# Patient Record
Sex: FEMALE | Race: WHITE | Employment: OTHER | URBAN - METROPOLITAN AREA
[De-identification: names, ages, dates, MRNs, and addresses within clinical notes are randomized per-mention and may not be internally consistent; named-entity substitution may affect disease eponyms.]

---

## 2018-02-13 ENCOUNTER — OFFICE VISIT (OUTPATIENT)
Dept: DERMATOLOGY | Age: 63
End: 2018-02-13

## 2018-02-13 DIAGNOSIS — L30.8 OTHER ECZEMA: ICD-10-CM

## 2018-02-13 DIAGNOSIS — L57.8 DIFFUSE PHOTODAMAGE OF SKIN: ICD-10-CM

## 2018-02-13 DIAGNOSIS — B07.8 OTHER VIRAL WARTS: ICD-10-CM

## 2018-02-13 DIAGNOSIS — L57.0 KERATOSIS, ACTINIC: Primary | ICD-10-CM

## 2018-02-13 DIAGNOSIS — D22.9 BENIGN NEVUS: ICD-10-CM

## 2018-02-13 DIAGNOSIS — B00.9 HSV INFECTION: ICD-10-CM

## 2018-02-13 PROCEDURE — 99213 OFFICE O/P EST LOW 20 MIN: CPT | Performed by: DERMATOLOGY

## 2018-02-13 RX ORDER — DICLOFENAC SODIUM 75 MG/1
75 TABLET, DELAYED RELEASE ORAL
Status: ON HOLD | COMMUNITY
Start: 2017-12-20 | End: 2019-10-11 | Stop reason: HOSPADM

## 2018-02-13 RX ORDER — METOPROLOL TARTRATE 50 MG/1
TABLET, FILM COATED ORAL
COMMUNITY
Start: 2017-08-18

## 2018-02-13 RX ORDER — CYCLOBENZAPRINE HCL 10 MG
TABLET ORAL
COMMUNITY
Start: 2018-01-24

## 2018-04-07 ENCOUNTER — APPOINTMENT (OUTPATIENT)
Dept: GENERAL RADIOLOGY | Facility: HOSPITAL | Age: 63
End: 2018-04-07

## 2018-04-07 ENCOUNTER — APPOINTMENT (OUTPATIENT)
Dept: CT IMAGING | Facility: HOSPITAL | Age: 63
End: 2018-04-07

## 2018-04-07 ENCOUNTER — HOSPITAL ENCOUNTER (INPATIENT)
Facility: HOSPITAL | Age: 63
LOS: 10 days | Discharge: HOME | End: 2018-04-17
Attending: EMERGENCY MEDICINE | Admitting: INTERNAL MEDICINE

## 2018-04-07 DIAGNOSIS — E86.0 DEHYDRATION: ICD-10-CM

## 2018-04-07 DIAGNOSIS — R41.82 ALTERED MENTAL STATUS, UNSPECIFIED ALTERED MENTAL STATUS TYPE: ICD-10-CM

## 2018-04-07 DIAGNOSIS — E11.65 POORLY CONTROLLED TYPE 2 DIABETES MELLITUS (HCC): ICD-10-CM

## 2018-04-07 DIAGNOSIS — A41.9 SEPSIS, DUE TO UNSPECIFIED ORGANISM: ICD-10-CM

## 2018-04-07 DIAGNOSIS — R53.1 GENERALIZED WEAKNESS: Primary | ICD-10-CM

## 2018-04-07 DIAGNOSIS — R73.9 HYPERGLYCEMIA: ICD-10-CM

## 2018-04-07 LAB
ALBUMIN SERPL-MCNC: 4.3 G/DL (ref 3.5–5.2)
ALBUMIN/GLOB SERPL: 1.2 G/DL
ALP SERPL-CCNC: 127 U/L (ref 39–117)
ALT SERPL W P-5'-P-CCNC: 63 U/L (ref 1–33)
AMPHET+METHAMPHET UR QL: NEGATIVE
ANION GAP SERPL CALCULATED.3IONS-SCNC: 14.8 MMOL/L
AST SERPL-CCNC: 48 U/L (ref 1–32)
BARBITURATES UR QL SCN: NEGATIVE
BASOPHILS # BLD AUTO: 0.01 10*3/MM3 (ref 0–0.2)
BASOPHILS NFR BLD AUTO: 0.1 % (ref 0–1.5)
BENZODIAZ UR QL SCN: NEGATIVE
BILIRUB SERPL-MCNC: 0.8 MG/DL (ref 0.1–1.2)
BILIRUB UR QL STRIP: NEGATIVE
BUN BLD-MCNC: 30 MG/DL (ref 8–23)
BUN/CREAT SERPL: 23.1 (ref 7–25)
CALCIUM SPEC-SCNC: 9.7 MG/DL (ref 8.6–10.5)
CANNABINOIDS SERPL QL: NEGATIVE
CHLORIDE SERPL-SCNC: 91 MMOL/L (ref 98–107)
CK SERPL-CCNC: 62 U/L (ref 20–180)
CLARITY UR: CLEAR
CO2 SERPL-SCNC: 24.2 MMOL/L (ref 22–29)
COCAINE UR QL: NEGATIVE
COLOR UR: YELLOW
CREAT BLD-MCNC: 1.3 MG/DL (ref 0.57–1)
CRP SERPL-MCNC: 5.43 MG/DL (ref 0–0.5)
D-LACTATE SERPL-SCNC: 3.6 MMOL/L (ref 0.5–2)
DEPRECATED RDW RBC AUTO: 46.6 FL (ref 37–54)
EOSINOPHIL # BLD AUTO: 0.01 10*3/MM3 (ref 0–0.7)
EOSINOPHIL NFR BLD AUTO: 0.1 % (ref 0.3–6.2)
ERYTHROCYTE [DISTWIDTH] IN BLOOD BY AUTOMATED COUNT: 13.4 % (ref 11.7–13)
ETHANOL BLD-MCNC: <10 MG/DL (ref 0–10)
ETHANOL UR QL: <0.01 %
GFR SERPL CREATININE-BSD FRML MDRD: 41 ML/MIN/1.73
GFR SERPL CREATININE-BSD FRML MDRD: 50 ML/MIN/1.73
GLOBULIN UR ELPH-MCNC: 3.7 GM/DL
GLUCOSE BLD-MCNC: 437 MG/DL (ref 65–99)
GLUCOSE UR STRIP-MCNC: ABNORMAL MG/DL
HCT VFR BLD AUTO: 42.6 % (ref 35.6–45.5)
HGB BLD-MCNC: 14.5 G/DL (ref 11.9–15.5)
HGB UR QL STRIP.AUTO: NEGATIVE
HOLD SPECIMEN: NORMAL
HOLD SPECIMEN: NORMAL
IMM GRANULOCYTES # BLD: 0.02 10*3/MM3 (ref 0–0.03)
IMM GRANULOCYTES NFR BLD: 0.2 % (ref 0–0.5)
KETONES UR QL STRIP: ABNORMAL
LEUKOCYTE ESTERASE UR QL STRIP.AUTO: NEGATIVE
LYMPHOCYTES # BLD AUTO: 0.58 10*3/MM3 (ref 0.9–4.8)
LYMPHOCYTES NFR BLD AUTO: 6.9 % (ref 19.6–45.3)
MAGNESIUM SERPL-MCNC: 1.8 MG/DL (ref 1.6–2.4)
MCH RBC QN AUTO: 32.7 PG (ref 26.9–32)
MCHC RBC AUTO-ENTMCNC: 34 G/DL (ref 32.4–36.3)
MCV RBC AUTO: 96.2 FL (ref 80.5–98.2)
METHADONE UR QL SCN: NEGATIVE
MONOCYTES # BLD AUTO: 0.29 10*3/MM3 (ref 0.2–1.2)
MONOCYTES NFR BLD AUTO: 3.5 % (ref 5–12)
NEUTROPHILS # BLD AUTO: 7.47 10*3/MM3 (ref 1.9–8.1)
NEUTROPHILS NFR BLD AUTO: 89.2 % (ref 42.7–76)
NITRITE UR QL STRIP: NEGATIVE
OPIATES UR QL: NEGATIVE
OXYCODONE UR QL SCN: NEGATIVE
PH UR STRIP.AUTO: 6 [PH] (ref 5–8)
PLATELET # BLD AUTO: 104 10*3/MM3 (ref 140–500)
PMV BLD AUTO: 11.6 FL (ref 6–12)
POTASSIUM BLD-SCNC: 5.8 MMOL/L (ref 3.5–5.2)
PROCALCITONIN SERPL-MCNC: 1.08 NG/ML (ref 0.1–0.25)
PROT SERPL-MCNC: 8 G/DL (ref 6–8.5)
PROT UR QL STRIP: NEGATIVE
RBC # BLD AUTO: 4.43 10*6/MM3 (ref 3.9–5.2)
SODIUM BLD-SCNC: 130 MMOL/L (ref 136–145)
SP GR UR STRIP: 1.03 (ref 1–1.03)
TROPONIN T SERPL-MCNC: <0.01 NG/ML (ref 0–0.03)
UROBILINOGEN UR QL STRIP: ABNORMAL
WBC NRBC COR # BLD: 8.38 10*3/MM3 (ref 4.5–10.7)
WHOLE BLOOD HOLD SPECIMEN: NORMAL
WHOLE BLOOD HOLD SPECIMEN: NORMAL

## 2018-04-07 PROCEDURE — 25010000002 PIPERACILLIN SOD-TAZOBACTAM PER 1 G: Performed by: INTERNAL MEDICINE

## 2018-04-07 PROCEDURE — 71045 X-RAY EXAM CHEST 1 VIEW: CPT

## 2018-04-07 PROCEDURE — 80307 DRUG TEST PRSMV CHEM ANLYZR: CPT | Performed by: EMERGENCY MEDICINE

## 2018-04-07 PROCEDURE — 93010 ELECTROCARDIOGRAM REPORT: CPT | Performed by: INTERNAL MEDICINE

## 2018-04-07 PROCEDURE — 83605 ASSAY OF LACTIC ACID: CPT | Performed by: EMERGENCY MEDICINE

## 2018-04-07 PROCEDURE — 25010000002 VANCOMYCIN 10 G RECONSTITUTED SOLUTION: Performed by: EMERGENCY MEDICINE

## 2018-04-07 PROCEDURE — 87150 DNA/RNA AMPLIFIED PROBE: CPT | Performed by: EMERGENCY MEDICINE

## 2018-04-07 PROCEDURE — 87186 SC STD MICRODIL/AGAR DIL: CPT | Performed by: EMERGENCY MEDICINE

## 2018-04-07 PROCEDURE — 84145 PROCALCITONIN (PCT): CPT | Performed by: EMERGENCY MEDICINE

## 2018-04-07 PROCEDURE — 99285 EMERGENCY DEPT VISIT HI MDM: CPT

## 2018-04-07 PROCEDURE — 87040 BLOOD CULTURE FOR BACTERIA: CPT | Performed by: EMERGENCY MEDICINE

## 2018-04-07 PROCEDURE — 87486 CHLMYD PNEUM DNA AMP PROBE: CPT | Performed by: INTERNAL MEDICINE

## 2018-04-07 PROCEDURE — 93005 ELECTROCARDIOGRAM TRACING: CPT | Performed by: EMERGENCY MEDICINE

## 2018-04-07 PROCEDURE — 86140 C-REACTIVE PROTEIN: CPT | Performed by: INTERNAL MEDICINE

## 2018-04-07 PROCEDURE — 74176 CT ABD & PELVIS W/O CONTRAST: CPT

## 2018-04-07 PROCEDURE — 87633 RESP VIRUS 12-25 TARGETS: CPT | Performed by: INTERNAL MEDICINE

## 2018-04-07 PROCEDURE — 93005 ELECTROCARDIOGRAM TRACING: CPT

## 2018-04-07 PROCEDURE — 85025 COMPLETE CBC W/AUTO DIFF WBC: CPT | Performed by: EMERGENCY MEDICINE

## 2018-04-07 PROCEDURE — 82550 ASSAY OF CK (CPK): CPT | Performed by: EMERGENCY MEDICINE

## 2018-04-07 PROCEDURE — 0HQ0XZZ REPAIR SCALP SKIN, EXTERNAL APPROACH: ICD-10-PCS | Performed by: EMERGENCY MEDICINE

## 2018-04-07 PROCEDURE — 70450 CT HEAD/BRAIN W/O DYE: CPT

## 2018-04-07 PROCEDURE — 83735 ASSAY OF MAGNESIUM: CPT | Performed by: EMERGENCY MEDICINE

## 2018-04-07 PROCEDURE — 87798 DETECT AGENT NOS DNA AMP: CPT | Performed by: INTERNAL MEDICINE

## 2018-04-07 PROCEDURE — 81003 URINALYSIS AUTO W/O SCOPE: CPT | Performed by: EMERGENCY MEDICINE

## 2018-04-07 PROCEDURE — 84484 ASSAY OF TROPONIN QUANT: CPT | Performed by: EMERGENCY MEDICINE

## 2018-04-07 PROCEDURE — 80053 COMPREHEN METABOLIC PANEL: CPT | Performed by: EMERGENCY MEDICINE

## 2018-04-07 PROCEDURE — 87147 CULTURE TYPE IMMUNOLOGIC: CPT | Performed by: EMERGENCY MEDICINE

## 2018-04-07 PROCEDURE — 36415 COLL VENOUS BLD VENIPUNCTURE: CPT

## 2018-04-07 PROCEDURE — 72125 CT NECK SPINE W/O DYE: CPT

## 2018-04-07 PROCEDURE — 87581 M.PNEUMON DNA AMP PROBE: CPT | Performed by: INTERNAL MEDICINE

## 2018-04-07 RX ORDER — GABAPENTIN 600 MG/1
600 TABLET ORAL 3 TIMES DAILY
COMMUNITY

## 2018-04-07 RX ORDER — TRAZODONE HYDROCHLORIDE 50 MG/1
50 TABLET ORAL NIGHTLY
Status: ON HOLD | COMMUNITY
End: 2018-04-17

## 2018-04-07 RX ORDER — NICOTINE POLACRILEX 4 MG
15 LOZENGE BUCCAL
Status: DISCONTINUED | OUTPATIENT
Start: 2018-04-07 | End: 2018-04-17 | Stop reason: HOSPADM

## 2018-04-07 RX ORDER — DEXTROSE MONOHYDRATE 25 G/50ML
25 INJECTION, SOLUTION INTRAVENOUS
Status: DISCONTINUED | OUTPATIENT
Start: 2018-04-07 | End: 2018-04-17 | Stop reason: HOSPADM

## 2018-04-07 RX ORDER — SODIUM CHLORIDE 0.9 % (FLUSH) 0.9 %
10 SYRINGE (ML) INJECTION AS NEEDED
Status: DISCONTINUED | OUTPATIENT
Start: 2018-04-07 | End: 2018-04-17 | Stop reason: HOSPADM

## 2018-04-07 RX ORDER — CYCLOBENZAPRINE HCL 10 MG
10 TABLET ORAL EVERY 8 HOURS PRN
COMMUNITY

## 2018-04-07 RX ADMIN — VANCOMYCIN HYDROCHLORIDE 2000 MG: 10 INJECTION, POWDER, LYOPHILIZED, FOR SOLUTION INTRAVENOUS at 23:01

## 2018-04-07 RX ADMIN — TAZOBACTAM SODIUM AND PIPERACILLIN SODIUM 4.5 G: 500; 4 INJECTION, SOLUTION INTRAVENOUS at 23:31

## 2018-04-07 RX ADMIN — SODIUM CHLORIDE 1000 ML: 9 INJECTION, SOLUTION INTRAVENOUS at 21:58

## 2018-04-07 RX ADMIN — SODIUM CHLORIDE 1000 ML: 9 INJECTION, SOLUTION INTRAVENOUS at 19:44

## 2018-04-07 NOTE — ED TRIAGE NOTES
"Patient presents to ER via EMS.  Family reports that patient had intermittent left arm pain after lunch.  Patient became pale and \"felt bad\".  Patient attempted to get out of car and fell forward but broke fall with hands.  While daughter was assisting patient into house patient had syncope and struck head.  Patient tried to get up and was unable and then daughter called ems.  "

## 2018-04-07 NOTE — ED PROVIDER NOTES
" EMERGENCY DEPARTMENT ENCOUNTER    Room Number:  12/12  Date seen:  4/7/2018  Time seen: 7:10 PM  PCP: No Known Provider  Historian: Patient, Family  History Limited By: Patient is a vague historian          HPI:  Chief Complaint: Multiple Falls   Context: Elisabet Berry is a 62 y.o. female with h/o diabetes. Pt was last known well at her baseline neurological and mental status at about 12:00 PM today. Pt reports that at about 14:30 today, while pt was walking around looking at apartments for her daughter, pt developed \"shooting\" left arm pain. Pt was also concurrently pale due to which pt's daughter drove pt back home. While pt was walking on the driveway en route to her home, pt fell about 3 times, but did not sustain any significant injuries during each of these falls. While pt was inside her home, pt had a brief syncopal episode and sustained blow to the head against the coffee table (pt is a vague historian and does not clarify if pt had any prodrome before the syncopal episode). When pt attempted to get up off of the floor s/p the syncopal episode, pt fell again. Consequently, pt's daughter called the paramedics. Daughter reports that pt has also had generalized weakness and has been answering questions more slowly than pt's baseline (both of these symptoms began before pt sustained head injury). There are no other complaints at this time.        Location: Generalized body  Radiation: N/A  Quality: N/A  Intensity/Severity: Moderate  Duration: Onset at about 14:30 today  Onset quality: Abrupt  Timing: Intermittent   Progression: Unknown  Aggravating Factors: Ambulation  Alleviating Factors: Nothing  Previous Episodes: None  Treatment before arrival: None  Associated Symptoms: Left arm pain, syncope, head injury, generalized weakness, slow speech        MEDICAL RECORD REVIEW    Pt has had no recent visits to [facility] ED.             PAST MEDICAL HISTORY  Active Ambulatory Problems     Diagnosis Date Noted   • No " Active Ambulatory Problems     Resolved Ambulatory Problems     Diagnosis Date Noted   • No Resolved Ambulatory Problems     No Additional Past Medical History         PAST SURGICAL HISTORY  No past surgical history on file.      FAMILY HISTORY  No family history on file.      SOCIAL HISTORY  Social History     Social History   • Marital status:      Spouse name: N/A   • Number of children: N/A   • Years of education: N/A     Occupational History   • Not on file.     Social History Main Topics   • Smoking status: Not on file   • Smokeless tobacco: Not on file   • Alcohol use Not on file   • Drug use: Unknown   • Sexual activity: Not on file     Other Topics Concern   • Not on file     Social History Narrative   • No narrative on file         ALLERGIES  Review of patient's allergies indicates no known allergies.        REVIEW OF SYSTEMS  Review of Systems   Unable to perform ROS: Other (pt is a vague historian)   Musculoskeletal:        Left arm pain, head injury   Skin: Positive for pallor.   Neurological: Positive for syncope, speech difficulty (slow speech) and weakness (generalized; not focal).            PHYSICAL EXAM  ED Triage Vitals   Temp Heart Rate Resp BP SpO2   04/07/18 1853 04/07/18 1845 04/07/18 1845 04/07/18 1845 04/07/18 1845   97 °F (36.1 °C) (!) 123 22 142/64 96 %      Temp src Heart Rate Source Patient Position BP Location FiO2 (%)   -- 04/07/18 1845 04/07/18 1845 -- --    Monitor Sitting         Physical Exam   Constitutional: She is oriented to person, place, and time. She appears distressed (mild).   HENT:   Mouth/Throat: Mucous membranes are normal.   Eyes: EOM are normal. Pupils are equal, round, and reactive to light.   Neck: Neck supple.   Cardiovascular: Regular rhythm and normal heart sounds.  Tachycardia present.    Pulmonary/Chest: Effort normal and breath sounds normal. No respiratory distress. She has no decreased breath sounds. She has no wheezes. She has no rhonchi. She has no  rales.   Abdominal: Soft. There is no tenderness. There is no rebound and no guarding.   Musculoskeletal:   No C-Spine, T-Spine, or L-Spine tenderness.   Neurological: She is alert and oriented to person, place, and time. She has normal sensation. She displays weakness (pt exhibits generalized weakness).   Pt answers questions slowly, but appropriately. Pt is able to lift her BLEs off of the bed, but will drop them immediately afterwards.    Skin: Skin is warm. Laceration (<1 cm laceration to the occiput) noted.   Psychiatric: Mood and affect normal.   Nursing note and vitals reviewed.          LAB RESULTS  Recent Results (from the past 24 hour(s))   Comprehensive Metabolic Panel    Collection Time: 04/07/18  6:58 PM   Result Value Ref Range    Glucose 437 (C) 65 - 99 mg/dL    BUN 30 (H) 8 - 23 mg/dL    Creatinine 1.30 (H) 0.57 - 1.00 mg/dL    Sodium 130 (L) 136 - 145 mmol/L    Potassium 5.8 (H) 3.5 - 5.2 mmol/L    Chloride 91 (L) 98 - 107 mmol/L    CO2 24.2 22.0 - 29.0 mmol/L    Calcium 9.7 8.6 - 10.5 mg/dL    Total Protein 8.0 6.0 - 8.5 g/dL    Albumin 4.30 3.50 - 5.20 g/dL    ALT (SGPT) 63 (H) 1 - 33 U/L    AST (SGOT) 48 (H) 1 - 32 U/L    Alkaline Phosphatase 127 (H) 39 - 117 U/L    Total Bilirubin 0.8 0.1 - 1.2 mg/dL    eGFR Non African Amer 41 (L) >60 mL/min/1.73    eGFR  African Amer 50 (L) >60 mL/min/1.73    Globulin 3.7 gm/dL    A/G Ratio 1.2 g/dL    BUN/Creatinine Ratio 23.1 7.0 - 25.0    Anion Gap 14.8 mmol/L   Magnesium    Collection Time: 04/07/18  6:58 PM   Result Value Ref Range    Magnesium 1.8 1.6 - 2.4 mg/dL   Troponin    Collection Time: 04/07/18  6:58 PM   Result Value Ref Range    Troponin T <0.010 0.000 - 0.030 ng/mL   Light Blue Top    Collection Time: 04/07/18  6:58 PM   Result Value Ref Range    Extra Tube hold for add-on    Green Top (Gel)    Collection Time: 04/07/18  6:58 PM   Result Value Ref Range    Extra Tube Hold for add-ons.    Lavender Top    Collection Time: 04/07/18  6:58 PM    Result Value Ref Range    Extra Tube hold for add-on    Gold Top - SST    Collection Time: 04/07/18  6:58 PM   Result Value Ref Range    Extra Tube Hold for add-ons.    CBC Auto Differential    Collection Time: 04/07/18  6:58 PM   Result Value Ref Range    WBC 8.38 4.50 - 10.70 10*3/mm3    RBC 4.43 3.90 - 5.20 10*6/mm3    Hemoglobin 14.5 11.9 - 15.5 g/dL    Hematocrit 42.6 35.6 - 45.5 %    MCV 96.2 80.5 - 98.2 fL    MCH 32.7 (H) 26.9 - 32.0 pg    MCHC 34.0 32.4 - 36.3 g/dL    RDW 13.4 (H) 11.7 - 13.0 %    RDW-SD 46.6 37.0 - 54.0 fl    MPV 11.6 6.0 - 12.0 fL    Platelets 104 (L) 140 - 500 10*3/mm3    Neutrophil % 89.2 (H) 42.7 - 76.0 %    Lymphocyte % 6.9 (L) 19.6 - 45.3 %    Monocyte % 3.5 (L) 5.0 - 12.0 %    Eosinophil % 0.1 (L) 0.3 - 6.2 %    Basophil % 0.1 0.0 - 1.5 %    Immature Grans % 0.2 0.0 - 0.5 %    Neutrophils, Absolute 7.47 1.90 - 8.10 10*3/mm3    Lymphocytes, Absolute 0.58 (L) 0.90 - 4.80 10*3/mm3    Monocytes, Absolute 0.29 0.20 - 1.20 10*3/mm3    Eosinophils, Absolute 0.01 0.00 - 0.70 10*3/mm3    Basophils, Absolute 0.01 0.00 - 0.20 10*3/mm3    Immature Grans, Absolute 0.02 0.00 - 0.03 10*3/mm3   CK    Collection Time: 04/07/18  6:58 PM   Result Value Ref Range    Creatine Kinase 62 20 - 180 U/L   Ethanol    Collection Time: 04/07/18  6:58 PM   Result Value Ref Range    Ethanol <10 0 - 10 mg/dL    Ethanol % <0.010 %   Procalcitonin    Collection Time: 04/07/18  6:58 PM   Result Value Ref Range    Procalcitonin 1.08 (C) 0.10 - 0.25 ng/mL   C-reactive Protein    Collection Time: 04/07/18  6:58 PM   Result Value Ref Range    C-Reactive Protein 5.43 (H) 0.00 - 0.50 mg/dL   Urinalysis With / Microscopic If Indicated - Urine, Clean Catch    Collection Time: 04/07/18  7:42 PM   Result Value Ref Range    Color, UA Yellow Yellow, Straw    Appearance, UA Clear Clear    pH, UA 6.0 5.0 - 8.0    Specific Gravity, UA 1.028 1.005 - 1.030    Glucose, UA >=1000 mg/dL (3+) (A) Negative    Ketones, UA 15 mg/dL (1+)  (A) Negative    Bilirubin, UA Negative Negative    Blood, UA Negative Negative    Protein, UA Negative Negative    Leuk Esterase, UA Negative Negative    Nitrite, UA Negative Negative    Urobilinogen, UA 0.2 E.U./dL 0.2 - 1.0 E.U./dL   Urine Drug Screen - Urine, Clean Catch    Collection Time: 04/07/18  7:42 PM   Result Value Ref Range    Amphet/Methamphet, Screen Negative Negative    Barbiturates Screen, Urine Negative Negative    Benzodiazepine Screen, Urine Negative Negative    Cocaine Screen, Urine Negative Negative    Opiate Screen Negative Negative    THC, Screen, Urine Negative Negative    Methadone Screen, Urine Negative Negative    Oxycodone Screen, Urine Negative Negative       Ordered the above labs and reviewed the results.        RADIOLOGY  CT Abdomen Pelvis Without Contrast   Final Result   1.  Colonic diverticulosis, constipation.   2. Nondisplaced right transverse process fractures of L1 and L2.   3. Grossly unremarkable noncontrast exam otherwise considering artifact.       This report was finalized on 4/7/2018 8:25 PM by Gian Vance MD.          CT Cervical Spine Without Contrast   Final Result   1. Postsurgical and degenerative changes as discussed, no acute osseous   finding       This report was finalized on 4/7/2018 8:20 PM by Gian Vance MD.          CT Head Without Contrast   Final Result   1. No acute intracranial abnormality.                           This study was performed with techniques to keep radiation doses as low   as reasonably achievable (ALARA). Individualized dose reduction   techniques using automated exposure control or adjustment of mA and/or   kV according to the patient size were employed.        This report was finalized on 4/7/2018 8:17 PM by Gian Vance MD.          XR Chest 1 View   Final Result       Expiratory radiograph without gross active air space disease process       This report was finalized on 4/7/2018 8:04 PM by Gian Vance MD.                  Ordered the above noted radiological studies. Reviewed by me in PACS.             PROCEDURES  Critical Care  Performed by: ANIKA DAVILA  Authorized by: ANIKA DAVILA     Critical care provider statement:     Critical care time (minutes):  40    Critical care time was exclusive of:  Separately billable procedures and treating other patients    Critical care was necessary to treat or prevent imminent or life-threatening deterioration of the following conditions:  Sepsis    Critical care was time spent personally by me on the following activities:  Development of treatment plan with patient or surrogate, discussions with consultants, evaluation of patient's response to treatment, examination of patient, obtaining history from patient or surrogate, ordering and review of laboratory studies, ordering and review of radiographic studies, pulse oximetry and re-evaluation of patient's condition              EKG:           EKG time: 19:02  Rhythm/Rate: Sinus tachycardia rate 125  P waves and [place]: Normal P waves  QRS, axis: Normal QRS   ST and T waves: Diffuse T wave flattening     Interpreted Contemporaneously by me, independently viewed  No old EKG is available for comparison           PROGRESS AND CONSULTS  ED Course   Comment By Time   9:19 PM  Patient here for generalized weakness.  This is a very bizarre case.  She has generalized weakness.  She has the strength to lift all extremities and then she drops them to the bed.  She answers questions slowly but appropriately.  Doubt stroke as it is bilateral.  Outside of tpa window and Ct negative.  Will need MRI.  Is dehydrated with elevated BUN and heart rate.  Has elevated sugars.  As well.  Has negative Ct of c spine and CT of abdomen.  Not short of breath.  Discussed with Dr. Tyson and Dr. FRANKLIN Mcknight and they are comfortable with floor admission.  Can transfer if deteriorates. Anika Davila MD 04/07 2119   10:04 PM  Patients heart rate still up.  Blood  pressure dropping.  PCT elevated.  Cultures and antibiotics.  Will send to unit for mental status. Antolin Montero MD 04/07 2204     7:33 PM:  Pt is not a candidate for tPA as pt's symptoms do not appear c/w stroke as pt's BUEs and BLEs are weak. UA, CK, UDS, BAL, blood work, CT Head, CT C-Spine, and CXR ordered for further evaluation. IV fluids ordered to hydrate pt.     7:41 PM:  CT Abd ordered for further evaluation.     8:31 PM:  Rechecked pt. Daughter states that pt currently appears slightly more confused. Informed pt and family that pt's UA is negative for acute UTI. Pt's CT Head is negative acute. CT C-Spine shows degenerative and postsurgical changes, but nothing acute otherwise. CT Abd shows that pt is constipated, but there is no acute intraabdominal abnormality. However, pt's platelet count is 104. K+ is 5.8. Creatinine is 1.30. Blood glucose is 437. Pt's CMP suggests that pt is dehydrated. Need for pt's admission to the hospital for further evaluation and treatment. Pt and family agree with plan. Decision time to admit: Now.     8:37 PM:  Placed call to the intensivist to discuss further course of care.     8:56 PM:  Discussed the case with Dr. Christian Mcknight, intensivist. He would like me to consult the internist; if the internist is uncomfortable with admitting pt on the floor, Dr. Mcknight will admit pt to an ICU bed.    9:01 PM:  Blood cultures ordered for further evaluation.     9:16 PM:  Discussed the case with Dr. Tyson, hospitalist. He will admit pt to a telemetry bed.     9:51 PM:  Rechecked pt. Pt's BP is 106/80. HR is 131. Pt's procalcitonin is 1.08. Will discuss this with the intensivist.    Pt's occipital laceration will be repaired by Ms. Erin Chairez PA-C.     9:55 PM:  Discussed case with Dr. Christian Mcknight, intensivist. He will admit pt to an ICU bed.     9:57 PM:  Lactic acid ordered for further evaluation. Zosyn and Vancomycin ordered to cover for infection.     10:22 PM:  Dr. Mcknight is  at pt's bedside evaluating pt. Dr. Tyson has been informed that pt has been admitted to the ICU.             MEDICAL DECISION MAKING      MDM  Number of Diagnoses or Management Options  Altered mental status, unspecified altered mental status type:   Dehydration:   Generalized weakness:   Hyperglycemia:      Amount and/or Complexity of Data Reviewed  Clinical lab tests: ordered and reviewed (Creatinine is 1.30. K+ is 5.8. BG is 437. Troponin is negative. Platelet count is 104. Procalcitonin is 1.08.)  Tests in the radiology section of CPT®: ordered and reviewed (CT Head:  No acute intracranial abnormality.    )  Tests in the medicine section of CPT®: reviewed and ordered (EKG interpreted.   )  Obtain history from someone other than the patient: yes (Daughter provides significant history. )  Discuss the patient with other providers: yes (Discussed the case with Dr. Christian Mcknight, intensivist. Case d/w Dr. Tyson, hospitalist.)    Patient Progress  Patient progress: other (comment) (Pt is guarded.)             DIAGNOSIS  Final diagnoses:   Generalized weakness   Hyperglycemia   Dehydration   Altered mental status, unspecified altered mental status type         DISPOSITION  Pt admitted to the ICU.      ADMISSION    Discussed treatment plan and reason for admission with pt/family and admitting physician.  Pt/family voiced understanding of the plan for admission for further testing/treatment as needed.                 Latest Documented Vital Signs:  As of 10:52 PM  BP- 108/64 HR- (!) 129 Temp- (!) 102.1 °F (38.9 °C) (Oral) O2 sat- 97%      --  Documentation assistance provided by kimberly Lang for Dr. Winston MD.  Information recorded by the kimberly was done at my direction and has been verified and validated by me.                   Rahul Lang  04/07/18 1371       Antolin Montero MD  04/07/18 [id number]

## 2018-04-08 ENCOUNTER — APPOINTMENT (OUTPATIENT)
Dept: GENERAL RADIOLOGY | Facility: HOSPITAL | Age: 63
End: 2018-04-08

## 2018-04-08 ENCOUNTER — APPOINTMENT (OUTPATIENT)
Dept: MRI IMAGING | Facility: HOSPITAL | Age: 63
End: 2018-04-08

## 2018-04-08 ENCOUNTER — APPOINTMENT (OUTPATIENT)
Dept: CARDIOLOGY | Facility: HOSPITAL | Age: 63
End: 2018-04-08
Attending: PSYCHIATRY & NEUROLOGY

## 2018-04-08 LAB
ALBUMIN SERPL-MCNC: 2.8 G/DL (ref 3.5–5.2)
ALBUMIN/GLOB SERPL: 1 G/DL
ALP SERPL-CCNC: 74 U/L (ref 39–117)
ALT SERPL W P-5'-P-CCNC: 39 U/L (ref 1–33)
ANION GAP SERPL CALCULATED.3IONS-SCNC: 16.9 MMOL/L
ARTERIAL PATENCY WRIST A: POSITIVE
ARTERIAL PATENCY WRIST A: POSITIVE
AST SERPL-CCNC: 30 U/L (ref 1–32)
ATMOSPHERIC PRESS: 749.7 MMHG
ATMOSPHERIC PRESS: 751.9 MMHG
B PERT DNA SPEC QL NAA+PROBE: NOT DETECTED
BACTERIA BLD CULT: ABNORMAL
BASE EXCESS BLDA CALC-SCNC: -3.8 MMOL/L (ref 0–2)
BASE EXCESS BLDA CALC-SCNC: -5.2 MMOL/L (ref 0–2)
BASOPHILS # BLD AUTO: 0.01 10*3/MM3 (ref 0–0.2)
BASOPHILS NFR BLD AUTO: 0.1 % (ref 0–1.5)
BDY SITE: ABNORMAL
BDY SITE: ABNORMAL
BILIRUB SERPL-MCNC: 0.8 MG/DL (ref 0.1–1.2)
BUN BLD-MCNC: 22 MG/DL (ref 8–23)
BUN/CREAT SERPL: 23.9 (ref 7–25)
C PNEUM DNA NPH QL NAA+NON-PROBE: NOT DETECTED
CALCIUM SPEC-SCNC: 6.9 MG/DL (ref 8.6–10.5)
CHLORIDE SERPL-SCNC: 105 MMOL/L (ref 98–107)
CO2 SERPL-SCNC: 17.1 MMOL/L (ref 22–29)
CREAT BLD-MCNC: 0.92 MG/DL (ref 0.57–1)
D-LACTATE SERPL-SCNC: 1.5 MMOL/L (ref 0.5–2)
D-LACTATE SERPL-SCNC: 2.4 MMOL/L (ref 0.5–2)
DEPRECATED RDW RBC AUTO: 48.7 FL (ref 37–54)
EOSINOPHIL # BLD AUTO: 0 10*3/MM3 (ref 0–0.7)
EOSINOPHIL NFR BLD AUTO: 0 % (ref 0.3–6.2)
ERYTHROCYTE [DISTWIDTH] IN BLOOD BY AUTOMATED COUNT: 13.4 % (ref 11.7–13)
FLUAV H1 2009 PAND RNA NPH QL NAA+PROBE: NOT DETECTED
FLUAV H1 HA GENE NPH QL NAA+PROBE: NOT DETECTED
FLUAV H3 RNA NPH QL NAA+PROBE: NOT DETECTED
FLUAV SUBTYP SPEC NAA+PROBE: NOT DETECTED
FLUBV RNA ISLT QL NAA+PROBE: NOT DETECTED
GFR SERPL CREATININE-BSD FRML MDRD: 62 ML/MIN/1.73
GLOBULIN UR ELPH-MCNC: 2.7 GM/DL
GLUCOSE BLD-MCNC: 316 MG/DL (ref 65–99)
GLUCOSE BLDC GLUCOMTR-MCNC: 209 MG/DL (ref 70–130)
GLUCOSE BLDC GLUCOMTR-MCNC: 235 MG/DL (ref 70–130)
GLUCOSE BLDC GLUCOMTR-MCNC: 255 MG/DL (ref 70–130)
GLUCOSE BLDC GLUCOMTR-MCNC: 256 MG/DL (ref 70–130)
GLUCOSE BLDC GLUCOMTR-MCNC: 298 MG/DL (ref 70–130)
GLUCOSE BLDC GLUCOMTR-MCNC: 394 MG/DL (ref 70–130)
HADV DNA SPEC NAA+PROBE: NOT DETECTED
HBA1C MFR BLD: 10.8 % (ref 4.8–5.6)
HCO3 BLDA-SCNC: 16.6 MMOL/L (ref 22–28)
HCO3 BLDA-SCNC: 18.6 MMOL/L (ref 22–28)
HCOV 229E RNA SPEC QL NAA+PROBE: NOT DETECTED
HCOV HKU1 RNA SPEC QL NAA+PROBE: NOT DETECTED
HCOV NL63 RNA SPEC QL NAA+PROBE: NOT DETECTED
HCOV OC43 RNA SPEC QL NAA+PROBE: NOT DETECTED
HCT VFR BLD AUTO: 34.9 % (ref 35.6–45.5)
HGB BLD-MCNC: 11.2 G/DL (ref 11.9–15.5)
HMPV RNA NPH QL NAA+NON-PROBE: NOT DETECTED
HOLD SPECIMEN: NORMAL
HOROWITZ INDEX BLD+IHG-RTO: 21 %
HOROWITZ INDEX BLD+IHG-RTO: 21 %
HPIV1 RNA SPEC QL NAA+PROBE: NOT DETECTED
HPIV2 RNA SPEC QL NAA+PROBE: NOT DETECTED
HPIV3 RNA NPH QL NAA+PROBE: NOT DETECTED
HPIV4 P GENE NPH QL NAA+PROBE: NOT DETECTED
IMM GRANULOCYTES # BLD: 0.03 10*3/MM3 (ref 0–0.03)
IMM GRANULOCYTES NFR BLD: 0.3 % (ref 0–0.5)
LYMPHOCYTES # BLD AUTO: 0.67 10*3/MM3 (ref 0.9–4.8)
LYMPHOCYTES NFR BLD AUTO: 7.1 % (ref 19.6–45.3)
M PNEUMO IGG SER IA-ACNC: NOT DETECTED
MCH RBC QN AUTO: 31.9 PG (ref 26.9–32)
MCHC RBC AUTO-ENTMCNC: 32.1 G/DL (ref 32.4–36.3)
MCV RBC AUTO: 99.4 FL (ref 80.5–98.2)
MODALITY: ABNORMAL
MODALITY: ABNORMAL
MONOCYTES # BLD AUTO: 0.38 10*3/MM3 (ref 0.2–1.2)
MONOCYTES NFR BLD AUTO: 4 % (ref 5–12)
NEUTROPHILS # BLD AUTO: 8.37 10*3/MM3 (ref 1.9–8.1)
NEUTROPHILS NFR BLD AUTO: 88.5 % (ref 42.7–76)
O2 A-A PPRESDIFF RESPIRATORY: 0.6 MMHG
O2 A-A PPRESDIFF RESPIRATORY: 0.6 MMHG
PCO2 BLDA: 22.9 MM HG (ref 35–45)
PCO2 BLDA: 26.6 MM HG (ref 35–45)
PH BLDA: 7.45 PH UNITS (ref 7.35–7.45)
PH BLDA: 7.47 PH UNITS (ref 7.35–7.45)
PLATELET # BLD AUTO: 86 10*3/MM3 (ref 140–500)
PMV BLD AUTO: 11 FL (ref 6–12)
PO2 BLDA: 71.2 MM HG (ref 80–100)
PO2 BLDA: 75.1 MM HG (ref 80–100)
POTASSIUM BLD-SCNC: 3.6 MMOL/L (ref 3.5–5.2)
PROT SERPL-MCNC: 5.5 G/DL (ref 6–8.5)
RBC # BLD AUTO: 3.51 10*6/MM3 (ref 3.9–5.2)
RHINOVIRUS RNA SPEC NAA+PROBE: NOT DETECTED
RSV RNA NPH QL NAA+NON-PROBE: NOT DETECTED
SAO2 % BLDCOA: 95.5 % (ref 92–99)
SAO2 % BLDCOA: 95.9 % (ref 92–99)
SET MECH RESP RATE: 25
SODIUM BLD-SCNC: 139 MMOL/L (ref 136–145)
TOTAL RATE: 24 BREATHS/MINUTE
TROPONIN T SERPL-MCNC: <0.01 NG/ML (ref 0–0.03)
URATE SERPL-MCNC: 3.1 MG/DL (ref 2.4–5.7)
WBC NRBC COR # BLD: 9.46 10*3/MM3 (ref 4.5–10.7)

## 2018-04-08 PROCEDURE — 82803 BLOOD GASES ANY COMBINATION: CPT

## 2018-04-08 PROCEDURE — 93306 TTE W/DOPPLER COMPLETE: CPT | Performed by: INTERNAL MEDICINE

## 2018-04-08 PROCEDURE — 99254 IP/OBS CNSLTJ NEW/EST MOD 60: CPT | Performed by: INTERNAL MEDICINE

## 2018-04-08 PROCEDURE — 0 GADOBENATE DIMEGLUMINE 529 MG/ML SOLUTION: Performed by: INTERNAL MEDICINE

## 2018-04-08 PROCEDURE — 86618 LYME DISEASE ANTIBODY: CPT | Performed by: INTERNAL MEDICINE

## 2018-04-08 PROCEDURE — 86666 EHRLICHIA ANTIBODY: CPT | Performed by: INTERNAL MEDICINE

## 2018-04-08 PROCEDURE — 73720 MRI LWR EXTREMITY W/O&W/DYE: CPT

## 2018-04-08 PROCEDURE — A9577 INJ MULTIHANCE: HCPCS | Performed by: INTERNAL MEDICINE

## 2018-04-08 PROCEDURE — 63710000001 INSULIN REGULAR HUMAN PER 5 UNITS: Performed by: INTERNAL MEDICINE

## 2018-04-08 PROCEDURE — 36600 WITHDRAWAL OF ARTERIAL BLOOD: CPT

## 2018-04-08 PROCEDURE — 70551 MRI BRAIN STEM W/O DYE: CPT

## 2018-04-08 PROCEDURE — 25010000002 NALOXONE PER 1 MG

## 2018-04-08 PROCEDURE — 80053 COMPREHEN METABOLIC PANEL: CPT | Performed by: INTERNAL MEDICINE

## 2018-04-08 PROCEDURE — 25010000002 VANCOMYCIN: Performed by: INTERNAL MEDICINE

## 2018-04-08 PROCEDURE — 36415 COLL VENOUS BLD VENIPUNCTURE: CPT | Performed by: INTERNAL MEDICINE

## 2018-04-08 PROCEDURE — 25010000002 PIPERACILLIN SOD-TAZOBACTAM PER 1 G: Performed by: INTERNAL MEDICINE

## 2018-04-08 PROCEDURE — 73030 X-RAY EXAM OF SHOULDER: CPT

## 2018-04-08 PROCEDURE — 99254 IP/OBS CNSLTJ NEW/EST MOD 60: CPT | Performed by: PSYCHIATRY & NEUROLOGY

## 2018-04-08 PROCEDURE — 25010000002 VANCOMYCIN 10 G RECONSTITUTED SOLUTION: Performed by: INTERNAL MEDICINE

## 2018-04-08 PROCEDURE — 83605 ASSAY OF LACTIC ACID: CPT | Performed by: INTERNAL MEDICINE

## 2018-04-08 PROCEDURE — 84550 ASSAY OF BLOOD/URIC ACID: CPT | Performed by: INTERNAL MEDICINE

## 2018-04-08 PROCEDURE — 82962 GLUCOSE BLOOD TEST: CPT

## 2018-04-08 PROCEDURE — 86757 RICKETTSIA ANTIBODY: CPT | Performed by: INTERNAL MEDICINE

## 2018-04-08 PROCEDURE — 93306 TTE W/DOPPLER COMPLETE: CPT

## 2018-04-08 PROCEDURE — 84484 ASSAY OF TROPONIN QUANT: CPT | Performed by: INTERNAL MEDICINE

## 2018-04-08 PROCEDURE — 86753 PROTOZOA ANTIBODY NOS: CPT | Performed by: INTERNAL MEDICINE

## 2018-04-08 PROCEDURE — 83036 HEMOGLOBIN GLYCOSYLATED A1C: CPT | Performed by: INTERNAL MEDICINE

## 2018-04-08 PROCEDURE — 85025 COMPLETE CBC W/AUTO DIFF WBC: CPT | Performed by: INTERNAL MEDICINE

## 2018-04-08 PROCEDURE — 25010000002 MORPHINE PER 10 MG: Performed by: INTERNAL MEDICINE

## 2018-04-08 RX ORDER — SODIUM CHLORIDE, SODIUM LACTATE, POTASSIUM CHLORIDE, CALCIUM CHLORIDE 600; 310; 30; 20 MG/100ML; MG/100ML; MG/100ML; MG/100ML
100 INJECTION, SOLUTION INTRAVENOUS CONTINUOUS
Status: ACTIVE | OUTPATIENT
Start: 2018-04-08 | End: 2018-04-09

## 2018-04-08 RX ORDER — SODIUM CHLORIDE 0.9 % (FLUSH) 0.9 %
1-10 SYRINGE (ML) INJECTION AS NEEDED
Status: DISCONTINUED | OUTPATIENT
Start: 2018-04-08 | End: 2018-04-17 | Stop reason: HOSPADM

## 2018-04-08 RX ORDER — NALOXONE HCL 0.4 MG/ML
VIAL (ML) INJECTION
Status: COMPLETED
Start: 2018-04-08 | End: 2018-04-08

## 2018-04-08 RX ORDER — MORPHINE SULFATE 2 MG/ML
2 INJECTION, SOLUTION INTRAMUSCULAR; INTRAVENOUS
Status: DISCONTINUED | OUTPATIENT
Start: 2018-04-08 | End: 2018-04-17 | Stop reason: HOSPADM

## 2018-04-08 RX ORDER — ACETAMINOPHEN 650 MG/1
650 SUPPOSITORY RECTAL EVERY 4 HOURS PRN
Status: DISCONTINUED | OUTPATIENT
Start: 2018-04-08 | End: 2018-04-17 | Stop reason: HOSPADM

## 2018-04-08 RX ORDER — SODIUM CHLORIDE 9 MG/ML
200 INJECTION, SOLUTION INTRAVENOUS CONTINUOUS
Status: ACTIVE | OUTPATIENT
Start: 2018-04-08 | End: 2018-04-08

## 2018-04-08 RX ADMIN — HUMAN INSULIN 8 UNITS: 100 INJECTION, SOLUTION SUBCUTANEOUS at 06:31

## 2018-04-08 RX ADMIN — TAZOBACTAM SODIUM AND PIPERACILLIN SODIUM 3.38 G: 375; 3 INJECTION, SOLUTION INTRAVENOUS at 21:24

## 2018-04-08 RX ADMIN — MORPHINE SULFATE 2 MG: 2 INJECTION, SOLUTION INTRAMUSCULAR; INTRAVENOUS at 10:04

## 2018-04-08 RX ADMIN — ACETAMINOPHEN 650 MG: 650 SUPPOSITORY RECTAL at 06:26

## 2018-04-08 RX ADMIN — SODIUM CHLORIDE, POTASSIUM CHLORIDE, SODIUM LACTATE AND CALCIUM CHLORIDE 100 ML/HR: 600; 310; 30; 20 INJECTION, SOLUTION INTRAVENOUS at 09:35

## 2018-04-08 RX ADMIN — SODIUM CHLORIDE 1000 ML: 9 INJECTION, SOLUTION INTRAVENOUS at 01:38

## 2018-04-08 RX ADMIN — NALOXONE HYDROCHLORIDE 0.8 MG: 0.4 INJECTION, SOLUTION INTRAMUSCULAR; INTRAVENOUS; SUBCUTANEOUS at 16:04

## 2018-04-08 RX ADMIN — NALOXONE HYDROCHLORIDE 0.8 MG: 0.4 INJECTION, SOLUTION INTRAMUSCULAR; INTRAVENOUS; SUBCUTANEOUS at 16:22

## 2018-04-08 RX ADMIN — HUMAN INSULIN 12 UNITS: 100 INJECTION, SOLUTION SUBCUTANEOUS at 00:43

## 2018-04-08 RX ADMIN — VANCOMYCIN HYDROCHLORIDE 1250 MG: 1 INJECTION, POWDER, LYOPHILIZED, FOR SOLUTION INTRAVENOUS at 22:54

## 2018-04-08 RX ADMIN — SODIUM CHLORIDE 1000 ML: 9 INJECTION, SOLUTION INTRAVENOUS at 17:01

## 2018-04-08 RX ADMIN — HUMAN INSULIN 8 UNITS: 100 INJECTION, SOLUTION SUBCUTANEOUS at 17:07

## 2018-04-08 RX ADMIN — VANCOMYCIN HYDROCHLORIDE 1250 MG: 1 INJECTION, POWDER, LYOPHILIZED, FOR SOLUTION INTRAVENOUS at 12:11

## 2018-04-08 RX ADMIN — HUMAN INSULIN 5 UNITS: 100 INJECTION, SOLUTION SUBCUTANEOUS at 21:00

## 2018-04-08 RX ADMIN — TAZOBACTAM SODIUM AND PIPERACILLIN SODIUM 3.38 G: 375; 3 INJECTION, SOLUTION INTRAVENOUS at 14:42

## 2018-04-08 RX ADMIN — TAZOBACTAM SODIUM AND PIPERACILLIN SODIUM 3.38 G: 375; 3 INJECTION, SOLUTION INTRAVENOUS at 04:41

## 2018-04-08 RX ADMIN — GADOBENATE DIMEGLUMINE 20 ML: 529 INJECTION, SOLUTION INTRAVENOUS at 19:07

## 2018-04-08 RX ADMIN — SODIUM CHLORIDE 200 ML/HR: 9 INJECTION, SOLUTION INTRAVENOUS at 08:58

## 2018-04-08 RX ADMIN — SODIUM CHLORIDE 200 ML/HR: 9 INJECTION, SOLUTION INTRAVENOUS at 00:43

## 2018-04-08 RX ADMIN — ACETAMINOPHEN 650 MG: 650 SUPPOSITORY RECTAL at 20:19

## 2018-04-08 RX ADMIN — HUMAN INSULIN 8 UNITS: 100 INJECTION, SOLUTION SUBCUTANEOUS at 12:09

## 2018-04-08 NOTE — ED PROVIDER NOTES
Laceration Repair  Date/Time: 4/7/2018 11:05 PM  Performed by: MARILUZ NGUYEN  Authorized by: ANIKA DAVILA     Consent:     Consent obtained:  Verbal    Consent given by:  Patient  Anesthesia (see MAR for exact dosages):     Anesthesia method:  None  Laceration details:     Location:  Scalp    Scalp location: Posterior.    Length (cm):  1  Repair type:     Repair type:  Simple  Pre-procedure details:     Preparation:  Patient was prepped and draped in usual sterile fashion  Treatment:     Area cleansed with:  Nunu-Romeo    Amount of cleaning:  Standard    Irrigation solution:  Sterile saline    Irrigation method:  Syringe  Skin repair:     Repair method:  Staples    Number of staples:  1  Approximation:     Approximation:  Close  Post-procedure details:     Dressing:  Open (no dressing)    Patient tolerance of procedure:  Tolerated well, no immediate complications              Documentation assistance provided by scrchadwick Ross for rEin Nguyen PA-C.  Information recorded by the scribe was done at my direction and has been verified and validated by me.     Bridgette Ross  04/07/18 2233       WOJCIECH Carmona  04/08/18 0050

## 2018-04-08 NOTE — PLAN OF CARE
Problem: Patient Care Overview  Goal: Plan of Care Review  Outcome: Ongoing (interventions implemented as appropriate)   04/08/18 0744   Coping/Psychosocial   Plan of Care Reviewed With patient   Plan of Care Review   Progress no change   OTHER   Outcome Summary Pt admitted to CCU after falling x3 and generalized weakness. CT of head and CXR all clear. Blood cultures drawn, 3L bolus, and vanc and zosyn started. Pt remains lethargic and slow to respond to questions but answers are appropriate. Rectal temp was 103.6 and a tylenol suppository was given per MD order. Troponin was negative. Will CTM.        Problem: Skin Injury Risk (Adult)  Goal: Identify Related Risk Factors and Signs and Symptoms  Outcome: Ongoing (interventions implemented as appropriate)    Goal: Skin Health and Integrity  Outcome: Ongoing (interventions implemented as appropriate)      Problem: Fall Risk (Adult)  Goal: Identify Related Risk Factors and Signs and Symptoms  Outcome: Ongoing (interventions implemented as appropriate)    Goal: Absence of Fall  Outcome: Ongoing (interventions implemented as appropriate)

## 2018-04-08 NOTE — H&P
"  .     Admission Note    Patient Identification:  Elisabet JEFFERSON Bicer  62 y.o.  female  1955  [id number]            CC: \"Tripped and fell and passed out multiple times.\"    History of Present Illness:  Patient is a 62-year-old female.  Smokes a history of diabetes as well as hypertension who lives in [facility].  Patient was visiting her daughter in [facility] to look for apartments.  Subsequently stumbled out of her daughter's car and then apparently tripped over no particular object going down the driveway.  She subsequently then stood up and her daughter describes it as wiping out.  She did not lose consciousness.  She became very weak.  She was brought to the emergency room.  She denied any chest pain did have pain in her arm.  Upon interview she also did have a syncopal episode or stumbling event about 2 weeks ago.  She actually fell downstairs at this time.  Also of note she has been being treated for 2 infections in her toes.  She is even had the left fourth digit operated on recently.    Upon interview the patient is able to speak in full sentences.  She will answer questions with shaking her head.  She is not very interactive.  She will follow commands.  She moves all extremities upon command.  She is able to state her name as well as identify family members.  She opens her eyes to request.  However she does appear very fatigued.    In the emergency room investigations revealed tachycardia however stable blood pressure and patient's oxygenation is 97% room air.  She did however spike a fever to 38.9.  Lab investigations showed a negative tox screen urinalysis without signs of infection however did show significant glucose or urea and ketonuria.  Her condition panel showed hyperkalemia hyponatremia acute kidney injury with creatinine 1.3 hyperglycemia with a glucose of 437 transaminitis as well as elevation of alkaline phosphatase.  Anion gap was 14.8.  Bicarbonate was 24.  Troponins is " negative.  A CT scan was performed in the emergency room which showed no stroke or no acute intracranial pathology.  The CT scan was performed over 6 hours past her initial symptoms.  Pro-calcitonin was elevated.    Radiology imaging of the head was significant for no acute intracranial abnormality.  C-spine CT scan showed some lucency around the C3 fixation screw suggesting some lucency.  Showed postsurgical and degenerative changes but no acute osseous findings.  CT scan of abdomen showed no source of infection did show some constipation.  Chest x-ray was clear.    No past medical history on file.    No past surgical history on file.     Social History     Social History   • Marital status:      Spouse name: N/A   • Number of children: N/A   • Years of education: N/A     Occupational History   • Not on file.     Social History Main Topics   • Smoking status: Not on file   • Smokeless tobacco: Not on file   • Alcohol use Not on file   • Drug use: Unknown   • Sexual activity: Not on file     Other Topics Concern   • Not on file     Social History Narrative   • No narrative on file       No family history on file.      (Not in a hospital admission)    No Known Allergies    Review of Systems:  Unable to obtain accurate ROS as patient is unable to answer questions due to patient lethargy and altered mental status.    Physical Exam:  Vitals:  Vitals:    04/07/18 2132 04/07/18 2200 04/07/18 2211 04/07/18 2212   BP: 106/80  108/64 108/64   BP Location:   Left arm    Patient Position:   Lying    Pulse: (!) 131 (!) 128 (!) 129 (!) 129   Resp:  18 18 16   Temp:  (!) 102.1 °F (38.9 °C)     TempSrc:  Oral     SpO2: 95% 97% 96% 97%   Weight:       Height:               Body mass index is 36.32 kg/m².    Intake/Output Summary (Last 24 hours) at 04/07/18 2238  Last data filed at 04/07/18 2158   Gross per 24 hour   Intake             1000 ml   Output                0 ml   Net             1000 ml       Exam:  GENERAL:  Ill  appearing, lethargic  HEENT:  EOMI, nonicteric sclera, no JVD Pupils equal reactive responsive to light  MM dry  PULMONARY:    CTAB, no wheeze, no crackles,no rhonchi, symmetric air entry  CARDIAC: tachy reg, no murmur  ABD: obese SNTND BS+  EXT: no c/c/e, pulses symmetric 2+ bilaterally  Left lower 3 digit with significant puss and erythema, right lower ext digit as well with some erythema  NEURO:  CNs II-XII intact, strength 4/5 symmetric in BUE and BLE, will follow commands, speech intact, memory intact,   SKIN: as above, lac on head as well, anterior neck scar  LYMPH: no cervical LAD    Scheduled meds:    [START ON 4/8/2018] insulin regular 0-14 Units Subcutaneous Q6H   vancomycin 20 mg/kg Intravenous Once       Data Review:   I reviewed the patient's medications and new clinical results.  Lab Results   Component Value Date    CALCIUM 9.7 04/07/2018     Results from last 7 days  Lab Units 04/07/18  1858   AST (SGOT) U/L 48*   MAGNESIUM mg/dL 1.8   SODIUM mmol/L 130*   POTASSIUM mmol/L 5.8*   CHLORIDE mmol/L 91*   CO2 mmol/L 24.2   BUN mg/dL 30*   CREATININE mg/dL 1.30*   GLUCOSE mg/dL 437*   CALCIUM mg/dL 9.7   WBC 10*3/mm3 8.38   HEMOGLOBIN g/dL 14.5   PLATELETS 10*3/mm3 104*   ALT (SGPT) U/L 63*       Results from last 7 days  Lab Units 04/07/18  1858   CK TOTAL U/L 62   TROPONIN T ng/mL <0.010         Estimated Creatinine Clearance: 53.4 mL/min (by C-G formula based on SCr of 1.3 mg/dL (H)).    IMAGING:  I have reviewed all imaging studies since my last documentation.  Imaging Results (most recent)     Procedure Component Value Units Date/Time    CT Abdomen Pelvis Without Contrast [[id number]] Collected:  04/07/18 2021     Updated:  04/07/18 2028    Narrative:       NONCONTRAST CT SCANS ABDOMEN AND PELVIS     HISTORY: ab pain     COMPARISON: None.     TECHNIQUE:Radiation dose reduction techniques were utilized, including  automated exposure control and exposure modulation based on body size.   Axial images  were obtained from the lung bases to the symphysis pubis  without oral or IV contrast per request.      FINDINGS ABDOMEN CT:  There is motion and body habitus related artifact.  Within these limitations, the solid organs appear grossly unremarkable.  Aorta nonaneurysmal. No ascites. Numerous colonic diverticuli, normal  appendix.     FINDINGS PELVIS CT:  The GI tract not opacified for assessment but non  obstructive in appearance. Abundant fecal material suggests  constipation. Bone windows demonstrate nondisplaced fractures of the  right L1 and L2 transverse processes.                Impression:       1.  Colonic diverticulosis, constipation.  2. Nondisplaced right transverse process fractures of L1 and L2.  3. Grossly unremarkable noncontrast exam otherwise considering artifact.     This report was finalized on 4/7/2018 8:25 PM by Gian Vance MD.       CT Cervical Spine Without Contrast [[id number]] Collected:  04/07/18 2017     Updated:  04/07/18 2024    Narrative:       NONCONTRAST CT SCAN CERVICAL SPINE     CLINICAL HISTORY: head injury     COMPARISON: None.     TECHNIQUE: Radiation dose reduction techniques were utilized, including  automated exposure control and exposure modulation based on body size.  Axial noncontrast images of the cervical spine were obtained without  contrast. Sagittal reformatted images were supplemented.     FINDINGS:  No acute vertebral fracture identified on the axial series.  There are extensive postsurgical changes. Partial corpectomy defect of  the C4 vertebra is noted along the more central and right lateral  aspect. A metallic plate traverses the C3-C6 vertebrae anteriorly with  bilateral fixation screws at C3, C5, and C6. There is some lucency about  both of the C3 fixation screw suggesting loosening. The C5 and C6  fixation screws appear to be well secured.     The vertebrae are well aligned and well maintained in height and stature  on the sagittal reformatted images.  No  significant compression  deformity or retropulsion.     Degenerative changes are present. These are most pronounced in the mid  and lower cervical spine, particularly at C6-7 followed by C5-6.  Marginal osteophytes contribute to multilevel canal and foraminal  narrowing, again mostly in the mid and lower cervical levels. The facets  are well aligned. Mild multilevel facet arthropathy is present.                      Impression:       1. Postsurgical and degenerative changes as discussed, no acute osseous  finding     This report was finalized on 4/7/2018 8:20 PM by Gian Vance MD.       CT Head Without Contrast [[id number]] Collected:  04/07/18 2016     Updated:  04/07/18 2020    Narrative:       CRANIAL CT SCAN WITHOUT CONTRAST     CLINICAL HISTORY: head injury     COMPARISON: None.     TECHNIQUE: Radiation dose reduction techniques were utilized, including  automated exposure control and exposure modulation based on body size.  Multiple axial images of the head were obtained without contrast.      FINDINGS:  There are no abnormal areas of increased density or mass  effect.      Ventricles, sulci, and cisterns appear normal. Bone window  images are unremarkable.                Impression:       1. No acute intracranial abnormality.                     This study was performed with techniques to keep radiation doses as low  as reasonably achievable (ALARA). Individualized dose reduction  techniques using automated exposure control or adjustment of mA and/or  kV according to the patient size were employed.      This report was finalized on 4/7/2018 8:17 PM by Gian Vance MD.       XR Chest 1 View [[id number]] Collected:  04/07/18 2003     Updated:  04/07/18 2007    Narrative:       PORTABLE CHEST X-RAY     HISTORY: short of breath     COMPARISON: None.     FINDINGS: Portable AP view of the chest was obtained. Lungs are under  aerated but grossly  clear where visualized. Cardiac margins largely  obscured.  Vascularity felt to be normal considering poor inspiration.    No obvious significant pleural fluid collections.              Impression:          Expiratory radiograph without gross active air space disease process     This report was finalized on 4/7/2018 8:04 PM by Gian Vance MD.             ASSESSMENT /   PLAN:  Sepsis  TME  Hyperglycemia  Hyponatremia  Hyperkalemia  Transaminitis  Diabetes type 2  TME  Concern for osteomyelitis  Cellulitis    Will admit the patient to the ICU.  Very prudent at this point giving fevers generalized weakness elevated pro-calcitonin and very concerning left lower sternal digit to treat for sepsis.  We will obtain lactate and trend this if needed.  We'll obtain blood cultures ×2.  Will give broad-spectrum antibiotics including vancomycin.  We'll have vascular surgery consulted to evaluate this lower shin B wound.  Concerned that this may need to be amputated.    We'll give her aggressive IV fluids.  Will repeat renal function panel to manage her hyperkalemia.  Give insulin as well for hyperglycemia and hyperkalemia.  She has no anion gap at this point.  Bicarbonate is 24 as well.    We will also continue to trend out cardiac enzymes.  We will also consider further head imaging if above investigations did not reveal cause.  May also need to consider further imaging of the C3 abnormal screw lucency with MRI if patient not responsive to therapy for sepsis.    Very sick lady will need to be closely monitored.  I did discuss this with the patient's  and daughter.  They would want her to be a full code at the present time.          Total critical care time was 65 minutes, excluding any separately billable procedure time.    Christian Mcknight MD  [facility]  04/07/18  10:38 PM

## 2018-04-08 NOTE — CONSULTS
Encounter Date: 4/8/2018    CHIEF COMPLAINT: Altered Mental status    Patient Active Problem List   Diagnosis   • Generalized weakness       PRESENT ILLNESS:    Portions of this notes is copied from previous physician encounters, reviewed and edited appropriately.    Background:     Elisabet Berry is a 62 y.o. female with history of severe sepsis in the third toe cellulitis/osteomyelitis, acidosis, gram-positive cocci bacteremia, diabetes type II, now has altered mental status.    Patient was admitted yesterday.  As per the daughter was present by the bedside patient has this right-sided weakness since yesterday where she is holding up her right shoulder and protecting it and not moving much.  Same thing in bilateral lower extremities patient is not moving much except on occasion she tries to adjust herself.  On arrival by the bedside patient is extremely tired sleepy arousable with pain and opens eyes on occasion follows commands and occasion tried to communicate but on many occasions patient does not participate in the history or exam.    Blood gases done stat showed severe hypoxia and acidosis with elevated bicarbonate.  Narcan did not help.  We have started her on oxygen and arranged brain MRI stat to rule out infective endocarditis.    Review of systems   Cannot review systems because of altered mental status.    Past Medical History:   Diagnosis Date   • Arthritis    • Diabetes mellitus    • Gout    • Peripheral neuropathy        No past surgical history on file.    Current Facility-Administered Medications   Medication Dose Route Frequency Provider Last Rate Last Dose   • acetaminophen (TYLENOL) suppository 650 mg  650 mg Rectal Q4H PRN Christian Mcknight MD   650 mg at 04/08/18 0626   • dextrose (D50W) solution 25 g  25 g Intravenous Q15 Min PRN Christian Mcknight MD       • dextrose (GLUTOSE) oral gel 15 g  15 g Oral Q15 Min PRN Christian Mcknight MD       • glucagon (human recombinant) (GLUCAGEN  DIAGNOSTIC) injection 1 mg  1 mg Subcutaneous PRN Christian Mcknight MD       • insulin regular (humuLIN R,novoLIN R) injection 0-14 Units  0-14 Units Subcutaneous Q4H Paul Key MD   8 Units at 04/08/18 1707   • lactated ringers infusion  100 mL/hr Intravenous Continuous Paul Key  mL/hr at 04/08/18 0935 100 mL/hr at 04/08/18 0935   • morphine injection 2 mg  2 mg Intravenous Q3H PRN Paul Key MD   2 mg at 04/08/18 1004   • morphine injection 4 mg  4 mg Intravenous Q4H PRN Paul Key MD       • Pharmacy to dose vancomycin   Does not apply Continuous PRN Christian Mcknight MD       • piperacillin-tazobactam (ZOSYN) 3.375 g in iso-osmotic dextrose 50 ml (premix)  3.375 g Intravenous Q8H Christian Mcknight MD   3.375 g at 04/08/18 1442   • sodium chloride 0.9 % flush 1-10 mL  1-10 mL Intravenous PRN Christian Mcknight MD       • sodium chloride 0.9 % flush 10 mL  10 mL Intravenous PRN Antolin Montero MD       • vancomycin 1250 mg/250 mL 0.9% NS IVPB ([facility])  12 mg/kg Intravenous Q12H Christian Mcknight MD   1,250 mg at 04/08/18 1211       No Known Allergies    Social History     Social History   • Marital status:      Spouse name: N/A   • Number of children: N/A   • Years of education: N/A     Occupational History   • Not on file.     Social History Main Topics   • Smoking status: Never Smoker   • Smokeless tobacco: Not on file   • Alcohol use Not on file   • Drug use: Unknown   • Sexual activity: Not on file     Other Topics Concern   • Not on file     Social History Narrative   • No narrative on file       No family history on file.    No family status information on file.       No current facility-administered medications on file prior to encounter.      No current outpatient prescriptions on file prior to encounter.           PHYSICAL EXAMINATION:    Vitals: No data found.    Temp:  [97 °F (36.1 °C)-103.6 °F (39.8 °C)] 99.9 °F (37.7 °C)  Heart Rate:  [115-134]  115  Resp:  [14-22] 18  BP: (106-158)/(50-90) 123/56    General: Sleepy arousable with pain.  HEENT: No pallor or icterus. Neck supple. No Carotid bruits.  Heart: S1 and S2 normal with regular rhythm.  No murmur.       GENERAL NEUROLOGIC EXAM     Mental Status:  sleepy arousable with pain on occasion follow simple commands , not oriented and does not talk much.  And can track on occasion and sometimes looks around .   fund of knowledge attention span and concentration cannot be assessed as patient does not precipitate      Cranial nerves:  pupils bilaterally equal reacting no obvious facial asymmetry rest of the cranial nerves cannot be tested as patient does not part assessment     Motor:  right arm in flexed position since yesterday as per daughter , patient fight exam on the right side (?  Pain in the right shoulder which is chronic as per family ).  Patient does not lift bilateral upper extremities for commands but can withdraw to pain bilaterally equally      Sensation: Withdrew to pain bilaterally.     Reflexes: 2+ bilaterally symmetrical with down going toes.    Coordination:  gait cannot be tested    Labs:     Lab Results   Component Value Date    HGB 11.2 (L) 04/08/2018    HCT 34.9 (L) 04/08/2018    WBC 9.46 04/08/2018    PLT 86 (L) 04/08/2018     Lab Results   Component Value Date    BUN 22 04/08/2018    CALCIUM 6.9 (L) 04/08/2018     04/08/2018    K 3.6 04/08/2018     04/08/2018    CO2 17.1 (L) 04/08/2018     Lab Results   Component Value Date    ALT 39 (H) 04/08/2018    AST 30 04/08/2018    BILITOT 0.8 04/08/2018     Lab Results   Component Value Date    MG 1.8 04/07/2018     No components found for: POCGLUC  No components found for: A1C  No results found for: HDL, LDL  No components found for: B12  No results found for: TSH    Lab Results (last 24 hours)     Procedure Component Value Units Date/Time    Hemoglobin A1c [[id number]]  (Abnormal) Collected:  04/08/18 0059    Specimen:  Blood  Updated:  04/08/18 1705     Hemoglobin A1C 10.80 (H) %     Narrative:       Hemoglobin A1C Ranges:    Increased Risk for Diabetes  5.7% to 6.4%  Diabetes                     >= 6.5%  Diabetic Goal                < 7.0%    Uric Acid [[id number]]  (Normal) Collected:  04/08/18 1604    Specimen:  Blood from Arm, Right Updated:  04/08/18 1637     Uric Acid 3.1 mg/dL     Troponin [[id number]]  (Normal) Collected:  04/08/18 1604    Specimen:  Blood from Arm, Right Updated:  04/08/18 1636     Troponin T <0.010 ng/mL     Narrative:       Troponin T Reference Ranges:  Less than 0.03 ng/mL:    Negative for AMI  0.03 to 0.09 ng/mL:      Indeterminant for AMI  Greater than 0.09 ng/mL: Positive for AMI    Lactic Acid, Plasma [[id number]]  (Normal) Collected:  04/08/18 1604    Specimen:  Blood from Arm, Right Updated:  04/08/18 1636     Lactate 1.5 mmol/L     Blood Culture - Blood, [[id number]]  (Abnormal) Collected:  04/07/18 2250    Specimen:  Blood from Arm, Left Updated:  04/08/18 1621     Blood Culture Abnormal Stain (A)     Gram Stain Result Anaerobic Bottle Gram positive cocci in chains      Aerobic Bottle Gram positive cocci in chains    Blood Gas, Arterial [[id number]]  (Abnormal) Collected:  04/08/18 1606    Specimen:  Arterial Blood Updated:  04/08/18 1609     Site Arterial: right radial     Kieran's Test Positive     pH, Arterial 7.469 (H) pH units      pCO2, Arterial 22.9 (L) mm Hg      pO2, Arterial 71.2 (L) mm Hg      HCO3, Arterial 16.6 (L) mmol/L      Base Excess, Arterial -5.2 (L) mmol/L      O2 Saturation Calculated 95.5 %      A-a Gradiant 0.6 mmHg      Barometric Pressure for Blood Gas 749.7 mmHg      Modality Room Air     FIO2 21 %      Set [facility] Resp Rate 25    Narrative:       o2 sat 94 Meter: [id number] : [id number] Lanceyifan Tesfaye    POC Glucose Once [[id number]]  (Abnormal) Collected:  04/08/18 1515    Specimen:  Blood Updated:  04/08/18 1527     Glucose 298 (H) mg/dL     Narrative:       Meter: [id number]  : [id number] Carlitos MILLER RN    Troponin [[id number]]  (Normal) Collected:  04/08/18 1051    Specimen:  Blood Updated:  04/08/18 1124     Troponin T <0.010 ng/mL     Narrative:       Troponin T Reference Ranges:  Less than 0.03 ng/mL:    Negative for AMI  0.03 to 0.09 ng/mL:      Indeterminant for AMI  Greater than 0.09 ng/mL: Positive for AMI    POC Glucose Once [[id number]]  (Abnormal) Collected:  04/08/18 1120    Specimen:  Blood Updated:  04/08/18 1123     Glucose 256 (H) mg/dL     Narrative:       Meter: [id number] : [id number] Deepak Emily NOAH    [facility] Spotted Fever, IgG [[id number]] Collected:  04/08/18 1051    Specimen:  Blood Updated:  04/08/18 1055    [place] Spotted Fever, IgM [[id number]] Collected:  04/08/18 1051    Specimen:  Blood Updated:  04/08/18 1055    Ehrlichia Antibody Panel [[id number]] Collected:  04/08/18 1051    Specimen:  Blood Updated:  04/08/18 1055    B. Burgdorferi Antibodies, WB Reflex [[id number]] Collected:  04/08/18 1051    Specimen:  Blood Updated:  04/08/18 1055    Babesia Microti Antibody Panel [[id number]] Collected:  04/08/18 1051    Specimen:  Blood Updated:  04/08/18 1055    Blood Culture - Blood, [[id number]]  (Normal) Collected:  04/07/18 2213    Specimen:  Blood from Hand, Left Updated:  04/08/18 1031     Blood Culture No growth at less than 24 hours    POC Glucose Once [[id number]]  (Abnormal) Collected:  04/08/18 0629    Specimen:  Blood Updated:  04/08/18 0640     Glucose 255 (H) mg/dL     Narrative:       Meter: [id number] : [id number] Kadeem Hayes RN    Troponin [[id number]]  (Normal) Collected:  04/08/18 0446    Specimen:  Blood Updated:  04/08/18 0532     Troponin T <0.010 ng/mL     Narrative:       Troponin T Reference Ranges:  Less than 0.03 ng/mL:    Negative for AMI  0.03 to 0.09 ng/mL:      Indeterminant for AMI  Greater than 0.09 ng/mL: Positive for AMI    Lactic Acid, Reflex Timer (This will reflex a repeat order 3-3:15 hours after ordered.)  [[id number]] Collected:  04/07/18 2214    Specimen:  Blood Updated:  04/08/18 0303     Extra Tube Hold for add-ons.     Comment: Auto resulted.       Lactic Acid, Plasma [[id number]]  (Abnormal) Collected:  04/08/18 0216    Specimen:  Blood Updated:  04/08/18 0246     Lactate 2.4 (C) mmol/L     Comprehensive Metabolic Panel [[id number]]  (Abnormal) Collected:  04/08/18 0059    Specimen:  Blood Updated:  04/08/18 0142     Glucose 316 (H) mg/dL      BUN 22 mg/dL      Creatinine 0.92 mg/dL      Sodium 139 mmol/L      Potassium 3.6 mmol/L      Chloride 105 mmol/L      CO2 17.1 (L) mmol/L      Calcium 6.9 (L) mg/dL      Total Protein 5.5 (L) g/dL      Albumin 2.80 (L) g/dL      ALT (SGPT) 39 (H) U/L      AST (SGOT) 30 U/L      Alkaline Phosphatase 74 U/L      Total Bilirubin 0.8 mg/dL      eGFR Non African Amer 62 mL/min/1.73      Globulin 2.7 gm/dL      A/G Ratio 1.0 g/dL      BUN/Creatinine Ratio 23.9     Anion Gap 16.9 mmol/L     Troponin [[id number]]  (Normal) Collected:  04/08/18 0059    Specimen:  Blood Updated:  04/08/18 0139     Troponin T <0.010 ng/mL     Narrative:       Troponin T Reference Ranges:  Less than 0.03 ng/mL:    Negative for AMI  0.03 to 0.09 ng/mL:      Indeterminant for AMI  Greater than 0.09 ng/mL: Positive for AMI    CBC & Differential [[id number]] Collected:  04/08/18 0059    Specimen:  Blood Updated:  04/08/18 0138    Narrative:       The following orders were created for panel order CBC & Differential.  Procedure                               Abnormality         Status                     ---------                               -----------         ------                     CBC Auto Differential[[id number]]        Abnormal            Final result                 Please view results for these tests on the individual orders.    CBC Auto Differential [[id number]]  (Abnormal) Collected:  04/08/18 0059    Specimen:  Blood Updated:  04/08/18 0138     WBC 9.46 10*3/mm3      RBC 3.51 (L) 10*6/mm3       Hemoglobin 11.2 (L) g/dL      Hematocrit 34.9 (L) %      MCV 99.4 (H) fL      MCH 31.9 pg      MCHC 32.1 (L) g/dL      RDW 13.4 (H) %      RDW-SD 48.7 fl      MPV 11.0 fL      Platelets 86 (L) 10*3/mm3      Neutrophil % 88.5 (H) %      Lymphocyte % 7.1 (L) %      Monocyte % 4.0 (L) %      Eosinophil % 0.0 (L) %      Basophil % 0.1 %      Immature Grans % 0.3 %      Neutrophils, Absolute 8.37 (H) 10*3/mm3      Lymphocytes, Absolute 0.67 (L) 10*3/mm3      Monocytes, Absolute 0.38 10*3/mm3      Eosinophils, Absolute 0.00 10*3/mm3      Basophils, Absolute 0.01 10*3/mm3      Immature Grans, Absolute 0.03 10*3/mm3     Blood Gas, Arterial [[id number]]  (Abnormal) Collected:  04/08/18 0015    Specimen:  Arterial Blood Updated:  04/08/18 0017     Site Arterial: left radial     Kieran's Test Positive     pH, Arterial 7.453 (H) pH units      pCO2, Arterial 26.6 (L) mm Hg      pO2, Arterial 75.1 (L) mm Hg      HCO3, Arterial 18.6 (L) mmol/L      Base Excess, Arterial -3.8 (L) mmol/L      O2 Saturation Calculated 95.9 %      A-a Gradiant 0.6 mmHg      Barometric Pressure for Blood Gas 751.9 mmHg      Modality Room Air     FIO2 21 %      Rate 24 Breaths/minute     Narrative:       sats=94% Meter: [id number] : [id number] [facility]    POC Glucose Once [[id number]]  (Abnormal) Collected:  04/08/18 0006    Specimen:  Blood Updated:  04/08/18 0015     Glucose 394 (H) mg/dL     Narrative:       Meter: [id number] : [id number] Nicki Harden RN    Respiratory Panel, PCR - Swab, Nasopharynx [[id number]]  (Normal) Collected:  04/07/18 2217    Specimen:  Swab from Nasopharynx Updated:  04/08/18 0004     ADENOVIRUS, PCR Not Detected     Coronavirus 229E Not Detected     Coronavirus HKU1 Not Detected     Coronavirus NL63 Not Detected     Coronavirus OC43 Not Detected     Human Metapneumovirus Not Detected     Human Rhinovirus/Enterovirus Not Detected     Influenza B PCR Not Detected     Parainfluenza Virus 1 Not Detected      "Parainfluenza Virus 2 Not Detected     Parainfluenza Virus 3 Not Detected     Parainfluenza Virus 4 Not Detected     Bordetella pertussis pcr Not Detected     Influenza A H1 2009 PCR Not Detected     Chlamydophila pneumoniae PCR Not Detected     Mycoplasma pneumo by PCR Not Detected     Influenza A PCR Not Detected     Influenza A H3 Not Detected     Influenza A H1 Not Detected     RSV, PCR Not Detected    Lactic Acid, Plasma [[id number]]  (Abnormal) Collected:  04/07/18 2214    Specimen:  Blood Updated:  04/07/18 2359     Lactate 3.6 (C) mmol/L     C-reactive Protein [[id number]]  (Abnormal) Collected:  04/07/18 1858    Specimen:  Blood Updated:  04/07/18 2316     C-Reactive Protein 5.43 (H) mg/dL     Procalcitonin [[id number]]  (Abnormal) Collected:  04/07/18 1858    Specimen:  Blood Updated:  04/07/18 2135     Procalcitonin 1.08 (C) ng/mL     Narrative:       As a Marker for Sepsis (Non-Neonates):   1. <0.5 ng/mL represents a low risk of severe sepsis and/or septic shock.  1. >2 ng/mL represents a high risk of severe sepsis and/or septic shock.    As a Marker for Lower Respiratory Tract Infections that require antibiotic therapy:  PCT on Admission     Antibiotic Therapy             6-12 Hrs later  > 0.5                Strongly Recommended            >0.25 - <0.5         Recommended  0.1 - 0.25           Discouraged                   Remeasure/reassess PCT  <0.1                 Strongly Discouraged          Remeasure/reassess PCT      As 28 day mortality risk marker: \"Change in Procalcitonin Result\" (> 80 % or <=80 %) if Day 0 (or Day 1) and Day 4 values are available. Refer to http://www.[employer]-pct-calculator.com/   Change in PCT <=80 %   A decrease of PCT levels below or equal to 80 % defines a positive change in PCT test result representing a higher risk for 28-day all-cause mortality of patients diagnosed with severe sepsis or septic shock.  Change in PCT > 80 %   A decrease of PCT levels of more than 80 % " defines a negative change in PCT result representing a lower risk for 28-day all-cause mortality of patients diagnosed with severe sepsis or septic shock.                Urine Drug Screen - Urine, Clean Catch [[id number]]  (Normal) Collected:  04/07/18 1942    Specimen:  Urine from Urine, Clean Catch Updated:  04/07/18 2020     Amphet/Methamphet, Screen Negative     Barbiturates Screen, Urine Negative     Benzodiazepine Screen, Urine Negative     Cocaine Screen, Urine Negative     Opiate Screen Negative     THC, Screen, Urine Negative     Methadone Screen, Urine Negative     Oxycodone Screen, Urine Negative    Narrative:       Negative Thresholds For Drugs Screened:     Amphetamines               500 ng/ml   Barbiturates               200 ng/ml   Benzodiazepines            100 ng/ml   Cocaine                    300 ng/ml   Methadone                  300 ng/ml   Opiates                    300 ng/ml   Oxycodone                  100 ng/ml   THC                        50 ng/ml    The Normal Value for all drugs tested is negative. This report includes final unconfirmed screening results to be used for medical treatment purposes only. Unconfirmed results must not be used for non-medical purposes such as employment or legal testing. Clinical consideration should be applied to any drug of abuse test, particulary when unconfirmed results are used.    Urinalysis With / Microscopic If Indicated - Urine, Clean Catch [[id number]]  (Abnormal) Collected:  04/07/18 1942    Specimen:  Urine from Urine, Clean Catch Updated:  04/07/18 2004     Color, UA Yellow     Appearance, UA Clear     pH, UA 6.0     Specific Gravity, UA 1.028     Glucose, UA >=1000 mg/dL (3+) (A)     Ketones, UA 15 mg/dL (1+) (A)     Bilirubin, UA Negative     Blood, UA Negative     Protein, UA Negative     Leuk Esterase, UA Negative     Nitrite, UA Negative     Urobilinogen, UA 0.2 E.U./dL    Narrative:       Urine microscopic not indicated.    [place] Draw  [[id number]] Collected:  04/07/18 1858    Specimen:  Blood Updated:  04/07/18 2001    Narrative:       The following orders were created for panel order [place] Draw.  Procedure                               Abnormality         Status                     ---------                               -----------         ------                     Light Blue Top[[id number]]                                   Final result               Green Top (Gel)[[id number]]                                  Final result               Lavender Top[[id number]]                                     Final result               Gold Top - SST[[id number]]                                   Final result                 Please view results for these tests on the individual orders.    Light Blue Top [[id number]] Collected:  04/07/18 1858    Specimen:  Blood Updated:  04/07/18 2001     Extra Tube hold for add-on     Comment: Auto resulted       Green Top (Gel) [[id number]] Collected:  04/07/18 1858    Specimen:  Blood Updated:  04/07/18 2001     Extra Tube Hold for add-ons.     Comment: Auto resulted.       Lavender Top [[id number]] Collected:  04/07/18 1858    Specimen:  Blood Updated:  04/07/18 2001     Extra Tube hold for add-on     Comment: Auto resulted       Gold Top - SST [[id number]] Collected:  04/07/18 1858    Specimen:  Blood Updated:  04/07/18 2001     Extra Tube Hold for add-ons.     Comment: Auto resulted.       CK [[id number]]  (Normal) Collected:  04/07/18 1858    Specimen:  Blood Updated:  04/07/18 1951     Creatine Kinase 62 U/L     Ethanol [[id number]] Collected:  04/07/18 1858    Specimen:  Blood Updated:  04/07/18 1951     Ethanol <10 mg/dL      Ethanol % <0.010 %     Comprehensive Metabolic Panel [[id number]]  (Abnormal) Collected:  04/07/18 1858    Specimen:  Blood Updated:  04/07/18 1934     Glucose 437 (C) mg/dL      BUN 30 (H) mg/dL      Creatinine 1.30 (H) mg/dL      Sodium 130 (L) mmol/L      Potassium 5.8 (H) mmol/L      Chloride 91  (L) mmol/L      CO2 24.2 mmol/L      Calcium 9.7 mg/dL      Total Protein 8.0 g/dL      Albumin 4.30 g/dL      ALT (SGPT) 63 (H) U/L      AST (SGOT) 48 (H) U/L      Alkaline Phosphatase 127 (H) U/L      Total Bilirubin 0.8 mg/dL      eGFR Non African Amer 41 (L) mL/min/1.73      eGFR  African Amer 50 (L) mL/min/1.73      Globulin 3.7 gm/dL      A/G Ratio 1.2 g/dL      BUN/Creatinine Ratio 23.1     Anion Gap 14.8 mmol/L     Troponin [[id number]]  (Normal) Collected:  04/07/18 1858    Specimen:  Blood Updated:  04/07/18 1930     Troponin T <0.010 ng/mL     Narrative:       Troponin T Reference Ranges:  Less than 0.03 ng/mL:    Negative for AMI  0.03 to 0.09 ng/mL:      Indeterminant for AMI  Greater than 0.09 ng/mL: Positive for AMI    Magnesium [[id number]]  (Normal) Collected:  04/07/18 1858    Specimen:  Blood Updated:  04/07/18 1930     Magnesium 1.8 mg/dL     CBC & Differential [[id number]] Collected:  04/07/18 1858    Specimen:  Blood Updated:  04/07/18 1911    Narrative:       The following orders were created for panel order CBC & Differential.  Procedure                               Abnormality         Status                     ---------                               -----------         ------                     CBC Auto Differential[[id number]]        Abnormal            Final result                 Please view results for these tests on the individual orders.    CBC Auto Differential [[id number]]  (Abnormal) Collected:  04/07/18 1858    Specimen:  Blood Updated:  04/07/18 1911     WBC 8.38 10*3/mm3      RBC 4.43 10*6/mm3      Hemoglobin 14.5 g/dL      Hematocrit 42.6 %      MCV 96.2 fL      MCH 32.7 (H) pg      MCHC 34.0 g/dL      RDW 13.4 (H) %      RDW-SD 46.6 fl      MPV 11.6 fL      Platelets 104 (L) 10*3/mm3      Neutrophil % 89.2 (H) %      Lymphocyte % 6.9 (L) %      Monocyte % 3.5 (L) %      Eosinophil % 0.1 (L) %      Basophil % 0.1 %      Immature Grans % 0.2 %      Neutrophils, Absolute 7.47  10*3/mm3      Lymphocytes, Absolute 0.58 (L) 10*3/mm3      Monocytes, Absolute 0.29 10*3/mm3      Eosinophils, Absolute 0.01 10*3/mm3      Basophils, Absolute 0.01 10*3/mm3      Immature Grans, Absolute 0.02 10*3/mm3           .  Imaging Results (last 24 hours)     Procedure Component Value Units Date/Time    XR Shoulder 2+ View Right [[id number]] Collected:  04/08/18 1634     Updated:  04/08/18 1634    Narrative:       RIGHT SHOULDER 2 VIEWS 04/08/2018      HISTORY: Right shoulder pain.     FINDINGS: There is some minimal hypertrophic change of the inferior  glenoid. No fractures or dislocations are seen. AC joint appears intact.       Impression:       1. No acute process.  2. Minimal hypertrophic change in the inferior glenoid.       CT Abdomen Pelvis Without Contrast [[id number]] Collected:  04/07/18 2021     Updated:  04/07/18 2028    Narrative:       NONCONTRAST CT SCANS ABDOMEN AND PELVIS     HISTORY: ab pain     COMPARISON: None.     TECHNIQUE:Radiation dose reduction techniques were utilized, including  automated exposure control and exposure modulation based on body size.   Axial images were obtained from the lung bases to the symphysis pubis  without oral or IV contrast per request.      FINDINGS ABDOMEN CT:  There is motion and body habitus related artifact.  Within these limitations, the solid organs appear grossly unremarkable.  Aorta nonaneurysmal. No ascites. Numerous colonic diverticuli, normal  appendix.     FINDINGS PELVIS CT:  The GI tract not opacified for assessment but non  obstructive in appearance. Abundant fecal material suggests  constipation. Bone windows demonstrate nondisplaced fractures of the  right L1 and L2 transverse processes.                Impression:       1.  Colonic diverticulosis, constipation.  2. Nondisplaced right transverse process fractures of L1 and L2.  3. Grossly unremarkable noncontrast exam otherwise considering artifact.     This report was finalized on 4/7/2018  8:25 PM by Gian Vance MD.       CT Cervical Spine Without Contrast [[id number]] Collected:  04/07/18 2017     Updated:  04/07/18 2024    Narrative:       NONCONTRAST CT SCAN CERVICAL SPINE     CLINICAL HISTORY: head injury     COMPARISON: None.     TECHNIQUE: Radiation dose reduction techniques were utilized, including  automated exposure control and exposure modulation based on body size.  Axial noncontrast images of the cervical spine were obtained without  contrast. Sagittal reformatted images were supplemented.     FINDINGS:  No acute vertebral fracture identified on the axial series.  There are extensive postsurgical changes. Partial corpectomy defect of  the C4 vertebra is noted along the more central and right lateral  aspect. A metallic plate traverses the C3-C6 vertebrae anteriorly with  bilateral fixation screws at C3, C5, and C6. There is some lucency about  both of the C3 fixation screw suggesting loosening. The C5 and C6  fixation screws appear to be well secured.     The vertebrae are well aligned and well maintained in height and stature  on the sagittal reformatted images.  No significant compression  deformity or retropulsion.     Degenerative changes are present. These are most pronounced in the mid  and lower cervical spine, particularly at C6-7 followed by C5-6.  Marginal osteophytes contribute to multilevel canal and foraminal  narrowing, again mostly in the mid and lower cervical levels. The facets  are well aligned. Mild multilevel facet arthropathy is present.                      Impression:       1. Postsurgical and degenerative changes as discussed, no acute osseous  finding     This report was finalized on 4/7/2018 8:20 PM by Gian Vance MD.       CT Head Without Contrast [[id number]] Collected:  04/07/18 2016     Updated:  04/07/18 2020    Narrative:       CRANIAL CT SCAN WITHOUT CONTRAST     CLINICAL HISTORY: head injury     COMPARISON: None.     TECHNIQUE: Radiation dose reduction  techniques were utilized, including  automated exposure control and exposure modulation based on body size.  Multiple axial images of the head were obtained without contrast.      FINDINGS:  There are no abnormal areas of increased density or mass  effect.      Ventricles, sulci, and cisterns appear normal. Bone window  images are unremarkable.                Impression:       1. No acute intracranial abnormality.                     This study was performed with techniques to keep radiation doses as low  as reasonably achievable (ALARA). Individualized dose reduction  techniques using automated exposure control or adjustment of mA and/or  kV according to the patient size were employed.      This report was finalized on 4/7/2018 8:17 PM by Gian Vance MD.       XR Chest 1 View [[id number]] Collected:  04/07/18 2003     Updated:  04/07/18 2007    Narrative:       PORTABLE CHEST X-RAY     HISTORY: short of breath     COMPARISON: None.     FINDINGS: Portable AP view of the chest was obtained. Lungs are under  aerated but grossly  clear where visualized. Cardiac margins largely  obscured. Vascularity felt to be normal considering poor inspiration.    No obvious significant pleural fluid collections.              Impression:          Expiratory radiograph without gross active air space disease process     This report was finalized on 4/7/2018 8:04 PM by Gian Vance MD.             For this problem, the following was ordered:  Orders Placed This Encounter   Procedures   • Laceration Repair   • Critical Care   • Blood Culture - Blood,   • Blood Culture - Blood,   • Respiratory Panel, PCR - Swab, Nasopharynx   • CT Head Without Contrast   • CT Cervical Spine Without Contrast   • XR Chest 1 View   • CT Abdomen Pelvis Without Contrast   • MRI Foot Left With & Without Contrast   • MRI Foot Right With & Without Contrast   • XR Shoulder 2+ View Right   • MRI Brain Without Contrast   • [place] Draw   • Comprehensive  Metabolic Panel   • Magnesium   • Troponin   • CBC Auto Differential   • CK   • Urinalysis With / Microscopic If Indicated - Urine, Clean Catch   • Ethanol   • Urine Drug Screen - Urine, Clean Catch   • Procalcitonin   • Lactic Acid, Plasma   • CK   • Blood Gas, Arterial   • Basic Metabolic Panel   • Comprehensive Metabolic Panel   • Troponin   • C-reactive Protein   • Vancomycin, Trough Vancomycin dose due at 1100. Please make sure Vancomycin IV is not infusing before drawing the vancomycin level.   • Lactic Acid, Plasma   • Lactic Acid, Reflex Timer (This will reflex a repeat order 3-3:15 hours after ordered.)   • Blood Gas, Arterial   • CBC Auto Differential   • Lactic Acid, Plasma   • [place] Spotted Fever, IgG   • [place] Spotted Fever, IgM   • Ehrlichia Antibody Panel   • B. Burgdorferi Antibodies, WB Reflex   • Babesia Microti Antibody Panel   • Uric Acid   • Lactic Acid, Plasma   • Blood Gas, Arterial   • Blood Gas, Arterial   • Hemoglobin A1c   • Diet Full Liquid   • Undress and Gown   • Vital Signs   • Orthostatic blood pressure   • Do NOT Hold Basal Insulin When Patient is NPO, Hold Bolus Dose if NPO   • Follow [facility] Hypoglycemia Protocol For Blood Glucose Less Than 70 mg/dL   • Hypoglycemia Treatment - Alert Patient That is Not NPO and Can Safely Swallow   • Hypoglycemia Treatment - Patient Has IV Access - Unresponsive, NPO or Unable To Safely Swallow   • Hypoglycemia Treatment - Patient Without IV Access - Unresponsive, NPO or Unable To Safely Swallow   • Notify Provider of event. Communicate needs and document in EMR.   • Document event and patients response to treatment in EMR, any medications given to be documented on MAR.   • Vital Signs Every Hour and Per Hospital Policy Based on Patient Condition   • Cardiac Monitoring   • Continuous Pulse Oximetry   • Strict Bed Rest   • Use Mobility Guidelines for Advancement of Activity   • Height & Weight   • Daily Weights   • Intake and Output   •  Saline Lock & Maintain IV Access   • Place Sequential Compression Device   • Maintain Sequential Compression Device   • If Patient has BG Less Than 80 & is Symptomatic But Not on IV Insulin Protocol - Use Adult Hypoglycemia Treatment Orders   • Please change the patient's Accu-Cheks every 4 hours and use ICU insulin sliding scale  Nursing Communication   • Full Code   • Pulmonology (on-call MD unless specified)   • LHA (on-call MD unless specified)   • Pulmonology (on-call MD unless specified)   • L[facility] (on-call MD unless specified)   • Inpatient Vascular Surgery Consult   • Inpatient Neurology Consult   • Inpatient Infectious Diseases Consult   • Inpatient Cardiology Consult   • Oxygen Therapy- Nasal Cannula; 2 LPM; Titrate for SPO2: equal to or greater than, 92%   • SCANNED - TELEMETRY     • SCANNED - TELEMETRY     • POC Glucose Q6H   • POC Glucose Once   • POC Glucose Once   • POC Glucose Once   • POC Glucose Once   • ECG 12 Lead   • Adult Transthoracic Echo Complete W/ Cont if Necessary Per Protocol   • Insert peripheral IV   • Insert Peripheral IV   • Inpatient Admission   • Inpatient Admission   • CBC & Differential   • Light Blue Top   • Green Top (Gel)   • Lavender Top   • Gold Top - SST   • CBC & Differential       Problem List Items Addressed This Visit     Generalized weakness - Primary      Other Visit Diagnoses     Hyperglycemia        Dehydration        Altered mental status, unspecified altered mental status type        Sepsis, due to unspecified organism              ASSESSMENT/PLAN: This is a 62 y.o. female who has   Past Medical History:   Diagnosis Date   • Arthritis    • Diabetes mellitus    • Gout    • Peripheral neuropathy     presents with Altered mental status.  As per the nurses patient was interacting at 12 PM today.  However patient's daughter says that her right upper extremity is folded and flexed since yesterday.    1.  Acute encephalopathy most likely combination of hypoxia,  gram-positive sepsis, acidosis with or without infective endocarditis/CVA:  - Stat MRI brain to rule out infective endocarditis related embolism or left hemispheric CVA  - If MRI brain negative please start her on CPAP which might help  - Start oxygen  - Urgent echo to rule out infective endocarditis  - IV fluids  - Labs    Patient is not a mechanical thrombectomy or TPA as patient is more than 24 hours with her right-sided weakness as per daughter was present by the bedside.    Extremely guarded prognosis.    Thank you for allowing us to participate in the care of this patient.    Sierra Marquez MD  04/08/18  5:33 PM

## 2018-04-08 NOTE — CODE DOCUMENTATION
Blood cultures since called by lab as growing MRSA. Dr Marquez would like an ID MD consult. He also decided to order an MRI Brain.  We also gave another 0.8 mg narcan.

## 2018-04-08 NOTE — PROGRESS NOTES
Dr. REBECCA Key    [facility]    4/8/2018    Patient ID:  Name:  Elisabet Berry  MRN:  [id number]  1955  62 y.o.  female            CC/Reason for visit: Follow-up for sepsis    HPI: The patient remains somewhat confused.  Does not have recollection of her events.  She complains of thirst and hunger.  Otherwise she denies pain.  She says she's been seeing a podiatrist for a red, swollen third digit of her left foot.    Vitals:  Vitals:    04/08/18 0602 04/08/18 0620 04/08/18 0703 04/08/18 0800   BP: 126/62  126/60    BP Location:       Patient Position:       Pulse: (!) 123  (!) 123    Resp:       Temp:  (!) 103.6 °F (39.8 °C)  (!) 102.3 °F (39.1 °C)   TempSrc:  Rectal  Rectal   SpO2: 95%  94%    Weight:       Height:               Body mass index is 37.08 kg/m².    Intake/Output Summary (Last 24 hours) at 04/08/18 0920  Last data filed at 04/08/18 0100   Gross per 24 hour   Intake             3000 ml   Output              250 ml   Net             2750 ml       Exam:  GEN:  No distress, Oriented to person and date but not situation or location  EYES:   EOM-i, anicteric sclera bilat  ENT:    External ears/nose normal, OP clear  NECK:  Supple, midline trachea  LUNGS: Clear breath sounds bilat, nonlabored breathing  CV:  Normal S1S2, without murmur,Trace edema lower extremities  ABD:  Non tender, no enlarged liver or masses  EXT:  There is some erythema and swelling and increased warmth of the third digit of the left foot.    Scheduled meds:    insulin regular 0-14 Units Subcutaneous Q6H   piperacillin-tazobactam 3.375 g Intravenous Q8H   vancomycin 12 mg/kg Intravenous Q12H     IV meds:                        lactated ringers 100 mL/hr    Pharmacy to dose vancomycin     sodium chloride 200 mL/hr Last Rate: 200 mL/hr (04/08/18 0858)       Data Review:   I reviewed the patient's medications and new clinical results.  Lab Results   Component Value Date    CALCIUM 6.9 (L) 04/08/2018    MG  1.8 04/07/2018       Results from last 7 days  Lab Units 04/08/18  0059 04/07/18  1858   SODIUM mmol/L 139 130*   POTASSIUM mmol/L 3.6 5.8*   CHLORIDE mmol/L 105 91*   CO2 mmol/L 17.1* 24.2   BUN mg/dL 22 30*   CREATININE mg/dL 0.92 1.30*   CALCIUM mg/dL 6.9* 9.7   BILIRUBIN mg/dL 0.8 0.8   ALK PHOS U/L 74 127*   ALT (SGPT) U/L 39* 63*   AST (SGOT) U/L 30 48*   GLUCOSE mg/dL 316* 437*   WBC 10*3/mm3 9.46 8.38   HEMOGLOBIN g/dL 11.2* 14.5   PLATELETS 10*3/mm3 86* 104*   PROCALCITONIN ng/mL  --  1.08*           Results from last 7 days  Lab Units 04/07/18  2217   ADENOVIRUS DETECTION BY PCR  Not Detected   CORONAVIRUS 229E  Not Detected   CORONAVIRUS HKU1  Not Detected   CORONAVIRUS NL63  Not Detected   CORONAVIRUS OC43  Not Detected   HUMAN METAPNEUMOVIRUS  Not Detected   HUMAN RHINOVIRUS/ENTEROVIRUS  Not Detected   INFLUENZA B PCR  Not Detected   PARAINFLUENZA 1  Not Detected   PARAINFLUENZA VIRUS 2  Not Detected   PARAINFLUENZA VIRUS 3  Not Detected   PARAINFLUENZA VIRUS 4  Not Detected   BORDETELLA PERTUSSIS PCR  Not Detected   CHLAMYDOPHILA PNEUMONIAE PCR  Not Detected   MYCOPLAMA PNEUMO PCR  Not Detected   INFLUENZA A PCR  Not Detected   INFLUENZA A H3  Not Detected   INFLUENZA A H1  Not Detected   RSV, PCR  Not Detected         Results from last 7 days  Lab Units 04/08/18  0446 04/08/18  0059 04/07/18  1858   CK TOTAL U/L  --   --  62   TROPONIN T ng/mL <0.010 <0.010 <0.010       Results from last 7 days  Lab Units 04/08/18  0015   PH, ARTERIAL pH units 7.453*   PCO2, ARTERIAL mm Hg 26.6*   PO2 ART mm Hg 75.1*   MODALITY  Room Air   O2 SATURATION CALC % 95.9         ASSESSMENT:   Active Problems:    Generalized weakness  Sepsis  Probable infected toe  Acute kidney injury  Elevated liver enzymes  Uncontrolled diabetes type 2  Thrombocytopenia  Hyponatremia  Acute metabolic encephalopathy      PLAN:  Etiology of her sepsis is unclear.  It could be bacteremia from left foot toe infection.  On exam toe does not  appear to have any collection of pus.  We will follow blood culture results closely.  Continue broad-spectrum vancomycin and Zosyn antibiotics for now.  Lab testing shows some improvement in her liver enzymes and kidney function.  She remains hyperglycemic.  We will increase her insulin dosages.  She remains encephalopathic.  Hold all of her home medications which can cause confusion, weakness and memory lapses.  She takes Flexeril, Neurontin and Desyrel.          Paul Key MD  4/8/2018

## 2018-04-08 NOTE — CODE DOCUMENTATION
RR called due to progressive decreased responsiveness after 12 noon today. Neuro symptoms inconsistent. Dr Marquez called and came to bedside. Bolus in progress. ABG drawn showed hypoxia. 02 applied. He wants CPAP applied upon return from MRI (which was previously ordered for evaluation of injured toe.) At this time he does not believe CT imaging is required. He believes pt's deccreased LOC/aphaia is metabolic.

## 2018-04-08 NOTE — CONSULTS
Mavis Juan MD       LOS: 1 day   Patient Care Team:  No Known Provider as PCP - General    Subjective     62 y.o. female woman diabetic with sepsis syndrome of unknown etiology.  Has left 3rd toe wound of 5-6 months duration and had some surgery in [facility] for same. Da states previous eval was negative for osteomyelitis. The left 3rd toe is slightly swollen and cellulitic. No odor/discharge  Had previous right 3rd toe ulcer which has small eschar but no cellulitis    Review of Systems  Review of Systems - unable to participate      Objective     Vital Signs  Temp:  [97 °F (36.1 °C)-103.6 °F (39.8 °C)] 102.3 °F (39.1 °C)  Heart Rate:  [123-134] 123  Resp:  [14-22] 18  BP: (106-158)/(50-90) 126/60    Physical Exam  General:opens eyes.    CV: RRR  Abd: Abdomen is obese  Extremities: bounding distal pulses, left 3rd toe cellilitis    Results Review:       Recent Results (from the past 12 hour(s))   POC Glucose Once    Collection Time: 04/08/18 12:06 AM   Result Value Ref Range    Glucose 394 (H) 70 - 130 mg/dL   Blood Gas, Arterial    Collection Time: 04/08/18 12:15 AM   Result Value Ref Range    Site Arterial: left radial     Kieran's Test Positive     pH, Arterial 7.453 (H) 7.350 - 7.450 pH units    pCO2, Arterial 26.6 (L) 35.0 - 45.0 mm Hg    pO2, Arterial 75.1 (L) 80.0 - 100.0 mm Hg    HCO3, Arterial 18.6 (L) 22.0 - 28.0 mmol/L    Base Excess, Arterial -3.8 (L) 0.0 - 2.0 mmol/L    O2 Saturation Calculated 95.9 92.0 - 99.0 %    A-a Gradiant 0.6 mmHg    Barometric Pressure for Blood Gas 751.9 mmHg    Modality Room Air     FIO2 21 %    Rate 24 Breaths/minute   Troponin    Collection Time: 04/08/18 12:59 AM   Result Value Ref Range    Troponin T <0.010 0.000 - 0.030 ng/mL   Comprehensive Metabolic Panel    Collection Time: 04/08/18 12:59 AM   Result Value Ref Range    Glucose 316 (H) 65 - 99 mg/dL    BUN 22 8 - 23 mg/dL    Creatinine 0.92 0.57 - 1.00 mg/dL    Sodium 139 136 - 145 mmol/L    Potassium 3.6 3.5  - 5.2 mmol/L    Chloride 105 98 - 107 mmol/L    CO2 17.1 (L) 22.0 - 29.0 mmol/L    Calcium 6.9 (L) 8.6 - 10.5 mg/dL    Total Protein 5.5 (L) 6.0 - 8.5 g/dL    Albumin 2.80 (L) 3.50 - 5.20 g/dL    ALT (SGPT) 39 (H) 1 - 33 U/L    AST (SGOT) 30 1 - 32 U/L    Alkaline Phosphatase 74 39 - 117 U/L    Total Bilirubin 0.8 0.1 - 1.2 mg/dL    eGFR Non African Amer 62 >60 mL/min/1.73    Globulin 2.7 gm/dL    A/G Ratio 1.0 g/dL    BUN/Creatinine Ratio 23.9 7.0 - 25.0    Anion Gap 16.9 mmol/L   CBC Auto Differential    Collection Time: 04/08/18 12:59 AM   Result Value Ref Range    WBC 9.46 4.50 - 10.70 10*3/mm3    RBC 3.51 (L) 3.90 - 5.20 10*6/mm3    Hemoglobin 11.2 (L) 11.9 - 15.5 g/dL    Hematocrit 34.9 (L) 35.6 - 45.5 %    MCV 99.4 (H) 80.5 - 98.2 fL    MCH 31.9 26.9 - 32.0 pg    MCHC 32.1 (L) 32.4 - 36.3 g/dL    RDW 13.4 (H) 11.7 - 13.0 %    RDW-SD 48.7 37.0 - 54.0 fl    MPV 11.0 6.0 - 12.0 fL    Platelets 86 (L) 140 - 500 10*3/mm3    Neutrophil % 88.5 (H) 42.7 - 76.0 %    Lymphocyte % 7.1 (L) 19.6 - 45.3 %    Monocyte % 4.0 (L) 5.0 - 12.0 %    Eosinophil % 0.0 (L) 0.3 - 6.2 %    Basophil % 0.1 0.0 - 1.5 %    Immature Grans % 0.3 0.0 - 0.5 %    Neutrophils, Absolute 8.37 (H) 1.90 - 8.10 10*3/mm3    Lymphocytes, Absolute 0.67 (L) 0.90 - 4.80 10*3/mm3    Monocytes, Absolute 0.38 0.20 - 1.20 10*3/mm3    Eosinophils, Absolute 0.00 0.00 - 0.70 10*3/mm3    Basophils, Absolute 0.01 0.00 - 0.20 10*3/mm3    Immature Grans, Absolute 0.03 0.00 - 0.03 10*3/mm3   Lactic Acid, Plasma    Collection Time: 04/08/18  2:16 AM   Result Value Ref Range    Lactate 2.4 (C) 0.5 - 2.0 mmol/L   Troponin    Collection Time: 04/08/18  4:46 AM   Result Value Ref Range    Troponin T <0.010 0.000 - 0.030 ng/mL   POC Glucose Once    Collection Time: 04/08/18  6:29 AM   Result Value Ref Range    Glucose 255 (H) 70 - 130 mg/dL   POC Glucose Once    Collection Time: 04/08/18 11:20 AM   Result Value Ref Range    Glucose 256 (H) 70 - 130 mg/dL    ]      Assessment/Plan           Active Problems:    Generalized weakness      Assessment & Plan  62 y.o. female diabetic with left 3rd toe cellulitis  MRI both feet      Mavis Juan MD  04/08/18  11:23 AM

## 2018-04-08 NOTE — PROGRESS NOTES
Called by CCU team to evaluate the patient for sudden onset of aphasia.  I reviewed the prior records including the admission note done on 4/7/18 by Dr. Christian Mcknight and a follow-up note done today by Dr. Key.  I discussed the case with Dr. Key as well.    In a nutshell, patient is a 62-year-old female patient with history of diabetes who is supposed to be on Toujeo but does not take it.  She fell at home and landed on her right side.  She came into the emergency department yesterday.  CT of the head was normal.  She also had a CT of the spine which revealed surgical changes but no acute issues.  She does have a prior history of cervical disc herniation for which she had instrumentation.  She does suffer from a sore on her left third toe which has been going on for a while.  Pain is gradually increasing.  She also has significant pain in her right shoulder.  She does have chronic pain on that side but it appears to be much worse now.  She did have a rotator cuff surgery before.    Upon my evaluation, patient appears to be intermittently cooperative.  She does answer questions most of the time but able to tell her name, the year and the name of the hospital intermittently.  She follows commands intermittently as well.  On the exam, but pressure is stable.  Heart rate up to 123.  Temperature is 102.3 Fahrenheit. her breath sounds are clear.  She has dry mucous membrane.  There is warmth and redness over the left foot with the black discoloration of the skin at the tip of the third toe.  She could not move her right shoulder due to extreme pain but was a verbal to move all her extremities.  Neurological exam was hindered by lack of cooperation.    I reviewed the labs: Pro-calcitonin is elevated.  She did not have significant leukocytosis.  Blood sugar has been always elevated.  She does have metabolic acidosis, predominantly non-anion gap.      Assessment:    1. Severe sepsis, source is left third toe cellulitis  and possible osteomyelitis  2. Metabolic Encephalopathy, secondary to #1- WORSENING  3. Gram-positive cocci bacteremia  4. Diabetes mellitus type 2, insulin-dependent, uncontrolled  5. Right shoulder pain with history of rotator cuff surgery  6. Fall/syncope  7. Non-anion gap and anion gap metabolic acidosis and Respiratory alkalosis  8. Lactic acidosis, secondary to #1  9. History of gout    · Doubt primary central nervous issue such as a stroke.  She has encephalopathy secondary to her sepsis.  But would involve team D and neurology.  · Follow-up neurology recommendation   · Give LR 1 L bolus then maintenance IV fluid  · Proceed with MRI of the toe.  Also MRI her right shoulder  · Antibiotics with vancomycin and Zosyn to cover bacteremia.  Source of infection is likely diabetic toe  · Check A1c level  · Check Uric acid  · Insulin drip      Discussed with staff and family at bedside    Critical care time 38 minutes

## 2018-04-08 NOTE — PROGRESS NOTES
"Pharmacokinetic Consult - Vancomycin Dosing (Initial Note)    Elisabet Berry has been consulted for pharmacy to dose vancomycin for Sepsis.  Pharmacy dosing vancomycin per Christian Mcknight's request.   Goal trough: 15-20 mg/L   Other antimicrobials: Zosyn    Relevant clinical data and objective history reviewed:  62 y.o. female 167.6 cm (66\") 102 kg (225 lb)    No past medical history on file.  Creatinine   Date Value Ref Range Status   04/07/2018 1.30 (H) 0.57 - 1.00 mg/dL Final     BUN   Date Value Ref Range Status   04/07/2018 30 (H) 8 - 23 mg/dL Final     Estimated Creatinine Clearance: 53.4 mL/min (by C-G formula based on SCr of 1.3 mg/dL (H)).    Lab Results   Component Value Date    WBC 8.38 04/07/2018     Temp Readings from Last 3 Encounters:   04/07/18 (!) 102.1 °F (38.9 °C) (Oral)          Assessment/Plan  Will start vancomycin 1250 mg IV q12h.     Will monitor serum creatinine every 24 hours for the first 3 days then at least every 48 hours per dosing recommendations.     Vancomycin level 4/9/18 at 1030.     Pharmacy will continue to follow daily while on vancomycin and adjust as needed.     Solomon Jones, [facility]     "

## 2018-04-09 ENCOUNTER — APPOINTMENT (OUTPATIENT)
Dept: MRI IMAGING | Facility: HOSPITAL | Age: 63
End: 2018-04-09

## 2018-04-09 LAB
ALBUMIN SERPL-MCNC: 3 G/DL (ref 3.5–5.2)
ALBUMIN/GLOB SERPL: 0.9 G/DL
ALP SERPL-CCNC: 65 U/L (ref 39–117)
ALT SERPL W P-5'-P-CCNC: 42 U/L (ref 1–33)
ANION GAP SERPL CALCULATED.3IONS-SCNC: 17 MMOL/L
ASCENDING AORTA: 3.5 CM
AST SERPL-CCNC: 44 U/L (ref 1–32)
BH CV ECHO MEAS - ACS: 1.8 CM
BH CV ECHO MEAS - AO MAX PG (FULL): 2.9 MMHG
BH CV ECHO MEAS - AO MAX PG: 7.1 MMHG
BH CV ECHO MEAS - AO MEAN PG (FULL): 2 MMHG
BH CV ECHO MEAS - AO MEAN PG: 4 MMHG
BH CV ECHO MEAS - AO ROOT AREA (BSA CORRECTED): 1.4
BH CV ECHO MEAS - AO ROOT AREA: 7.1 CM^2
BH CV ECHO MEAS - AO ROOT DIAM: 3 CM
BH CV ECHO MEAS - AO V2 MAX: 133 CM/SEC
BH CV ECHO MEAS - AO V2 MEAN: 92.1 CM/SEC
BH CV ECHO MEAS - AO V2 VTI: 21.3 CM
BH CV ECHO MEAS - ASC AORTA: 3.5 CM
BH CV ECHO MEAS - AVA(I,A): 2.8 CM^2
BH CV ECHO MEAS - AVA(I,D): 2.8 CM^2
BH CV ECHO MEAS - AVA(V,A): 2.7 CM^2
BH CV ECHO MEAS - AVA(V,D): 2.7 CM^2
BH CV ECHO MEAS - BSA(HAYCOCK): 2.3 M^2
BH CV ECHO MEAS - BSA: 2.2 M^2
BH CV ECHO MEAS - BZI_BMI: 37 KILOGRAMS/M^2
BH CV ECHO MEAS - BZI_METRIC_HEIGHT: 170.2 CM
BH CV ECHO MEAS - BZI_METRIC_WEIGHT: 107.1 KG
BH CV ECHO MEAS - CONTRAST EF 4CH: 64.2 ML/M^2
BH CV ECHO MEAS - EDV(CUBED): 110.6 ML
BH CV ECHO MEAS - EDV(MOD-SP4): 109 ML
BH CV ECHO MEAS - EDV(TEICH): 107.5 ML
BH CV ECHO MEAS - EF(CUBED): 57.8 %
BH CV ECHO MEAS - EF(MOD-SP4): 64.2 %
BH CV ECHO MEAS - EF(TEICH): 49.4 %
BH CV ECHO MEAS - ESV(CUBED): 46.7 ML
BH CV ECHO MEAS - ESV(MOD-SP4): 39 ML
BH CV ECHO MEAS - ESV(TEICH): 54.4 ML
BH CV ECHO MEAS - FS: 25 %
BH CV ECHO MEAS - IVS/LVPW: 0.91
BH CV ECHO MEAS - IVSD: 1 CM
BH CV ECHO MEAS - LAT PEAK E' VEL: 11 CM/SEC
BH CV ECHO MEAS - LV DIASTOLIC VOL/BSA (35-75): 50.2 ML/M^2
BH CV ECHO MEAS - LV MASS(C)D: 181.9 GRAMS
BH CV ECHO MEAS - LV MASS(C)DI: 83.8 GRAMS/M^2
BH CV ECHO MEAS - LV MAX PG: 4.2 MMHG
BH CV ECHO MEAS - LV MEAN PG: 2 MMHG
BH CV ECHO MEAS - LV SYSTOLIC VOL/BSA (12-30): 18 ML/M^2
BH CV ECHO MEAS - LV V1 MAX: 102 CM/SEC
BH CV ECHO MEAS - LV V1 MEAN: 70.2 CM/SEC
BH CV ECHO MEAS - LV V1 VTI: 17.1 CM
BH CV ECHO MEAS - LVIDD: 4.8 CM
BH CV ECHO MEAS - LVIDS: 3.6 CM
BH CV ECHO MEAS - LVLD AP4: 8.1 CM
BH CV ECHO MEAS - LVLS AP4: 7 CM
BH CV ECHO MEAS - LVOT AREA (M): 3.5 CM^2
BH CV ECHO MEAS - LVOT AREA: 3.5 CM^2
BH CV ECHO MEAS - LVOT DIAM: 2.1 CM
BH CV ECHO MEAS - LVPWD: 1.1 CM
BH CV ECHO MEAS - MED PEAK E' VEL: 11 CM/SEC
BH CV ECHO MEAS - MV A DUR: 0.12 SEC
BH CV ECHO MEAS - MV A MAX VEL: 101 CM/SEC
BH CV ECHO MEAS - MV DEC SLOPE: 599 CM/SEC^2
BH CV ECHO MEAS - MV DEC TIME: 0.13 SEC
BH CV ECHO MEAS - MV E MAX VEL: 68.9 CM/SEC
BH CV ECHO MEAS - MV E/A: 0.68
BH CV ECHO MEAS - MV MAX PG: 5.2 MMHG
BH CV ECHO MEAS - MV MEAN PG: 3 MMHG
BH CV ECHO MEAS - MV P1/2T MAX VEL: 105 CM/SEC
BH CV ECHO MEAS - MV P1/2T: 51.3 MSEC
BH CV ECHO MEAS - MV V2 MAX: 114 CM/SEC
BH CV ECHO MEAS - MV V2 MEAN: 74.6 CM/SEC
BH CV ECHO MEAS - MV V2 VTI: 20.9 CM
BH CV ECHO MEAS - MVA P1/2T LCG: 2.1 CM^2
BH CV ECHO MEAS - MVA(P1/2T): 4.3 CM^2
BH CV ECHO MEAS - MVA(VTI): 2.8 CM^2
BH CV ECHO MEAS - PA ACC TIME: 0.11 SEC
BH CV ECHO MEAS - PA MAX PG (FULL): 2.9 MMHG
BH CV ECHO MEAS - PA MAX PG: 5.3 MMHG
BH CV ECHO MEAS - PA PR(ACCEL): 31.3 MMHG
BH CV ECHO MEAS - PA V2 MAX: 115 CM/SEC
BH CV ECHO MEAS - PULM A REVS DUR: 0.09 SEC
BH CV ECHO MEAS - PULM A REVS VEL: 43.6 CM/SEC
BH CV ECHO MEAS - PULM DIAS VEL: 37.6 CM/SEC
BH CV ECHO MEAS - PULM S/D: 0.93
BH CV ECHO MEAS - PULM SYS VEL: 34.9 CM/SEC
BH CV ECHO MEAS - PVA(V,A): 2.3 CM^2
BH CV ECHO MEAS - PVA(V,D): 2.3 CM^2
BH CV ECHO MEAS - QP/QS: 0.67
BH CV ECHO MEAS - RAP SYSTOLE: 3 MMHG
BH CV ECHO MEAS - RV MAX PG: 2.3 MMHG
BH CV ECHO MEAS - RV MEAN PG: 1 MMHG
BH CV ECHO MEAS - RV V1 MAX: 76.6 CM/SEC
BH CV ECHO MEAS - RV V1 MEAN: 46.6 CM/SEC
BH CV ECHO MEAS - RV V1 VTI: 11.4 CM
BH CV ECHO MEAS - RVOT AREA: 3.5 CM^2
BH CV ECHO MEAS - RVOT DIAM: 2.1 CM
BH CV ECHO MEAS - SI(AO): 69.4 ML/M^2
BH CV ECHO MEAS - SI(CUBED): 29.5 ML/M^2
BH CV ECHO MEAS - SI(LVOT): 27.3 ML/M^2
BH CV ECHO MEAS - SI(MOD-SP4): 32.3 ML/M^2
BH CV ECHO MEAS - SI(TEICH): 24.5 ML/M^2
BH CV ECHO MEAS - SUP REN AO DIAM: 2.1 CM
BH CV ECHO MEAS - SV(AO): 150.6 ML
BH CV ECHO MEAS - SV(CUBED): 63.9 ML
BH CV ECHO MEAS - SV(LVOT): 59.2 ML
BH CV ECHO MEAS - SV(MOD-SP4): 70 ML
BH CV ECHO MEAS - SV(RVOT): 39.5 ML
BH CV ECHO MEAS - SV(TEICH): 53.1 ML
BH CV ECHO MEAS - TAPSE (>1.6): 2.5 CM2
BH CV VAS BP RIGHT ARM: NORMAL MMHG
BH CV XLRA - RV BASE: 2.4 CM
BH CV XLRA - TDI S': 11 CM/SEC
BILIRUB SERPL-MCNC: 0.9 MG/DL (ref 0.1–1.2)
BUN BLD-MCNC: 25 MG/DL (ref 8–23)
BUN/CREAT SERPL: 26.9 (ref 7–25)
CALCIUM SPEC-SCNC: 8.5 MG/DL (ref 8.6–10.5)
CHLORIDE SERPL-SCNC: 102 MMOL/L (ref 98–107)
CO2 SERPL-SCNC: 20 MMOL/L (ref 22–29)
CREAT BLD-MCNC: 0.93 MG/DL (ref 0.57–1)
DACRYOCYTES BLD QL SMEAR: ABNORMAL
DEPRECATED RDW RBC AUTO: 48.1 FL (ref 37–54)
E/E' RATIO: 6
ERYTHROCYTE [DISTWIDTH] IN BLOOD BY AUTOMATED COUNT: 13.6 % (ref 11.7–13)
GFR SERPL CREATININE-BSD FRML MDRD: 61 ML/MIN/1.73
GLOBULIN UR ELPH-MCNC: 3.5 GM/DL
GLUCOSE BLD-MCNC: 205 MG/DL (ref 65–99)
GLUCOSE BLDC GLUCOMTR-MCNC: 179 MG/DL (ref 70–130)
GLUCOSE BLDC GLUCOMTR-MCNC: 192 MG/DL (ref 70–130)
GLUCOSE BLDC GLUCOMTR-MCNC: 201 MG/DL (ref 70–130)
GLUCOSE BLDC GLUCOMTR-MCNC: 208 MG/DL (ref 70–130)
GLUCOSE BLDC GLUCOMTR-MCNC: 254 MG/DL (ref 70–130)
HCT VFR BLD AUTO: 34.6 % (ref 35.6–45.5)
HGB BLD-MCNC: 11.5 G/DL (ref 11.9–15.5)
LEFT ATRIUM VOLUME INDEX: 16 ML/M2
LYMPHOCYTES # BLD MANUAL: 0.58 10*3/MM3 (ref 0.9–4.8)
LYMPHOCYTES NFR BLD MANUAL: 3 % (ref 5–12)
LYMPHOCYTES NFR BLD MANUAL: 9 % (ref 19.6–45.3)
MAXIMAL PREDICTED HEART RATE: 157 BPM
MCH RBC QN AUTO: 32 PG (ref 26.9–32)
MCHC RBC AUTO-ENTMCNC: 33.2 G/DL (ref 32.4–36.3)
MCV RBC AUTO: 96.4 FL (ref 80.5–98.2)
METAMYELOCYTES NFR BLD MANUAL: 1 % (ref 0–0)
MONOCYTES # BLD AUTO: 0.19 10*3/MM3 (ref 0.2–1.2)
NEUTROPHILS # BLD AUTO: 5.62 10*3/MM3 (ref 1.9–8.1)
NEUTROPHILS NFR BLD MANUAL: 87 % (ref 42.7–76)
PLAT MORPH BLD: NORMAL
PLATELET # BLD AUTO: 75 10*3/MM3 (ref 140–500)
PLATELET # BLD AUTO: 92 10*3/MM3 (ref 140–500)
PLATELETS.RETICULATED NFR BLD AUTO: 4.5 % (ref 0.9–6.5)
PMV BLD AUTO: 11.9 FL (ref 6–12)
POTASSIUM BLD-SCNC: 3.4 MMOL/L (ref 3.5–5.2)
PROT SERPL-MCNC: 6.5 G/DL (ref 6–8.5)
RBC # BLD AUTO: 3.59 10*6/MM3 (ref 3.9–5.2)
SCAN SLIDE: NORMAL
SODIUM BLD-SCNC: 139 MMOL/L (ref 136–145)
STRESS TARGET HR: 133 BPM
TROPONIN T SERPL-MCNC: <0.01 NG/ML (ref 0–0.03)
WBC MORPH BLD: NORMAL
WBC NRBC COR # BLD: 6.46 10*3/MM3 (ref 4.5–10.7)

## 2018-04-09 PROCEDURE — 63710000001 INSULIN DETEMER PER 5 UNITS: Performed by: INTERNAL MEDICINE

## 2018-04-09 PROCEDURE — 85055 RETICULATED PLATELET ASSAY: CPT | Performed by: INTERNAL MEDICINE

## 2018-04-09 PROCEDURE — 25010000002 PIPERACILLIN SOD-TAZOBACTAM PER 1 G: Performed by: INTERNAL MEDICINE

## 2018-04-09 PROCEDURE — 82962 GLUCOSE BLOOD TEST: CPT

## 2018-04-09 PROCEDURE — 85007 BL SMEAR W/DIFF WBC COUNT: CPT | Performed by: INTERNAL MEDICINE

## 2018-04-09 PROCEDURE — 84484 ASSAY OF TROPONIN QUANT: CPT | Performed by: INTERNAL MEDICINE

## 2018-04-09 PROCEDURE — 73718 MRI LOWER EXTREMITY W/O DYE: CPT

## 2018-04-09 PROCEDURE — 25010000002 ONDANSETRON PER 1 MG: Performed by: INTERNAL MEDICINE

## 2018-04-09 PROCEDURE — 99232 SBSQ HOSP IP/OBS MODERATE 35: CPT | Performed by: INTERNAL MEDICINE

## 2018-04-09 PROCEDURE — 63710000001 INSULIN ASPART PER 5 UNITS: Performed by: INTERNAL MEDICINE

## 2018-04-09 PROCEDURE — 87040 BLOOD CULTURE FOR BACTERIA: CPT | Performed by: INTERNAL MEDICINE

## 2018-04-09 PROCEDURE — 93010 ELECTROCARDIOGRAM REPORT: CPT | Performed by: INTERNAL MEDICINE

## 2018-04-09 PROCEDURE — 80053 COMPREHEN METABOLIC PANEL: CPT | Performed by: INTERNAL MEDICINE

## 2018-04-09 PROCEDURE — 63710000001 INSULIN REGULAR HUMAN PER 5 UNITS: Performed by: INTERNAL MEDICINE

## 2018-04-09 PROCEDURE — 93005 ELECTROCARDIOGRAM TRACING: CPT | Performed by: INTERNAL MEDICINE

## 2018-04-09 PROCEDURE — 25010000002 CEFTRIAXONE PER 250 MG: Performed by: INTERNAL MEDICINE

## 2018-04-09 PROCEDURE — 99232 SBSQ HOSP IP/OBS MODERATE 35: CPT | Performed by: PSYCHIATRY & NEUROLOGY

## 2018-04-09 PROCEDURE — 85025 COMPLETE CBC W/AUTO DIFF WBC: CPT | Performed by: INTERNAL MEDICINE

## 2018-04-09 PROCEDURE — 25010000003 POTASSIUM CHLORIDE 10 MEQ/100ML SOLUTION: Performed by: INTERNAL MEDICINE

## 2018-04-09 PROCEDURE — 99255 IP/OBS CONSLTJ NEW/EST HI 80: CPT | Performed by: INTERNAL MEDICINE

## 2018-04-09 RX ORDER — LEVOTHYROXINE SODIUM 137 UG/1
137 TABLET ORAL
Status: DISCONTINUED | OUTPATIENT
Start: 2018-04-09 | End: 2018-04-17 | Stop reason: HOSPADM

## 2018-04-09 RX ORDER — ONDANSETRON 2 MG/ML
4 INJECTION INTRAMUSCULAR; INTRAVENOUS EVERY 4 HOURS PRN
Status: DISCONTINUED | OUTPATIENT
Start: 2018-04-09 | End: 2018-04-17 | Stop reason: HOSPADM

## 2018-04-09 RX ORDER — SODIUM CHLORIDE 9 MG/ML
9 INJECTION, SOLUTION INTRAVENOUS CONTINUOUS
Status: DISCONTINUED | OUTPATIENT
Start: 2018-04-09 | End: 2018-04-17 | Stop reason: HOSPADM

## 2018-04-09 RX ORDER — GABAPENTIN 300 MG/1
600 CAPSULE ORAL EVERY 8 HOURS SCHEDULED
Status: DISCONTINUED | OUTPATIENT
Start: 2018-04-09 | End: 2018-04-17 | Stop reason: HOSPADM

## 2018-04-09 RX ORDER — POTASSIUM CHLORIDE 7.45 MG/ML
10 INJECTION INTRAVENOUS
Status: DISCONTINUED | OUTPATIENT
Start: 2018-04-09 | End: 2018-04-17 | Stop reason: HOSPADM

## 2018-04-09 RX ADMIN — SODIUM CHLORIDE 9 ML/HR: 9 INJECTION, SOLUTION INTRAVENOUS at 08:21

## 2018-04-09 RX ADMIN — POTASSIUM CHLORIDE 10 MEQ: 10 INJECTION, SOLUTION INTRAVENOUS at 05:41

## 2018-04-09 RX ADMIN — TAZOBACTAM SODIUM AND PIPERACILLIN SODIUM 3.38 G: 375; 3 INJECTION, SOLUTION INTRAVENOUS at 04:45

## 2018-04-09 RX ADMIN — GABAPENTIN 600 MG: 300 CAPSULE ORAL at 13:19

## 2018-04-09 RX ADMIN — INSULIN ASPART 3 UNITS: 100 INJECTION, SOLUTION INTRAVENOUS; SUBCUTANEOUS at 21:05

## 2018-04-09 RX ADMIN — INSULIN DETEMIR 25 UNITS: 100 INJECTION, SOLUTION SUBCUTANEOUS at 21:05

## 2018-04-09 RX ADMIN — ONDANSETRON 4 MG: 2 INJECTION INTRAMUSCULAR; INTRAVENOUS at 05:24

## 2018-04-09 RX ADMIN — GABAPENTIN 600 MG: 300 CAPSULE ORAL at 21:05

## 2018-04-09 RX ADMIN — POTASSIUM CHLORIDE 10 MEQ: 10 INJECTION, SOLUTION INTRAVENOUS at 11:57

## 2018-04-09 RX ADMIN — LEVOTHYROXINE SODIUM 137 MCG: 137 TABLET ORAL at 18:49

## 2018-04-09 RX ADMIN — POTASSIUM CHLORIDE 10 MEQ: 10 INJECTION, SOLUTION INTRAVENOUS at 14:58

## 2018-04-09 RX ADMIN — POTASSIUM CHLORIDE 10 MEQ: 10 INJECTION, SOLUTION INTRAVENOUS at 08:31

## 2018-04-09 RX ADMIN — HUMAN INSULIN 5 UNITS: 100 INJECTION, SOLUTION SUBCUTANEOUS at 04:42

## 2018-04-09 RX ADMIN — ONDANSETRON 4 MG: 2 INJECTION INTRAMUSCULAR; INTRAVENOUS at 14:50

## 2018-04-09 RX ADMIN — HUMAN INSULIN 5 UNITS: 100 INJECTION, SOLUTION SUBCUTANEOUS at 00:10

## 2018-04-09 RX ADMIN — HUMAN INSULIN 8 UNITS: 100 INJECTION, SOLUTION SUBCUTANEOUS at 11:42

## 2018-04-09 RX ADMIN — SODIUM CHLORIDE 2 G: 900 INJECTION INTRAVENOUS at 11:35

## 2018-04-09 RX ADMIN — HUMAN INSULIN 3 UNITS: 100 INJECTION, SOLUTION SUBCUTANEOUS at 08:20

## 2018-04-09 RX ADMIN — ONDANSETRON 4 MG: 2 INJECTION INTRAMUSCULAR; INTRAVENOUS at 09:58

## 2018-04-09 RX ADMIN — INSULIN ASPART 2 UNITS: 100 INJECTION, SOLUTION INTRAVENOUS; SUBCUTANEOUS at 17:46

## 2018-04-09 RX ADMIN — SODIUM CHLORIDE, POTASSIUM CHLORIDE, SODIUM LACTATE AND CALCIUM CHLORIDE 100 ML/HR: 600; 310; 30; 20 INJECTION, SOLUTION INTRAVENOUS at 08:28

## 2018-04-09 NOTE — PLAN OF CARE
Problem: Patient Care Overview  Goal: Plan of Care Review  Outcome: Ongoing (interventions implemented as appropriate)   04/09/18 0258   Coping/Psychosocial   Plan of Care Reviewed With patient;daughter   OTHER   Outcome Summary T max at was at 2000 102.9 rectal responded to tylenol began to speak her name at 2100 at 2300 [facility] reduced from 18 to 10 MRI showed nothing acute  STAT  echo was read as no vegetation pain intense with repositioning frustrated with hospital routine and frequent checks dr bob did not feel pt required cpap at this time on 4 liter sats in the 95-99 % sinus tach 100's        Problem: Skin Injury Risk (Adult)  Goal: Identify Related Risk Factors and Signs and Symptoms  Outcome: Ongoing (interventions implemented as appropriate)    Goal: Skin Health and Integrity  Outcome: Ongoing (interventions implemented as appropriate)      Problem: Fall Risk (Adult)  Goal: Identify Related Risk Factors and Signs and Symptoms  Outcome: Ongoing (interventions implemented as appropriate)    Goal: Absence of Fall  Outcome: Ongoing (interventions implemented as appropriate)      Problem: Sepsis/Septic Shock (Adult)  Goal: Signs and Symptoms of Listed Potential Problems Will be Absent, Minimized or Managed (Sepsis/Septic Shock)  Outcome: Ongoing (interventions implemented as appropriate)      Problem: Pain, Acute (Adult)  Goal: Identify Related Risk Factors and Signs and Symptoms  Outcome: Ongoing (interventions implemented as appropriate)    Goal: Acceptable Pain Control/Comfort Level  Outcome: Ongoing (interventions implemented as appropriate)      Problem: Confusion, Acute (Adult)  Goal: Identify Related Risk Factors and Signs and Symptoms  Outcome: Ongoing (interventions implemented as appropriate)    Goal: Cognitive/Functional Impairments Minimized  Outcome: Ongoing (interventions implemented as appropriate)

## 2018-04-09 NOTE — PROGRESS NOTES
PROGRESS NOTE  Patient Name: Elisabet Berry  Age/Sex: 62 y.o. female  : 1955  MRN: [id number]    Date of Admission: 2018  Date of Encounter Visit: 18   LOS: 2 days   Patient Care Team:  Christine Phelps DO as PCP - General (Family Medicine)    Chief Complaint: Syncope and pain    Interval History: Patient came in with syncope was no evidence of stroke on her workup, she does have cellulitis of her left lower extremity, she is on antibiotic and followed by infectious disease and she is being evaluated by vascular surgery.  She denies any respiratory complaints, she is on room air was no shortness of breath or cough.  She is complaining of shoulder pain and leg pain and she was on Neurontin at home which need to be resumed.  Her antibiotic was Rocephin for the time being per infectious disease for treatment of non-A/B Streptococcus.  Her blood pressure is doing pretty well, no pressors needed, making urine, she was slightly lethargic after her pain medication with the narcotic and might do better by putting her back on her Neurontin.  No confusion, she is oriented ×4 and very pleasant lady.    REVIEW OF SYSTEMS:   CARDIOVASCULAR: No chest pain, chest pressure or chest discomfort. No palpitations , positive edema.   RESPIRATORY: No shortness of breath, cough or sputum.   GASTROINTESTINAL: No anorexia, nausea, vomiting or diarrhea. No abdominal pain or blood.   HEMATOLOGIC: No bleeding or bruising.     Ventilator/Non-Invasive Ventilation Settings     None            Vital Signs  Temp:  [99.3 °F (37.4 °C)-101.1 °F (38.4 °C)] 99.3 °F (37.4 °C)  Heart Rate:  [] 86  Resp:  [16-24] 24  BP: ()/(49-82) 127/63  SpO2:  [90 %-99 %] 96 %  on  Flow (L/min):  [4] 4 Device (Oxygen Therapy): room air    Intake/Output Summary (Last 24 hours) at 18 1311  Last data filed at 18 0831   Gross per 24 hour   Intake           6420.4 ml   Output              200 ml   Net           6220.4 ml  "    Flowsheet Rows    Flowsheet Row First Filed Value   Admission Height 167.6 cm (66\") Documented at 04/07/2018 1854   Admission Weight 102 kg (225 lb) Documented at 04/07/2018 1854        Body mass index is 37.08 kg/m².  1    04/07/18  1854 04/08/18  0100 04/09/18  0851   Weight: 102 kg (225 lb) 107 kg (236 lb 12.4 oz) 107 kg (236 lb 12.4 oz)       Physical Exam:  GEN:  No acute distress, alert, cooperative, well developed , in pain  EYES:   Sclera clear. No icterus. PERRL. Normal EOM  ENT:   External ears/nose normal, no oral lesions, no thrush, mucous membranes moist  NECK:  Supple, midline trachea, no JVD  LUNGS: Normal chest on inspection, CTAB, no wheezes. No rhonchi. No crackles. Respirations regular, even and unlabored.   CV:  Regular rhythm and rate. Normal S1/S2. No murmurs, gallops, or rubs noted.  ABD:  Soft, non-tender and non-distended. Normal bowel sounds. No guarding  EXT:  Moves all extremities well. No cyanosis. No redness.  Positive left lower extremity asymmetric edema.   Skin: Cellulitis around the second and the third toe on the left with some swelling and edema of that right lower extremity, no bleeding, she does have an ulcer at the tip of the toe    Results Review:        Results from last 7 days  Lab Units 04/09/18  0402 04/08/18  0059 04/07/18  1858   SODIUM mmol/L 139 139 130*   POTASSIUM mmol/L 3.4* 3.6 5.8*   CHLORIDE mmol/L 102 105 91*   CO2 mmol/L 20.0* 17.1* 24.2   BUN mg/dL 25* 22 30*   CREATININE mg/dL 0.93 0.92 1.30*   CALCIUM mg/dL 8.5* 6.9* 9.7   AST (SGOT) U/L 44* 30 48*   ALT (SGPT) U/L 42* 39* 63*   ANION GAP mmol/L 17.0 16.9 14.8   ALBUMIN g/dL 3.00* 2.80* 4.30       Results from last 7 days  Lab Units 04/09/18  0402 04/08/18  1604 04/08/18  1051  04/07/18  1858   CK TOTAL U/L  --   --   --   --  62   TROPONIN T ng/mL <0.010 <0.010 <0.010  < > <0.010   < > = values in this interval not displayed.            Results from last 7 days  Lab Units 04/09/18  1158 04/09/18  0402 " 04/08/18  0059 04/07/18  1858   WBC 10*3/mm3  --  6.46 9.46 8.38   HEMOGLOBIN g/dL  --  11.5* 11.2* 14.5   HEMATOCRIT %  --  34.6* 34.9* 42.6   PLATELETS 10*3/mm3 75* 92* 86* 104*   MCV fL  --  96.4 99.4* 96.2   NEUTROPHIL % %  --   --  88.5* 89.2*   LYMPHOCYTE % %  --   --  7.1* 6.9*   MONOCYTES % %  --   --  4.0* 3.5*   EOSINOPHIL % %  --   --  0.0* 0.1*   BASOPHIL % %  --   --  0.1 0.1   IMM GRAN % %  --   --  0.3 0.2           Results from last 7 days  Lab Units 04/07/18  1858   MAGNESIUM mg/dL 1.8           Invalid input(s): LDLCALC    Results from last 7 days  Lab Units 04/08/18  1606 04/08/18  0015   PH, ARTERIAL pH units 7.469* 7.453*   PCO2, ARTERIAL mm Hg 22.9* 26.6*   PO2 ART mm Hg 71.2* 75.1*   HCO3 ART mmol/L 16.6* 18.6*       Results from last 7 days  Lab Units 04/08/18  0059   HEMOGLOBIN A1C % 10.80*     Glucose   Date/Time Value Ref Range Status   04/09/2018 1123 254 (H) 70 - 130 mg/dL Final   04/09/2018 0717 192 (H) 70 - 130 mg/dL Final   04/09/2018 0250 201 (H) 70 - 130 mg/dL Final   04/08/2018 2319 209 (H) 70 - 130 mg/dL Final   04/08/2018 1925 235 (H) 70 - 130 mg/dL Final   04/08/2018 1515 298 (H) 70 - 130 mg/dL Final   04/08/2018 1120 256 (H) 70 - 130 mg/dL Final   04/08/2018 0629 255 (H) 70 - 130 mg/dL Final       Results from last 7 days  Lab Units 04/08/18  1604 04/08/18  0216 04/07/18  2214 04/07/18  1858   PROCALCITONIN ng/mL  --   --   --  1.08*   LACTATE mmol/L 1.5 2.4* 3.6*  --        Results from last 7 days  Lab Units 04/07/18  2250 04/07/18  2213   BLOODCX    Streptococcus, Beta Hemolytic, Group C* No growth at 24 hours   GRAM STAIN RESULT  Anaerobic Bottle Gram positive cocci in chains  Aerobic Bottle Gram positive cocci in chains  --    BCIDPCR  Streptococcus spp, not A, B, or pneumoniae. Identification by BCID PCR.*  --        Results from last 7 days  Lab Units 04/07/18  1942   NITRITE UA  Negative       Results from last 7 days  Lab Units 04/07/18  2217   ADENOVIRUS DETECTION  BY PCR  Not Detected   CORONAVIRUS 229E  Not Detected   CORONAVIRUS HKU1  Not Detected   CORONAVIRUS NL63  Not Detected   CORONAVIRUS OC43  Not Detected   HUMAN METAPNEUMOVIRUS  Not Detected   HUMAN RHINOVIRUS/ENTEROVIRUS  Not Detected   INFLUENZA B PCR  Not Detected   PARAINFLUENZA 1  Not Detected   PARAINFLUENZA VIRUS 2  Not Detected   PARAINFLUENZA VIRUS 3  Not Detected   PARAINFLUENZA VIRUS 4  Not Detected   BORDETELLA PERTUSSIS PCR  Not Detected   CHLAMYDOPHILA PNEUMONIAE PCR  Not Detected   MYCOPLAMA PNEUMO PCR  Not Detected   INFLUENZA A PCR  Not Detected   INFLUENZA A H3  Not Detected   INFLUENZA A H1  Not Detected   RSV, PCR  Not Detected       Results from last 7 days  Lab Units 04/08/18  1604   URIC ACID mg/dL 3.1       Imaging:   Imaging Results (all)     Procedure Component Value Units Date/Time    MRI Foot Left With & Without Contrast [[id number]] Collected:  04/09/18 1011     Updated:  04/09/18 1011    Narrative:       LEFT MID AND FOREFOOT MRI WITH AND WITHOUT CONTRAST     HISTORY: Sepsis with cellulitis and swelling at the 3rd toe.     TECHNIQUE: MRI of the left mid and forefoot was performed with and  without IV contrast. There is no previous exam for comparison.     FINDINGS: Marrow signal throughout the mid and forefoot is normal.  Specifically, there is no marrow signal abnormality of the 3rd toe.  There is soft tissue edema throughout the mid and forefoot with soft  tissue enhancement along the 3rd toe. No fluid collection is observed at  the 3rd toe. The interphalangeal joints and the metatarsophalangeal  joints demonstrate normal amount of fluid and no abnormal contrast  enhancement.     There is some degenerative change at the 1st and 2nd tarsometatarsal  joints with a small volume of fluid around the plantar edge of the 1st  tarsometatarsal joint. There is also a small volume of fluid in the  flexor hallucis longus tendon sheath at the same level. Some soft tissue  edema and enhancement is  observed along the medial plantar surface of  the foot in the same region as the small fluid collections. There is no  marrow signal abnormality around the 1st metatarsophalangeal joint,  however. No other fluid collection is identified in the mid or forefoot.  The flexor and extensor tendons appear intact.       Impression:       Cellulitis at the 3rd toe without evidence of osteomyelitis  or septic arthritis.     There is some fluid in the flexor hallucis longus tendon sheath and  plantar to the 1st metatarsophalangeal joint, favored to be arising from  that joint, with some soft tissue enhancement along the plantar surface  of the foot in the same region. Given the clinical scenario, it is  possible that these changes are infectious. There is no evidence of  osteomyelitis around the visualized midfoot. The entire hindfoot is not  included in this exam.             I reviewed the patient's new clinical results.  I personally viewed and interpreted the patient's imaging results:Chest x-ray showed no acute disease, MRI films findings per radiologist        Medication Review:     ceftriaxone 2 g Intravenous Q24H   gabapentin 600 mg Oral Q8H   insulin regular 0-14 Units Subcutaneous Q4H         sodium chloride 9 mL/hr Last Rate: 9 mL/hr (04/09/18 0821)       ASSESSMENT:   1. Sepsis with streptococcal C infection with left foot cellulitis  2. Diabetic left foot ulcer  3. Uncontrolled diabetes mellitus  4. Syncope, negative cardiac/neurology workup  5. History of gout  6. Lactic acidosis on presentation  7. Thrombocytopenia    PLAN:  Vascular surgery and infectious disease on board for the foot ulcer evaluation and treatment recommendation  Patient needs to have a regimen that she can follow regularly as an outpatient that is affordable given the fact that most of the problem seems to be  affordability issues with the medications.  Will ask endocrinology to help with that  Patient needs to be on a better pain control  regimen that does not cause sedation and she will be resumed on her Neurontin and keep when necessary narcotic is needed  Discussed with patient and with the staff  IPF level was checked after the morning labs results were noted and it came back normal at 4.5%, we need to monitor off the anticoagulation and consider further measures if the platelet count continues to decline  In-depth education was provided about the impact of uncontrolled diabetes on the long-term in the fact that she cannot rely on samples especially if there are frequent interval of discontinuation in the medication as a result of that  Continue antibiotic per infectious disease while awaiting further recommendation from vascular surgery if any surgical intervention is necessary    Disposition: Transfer to a stepdown unit    Jerry Russo MD  04/09/18  1:11 PM            Dictated utilizing Dragon dictation:  Much of this encounter note is an electronic transcription/translation of spoken language to printed text. The electronic translation of spoken language may permit erroneous, or at times, nonsensical words or phrases to be inadvertently transcribed; Although I have reviewed the note for such errors, some may still exist.

## 2018-04-09 NOTE — NURSING NOTE
Homar from ICU here to evaluate change in stroke scale echo in progress will return to evaluate when test over

## 2018-04-09 NOTE — PROGRESS NOTES
Discharge Planning Assessment  [facility]     Patient Name: Elisabet Berry  MRN: [id number]  Today's Date: 4/9/2018    Admit Date: 4/7/2018          Discharge Needs Assessment     Row Name 04/09/18 1139       Living Environment    Lives With spouse    Current Living Arrangements home/apartment/condo    Primary Care Provided by self    Provides Primary Care For no one    Family Caregiver if Needed spouse;child(rishi), adult    Quality of Family Relationships supportive    Able to Return to Prior Arrangements yes       Resource/Environmental Concerns    Resource/Environmental Concerns financial    Financial Concerns insurance, inadequate;medicine, unable to afford    Transportation Concerns car, none       Transition Planning    Patient/Family Anticipates Transition to home with family    Patient/Family Anticipated Services at Transition none    Transportation Anticipated family or friend will provide       Discharge Needs Assessment    Readmission Within the Last 30 Days no previous admission in last 30 days    Concerns to be Addressed discharge planning;medication    Equipment Currently Used at Home glucometer    Anticipated Changes Related to Illness none    Equipment Needed After Discharge none    Discharge Facility/Level of Care Needs home with home health    Offered/Gave Vendor List yes    Current Discharge Risk dependent with mobility/activities of daily living            Discharge Plan     Row Name 04/09/18 1141       Plan    Plan Home with family support    Patient/Family in Agreement with Plan yes    Plan Comments Spoke with pt and her daughter Lovely at bedside.  Introduced self and explained role.  CCP contact information provided.  Face sheet and pharmacy corrected.  Pt's PCP is Dr. Christine Phelps in [facility].  Pt states that she is covered under her 's insurance policy but according to [facility] , [place], when she calls the provider they state the policy covers pt's  only.  Pt  and family will call insurance to correct the problem.  CCP was also alerted that pt cannot afford her diabetes medication.  She gets her medication through her PCP and [facility] in [place].  CCP called the pharmacy and they state that pt's copay was $353.60 for Toujeo.  Pt was not able to afford the medication with a manufacurer coupon either.  Pt had also been on Victoza but was unable to afford that as well.  Pt's PCP had been supplying the pt with samples but those ran out.  Endocrinology will see pt at [facility] and CCP asked RN to pass along the need for cost effective medication regimine.  Once this is established and prescriptions are written, CCP will check the cost through the [facility] retail pharmacy and will communicate with pt's PCP.  Pt's  works night shift and is therefore not available at this time.  The diabetic educator has also been consulted.  Plan at this time is home with family support.        Destination     No service coordination in this encounter.      Durable Medical Equipment     No service coordination in this encounter.      Dialysis/Infusion     No service coordination in this encounter.      Home Medical Care     No service coordination in this encounter.      Social Care     No service coordination in this encounter.                Demographic Summary     Row Name 04/09/18 1138       General Information    Admission Type inpatient    Arrived From home    Referral Source admission list    Reason for Consult discharge planning;other (see comments)   medication cost    Preferred Language English     Used During This Interaction no       Contact Information    Permission Granted to Share Info With family/designee            Functional Status     Row Name 04/09/18 1138       Functional Status    Usual Activity Tolerance good    Current Activity Tolerance fair       Functional Status, IADL    Medications independent    Meal Preparation independent    Housekeeping  independent    Laundry independent    Shopping independent       Mental Status    General Appearance WDL WDL       Mental Status Summary    Recent Changes in Mental Status/Cognitive Functioning no changes            Psychosocial    No documentation.           Abuse/Neglect    No documentation.           Legal    No documentation.           Substance Abuse    No documentation.           Patient Forms    No documentation.         Ashley Zaidi RN

## 2018-04-09 NOTE — NURSING NOTE
Pt able to read all words and sentences on [facility]  Able to state correct answers to orientation questions. Remains weak and generalized pain response to mild to moderate  stimulation

## 2018-04-09 NOTE — CONSULTS
[facility] Consult    Patient Name: Elisabet Berry  Age/Sex: 62 y.o. female  : 1955  MRN: [id number]    Date of Admission: 2018  Date of Encounter Visit: 18  Encounter Provider: Harpreet Dorantes MD  Referring Provider: Carlos Eduardo Mcknight MD  Place of Service: [facility] CARDIOLOGY  Patient Care Team:  No Known Provider as PCP - General    Subjective:     Consulted for: tachycardia, assess for endocarditis    Chief Complaint: Cellulitis with Gram + bacteremia and encephalopathy    History of Present Illness:  Elisabet Berry is a 62 y.o. female with history of diabetes.  She has a history of to ulcers previously.  She had a fall and some depressed mental status.    She is now toxic-appearing with fever and Strep bacteremia.  A stat echocardiogram was performed that was of limited quality.  No clear vegetation was seen.    She does not have specific cardiac complaints.      Past Medical History:  Past Medical History:   Diagnosis Date   • Arthritis    • Diabetes mellitus    • Gout    • Peripheral neuropathy        No past surgical history on file.    Home Medications:   Prescriptions Prior to Admission   Medication Sig Dispense Refill Last Dose   • ALLOPURINOL PO Take  by mouth. Pt unable to verify rate and dose at this time.      • cyclobenzaprine (FLEXERIL) 10 MG tablet Take 10 mg by mouth Every 8 (Eight) Hours As Needed for Muscle Spasms.      • gabapentin (NEURONTIN) 600 MG tablet Take 600 mg by mouth 3 (Three) Times a Day.      • traZODone (DESYREL) 50 MG tablet Take 50 mg by mouth Every Night.          Allergies:  No Known Allergies    Past Social History:  Social History     Social History   • Marital status:      Social History Main Topics   • Smoking status: Never Smoker   • Drug use: Unknown     Other Topics Concern   • Not on file       Past Family History:  No family history on file.    Review of Systems:   All systems reviewed. Pertinent positives  identified in HPI. All other systems are negative.    Objective:   Temp:  [98.9 °F (37.2 °C)-103.6 °F (39.8 °C)] 99.6 °F (37.6 °C)  Heart Rate:  [109-134] 109  Resp:  [14-18] 18  BP: (107-158)/(50-90) 113/72     Intake/Output Summary (Last 24 hours) at 04/08/18 2209  Last data filed at 04/08/18 0100   Gross per 24 hour   Intake             2000 ml   Output              250 ml   Net             1750 ml     Body mass index is 37.08 kg/m².  1    04/07/18  1854 04/08/18  0100   Weight: 102 kg (225 lb) 107 kg (236 lb 12.4 oz)     Weight change:     Physical Exam:   Toxic appearing, uncomfortable  HEENT:  Laceration, pupils normal  Lungs clear  Cardiac tachy, regular  Abd:  Obese, non-tender  Ext:  No edema.  Inflammation and pus in L toe  Neuro:  Lethargic      Lab Review:     Results from last 7 days  Lab Units 04/08/18 0059 04/07/18  1858   SODIUM mmol/L 139 130*   POTASSIUM mmol/L 3.6 5.8*   CHLORIDE mmol/L 105 91*   CO2 mmol/L 17.1* 24.2   BUN mg/dL 22 30*   CREATININE mg/dL 0.92 1.30*   GLUCOSE mg/dL 316* 437*   CALCIUM mg/dL 6.9* 9.7   AST (SGOT) U/L 30 48*   ALT (SGPT) U/L 39* 63*       Results from last 7 days  Lab Units 04/08/18  1604 04/08/18  1051 04/08/18  0446 04/08/18  0059 04/07/18  1858   CK TOTAL U/L  --   --   --   --  62   TROPONIN T ng/mL <0.010 <0.010 <0.010 <0.010 <0.010       Results from last 7 days  Lab Units 04/08/18  0059 04/07/18  1858   WBC 10*3/mm3 9.46 8.38   HEMOGLOBIN g/dL 11.2* 14.5   HEMATOCRIT % 34.9* 42.6   PLATELETS 10*3/mm3 86* 104*           Results from last 7 days  Lab Units 04/07/18  1858   MAGNESIUM mg/dL 1.8       Imaging:  Imaging Results (most recent)     Procedure Component Value Units Date/Time    XR Shoulder 2+ View Right [[id number]] Collected:  04/08/18 1634     Updated:  04/08/18 1946    Narrative:       RIGHT SHOULDER 2 VIEWS 04/08/2018      HISTORY: Right shoulder pain.     FINDINGS: There is some minimal hypertrophic change of the inferior  glenoid. No fractures or  dislocations are seen. AC joint appears intact.       Impression:       1. No acute process.  2. Minimal hypertrophic change in the inferior glenoid.     This report was finalized on 4/8/2018 7:43 PM by Dr. Tim Gunderson MD.       MRI Brain Without Contrast [[id number]] Collected:  04/08/18 1933     Updated:  04/08/18 1933    Narrative:       BRAIN MRI WITHOUT GADOLINIUM     HISTORY: Confusion/delirium, altered LOC, unexplained.     COMPARISON: None.     FINDINGS:  Multiplanar images of the head were obtained without the use  of intravenous contrast.     There is a mild degree of generalized atrophy. There are a few scattered  foci of increased T2 and FLAIR signal abnormality in the white matter  bilaterally most likely sequelae of mild/early ischemic gliotic change.  No foci of restricted diffusion are demonstrated on diffusion weighted  images (DWI) to suggest acute ischemia. Remainder of the brain  parenchyma is unremarkable. The ventricular system is normal in caliber  and configuration for age. Midline structures corpus callosum, pituitary  gland, and brainstem are unremarkable. There is no evidence of extra  axial mass or fluid collection. The major vascular flow voids are  patent. The orbital and encompassed paranasal soft tissues are  unremarkable. Osseous marrow signal intensity is within normal limits.     Preliminary interpretation telephoned directly to Dr. Marquez during  interpretation at 7:19 PM.                Impression:       1. Very mild atrophy and chronic appearing ischemic gliotic changes, no  acute intracranial finding          MRI Foot Left With & Without Contrast [[id number]] Updated:  04/08/18 1917    CT Abdomen Pelvis Without Contrast [[id number]] Collected:  04/07/18 2021     Updated:  04/07/18 2028    Narrative:       NONCONTRAST CT SCANS ABDOMEN AND PELVIS     HISTORY: ab pain     COMPARISON: None.     TECHNIQUE:Radiation dose reduction techniques were utilized, including  automated  exposure control and exposure modulation based on body size.   Axial images were obtained from the lung bases to the symphysis pubis  without oral or IV contrast per request.      FINDINGS ABDOMEN CT:  There is motion and body habitus related artifact.  Within these limitations, the solid organs appear grossly unremarkable.  Aorta nonaneurysmal. No ascites. Numerous colonic diverticuli, normal  appendix.     FINDINGS PELVIS CT:  The GI tract not opacified for assessment but non  obstructive in appearance. Abundant fecal material suggests  constipation. Bone windows demonstrate nondisplaced fractures of the  right L1 and L2 transverse processes.                Impression:       1.  Colonic diverticulosis, constipation.  2. Nondisplaced right transverse process fractures of L1 and L2.  3. Grossly unremarkable noncontrast exam otherwise considering artifact.     This report was finalized on 4/7/2018 8:25 PM by Gian Vance MD.       CT Cervical Spine Without Contrast [[id number]] Collected:  04/07/18 2017     Updated:  04/07/18 2024    Narrative:       NONCONTRAST CT SCAN CERVICAL SPINE     CLINICAL HISTORY: head injury     COMPARISON: None.     TECHNIQUE: Radiation dose reduction techniques were utilized, including  automated exposure control and exposure modulation based on body size.  Axial noncontrast images of the cervical spine were obtained without  contrast. Sagittal reformatted images were supplemented.     FINDINGS:  No acute vertebral fracture identified on the axial series.  There are extensive postsurgical changes. Partial corpectomy defect of  the C4 vertebra is noted along the more central and right lateral  aspect. A metallic plate traverses the C3-C6 vertebrae anteriorly with  bilateral fixation screws at C3, C5, and C6. There is some lucency about  both of the C3 fixation screw suggesting loosening. The C5 and C6  fixation screws appear to be well secured.     The vertebrae are well aligned and well  maintained in height and stature  on the sagittal reformatted images.  No significant compression  deformity or retropulsion.     Degenerative changes are present. These are most pronounced in the mid  and lower cervical spine, particularly at C6-7 followed by C5-6.  Marginal osteophytes contribute to multilevel canal and foraminal  narrowing, again mostly in the mid and lower cervical levels. The facets  are well aligned. Mild multilevel facet arthropathy is present.                      Impression:       1. Postsurgical and degenerative changes as discussed, no acute osseous  finding     This report was finalized on 4/7/2018 8:20 PM by Gian Vance MD.       CT Head Without Contrast [[id number]] Collected:  04/07/18 2016     Updated:  04/07/18 2020    Narrative:       CRANIAL CT SCAN WITHOUT CONTRAST     CLINICAL HISTORY: head injury     COMPARISON: None.     TECHNIQUE: Radiation dose reduction techniques were utilized, including  automated exposure control and exposure modulation based on body size.  Multiple axial images of the head were obtained without contrast.      FINDINGS:  There are no abnormal areas of increased density or mass  effect.      Ventricles, sulci, and cisterns appear normal. Bone window  images are unremarkable.                Impression:       1. No acute intracranial abnormality.                     This study was performed with techniques to keep radiation doses as low  as reasonably achievable (ALARA). Individualized dose reduction  techniques using automated exposure control or adjustment of mA and/or  kV according to the patient size were employed.      This report was finalized on 4/7/2018 8:17 PM by Gian Vance MD.       XR Chest 1 View [[id number]] Collected:  04/07/18 2003     Updated:  04/07/18 2007    Narrative:       PORTABLE CHEST X-RAY     HISTORY: short of breath     COMPARISON: None.     FINDINGS: Portable AP view of the chest was obtained. Lungs are under  aerated but  grossly  clear where visualized. Cardiac margins largely  obscured. Vascularity felt to be normal considering poor inspiration.    No obvious significant pleural fluid collections.              Impression:          Expiratory radiograph without gross active air space disease process     This report was finalized on 4/7/2018 8:04 PM by Gian Vance MD.             I personally viewed and interpreted the patient's EKG    Assessment:     Active Problems:    Generalized weakness        Plan:     The patient is a 62 year old woman with history of diabetes and foot ulcer.  She presents with cellulitis that has progressed to Gram + bacteremia.  She developed sepsis symptoms with acute mental status change due to encephalopathy.    MRI has been negative for stroke.  She had an echocardiogram that was of limited quality.  There is no clear evidence for vegetation.  There is a questionable abnormality in the RA/tricuspid area, but I would not call it clearly a vegetation.    I would treat with antibiotics, pressors and fluids as needed.  If we have to rule out abscess definitively, she'll require a JAYE.    Thank you for allowing me to participate in the care of Elisabet Berry. Feel free to contact me directly with any further questions or concerns.    Harpreet Dorantes MD  [facility]  04/08/18  10:09 PM

## 2018-04-09 NOTE — PROGRESS NOTES
Patient Identification:  NAME:  Elisabet Berry  Age:  62 y.o.   Sex:  female   :  1955   MRN:  [id number]       Chief complaint: Generalized weakness, metabolic encephalopathy sepsis    History of present illness:  He looks great today she's feeling good denies any real neurologic complaints at all certainly no paresthesias paralysis or confusion and by my independent eyeball review is normal    Past medical history:  Past Medical History:   Diagnosis Date   • Arthritis    • Diabetes mellitus    • Gout    • Peripheral neuropathy        Allergies:  Colchicine; Erythromycin; Sulfa antibiotics; and Tramadol    Home medications:  Prescriptions Prior to Admission   Medication Sig Dispense Refill Last Dose   • ALLOPURINOL PO Take  by mouth. Pt unable to verify rate and dose at this time.      • cyclobenzaprine (FLEXERIL) 10 MG tablet Take 10 mg by mouth Every 8 (Eight) Hours As Needed for Muscle Spasms.      • gabapentin (NEURONTIN) 600 MG tablet Take 600 mg by mouth 3 (Three) Times a Day.      • traZODone (DESYREL) 50 MG tablet Take 50 mg by mouth Every Night.           Hospital medications:    ceftriaxone 2 g Intravenous Q24H   gabapentin 600 mg Oral Q8H   insulin regular 0-14 Units Subcutaneous Q4H       sodium chloride 9 mL/hr Last Rate: 9 mL/hr (18 0821)     •  acetaminophen  •  dextrose  •  dextrose  •  glucagon (human recombinant)  •  Morphine  •  Morphine  •  ondansetron  •  potassium chloride  •  sodium chloride  •  sodium chloride      Objective:  Vitals Ranges:   Temp:  [99.3 °F (37.4 °C)-101.1 °F (38.4 °C)] 99.3 °F (37.4 °C)  Heart Rate:  [] 86  Resp:  [16-24] 24  BP: ()/(49-78) 127/63      Physical Exam:  Fairy awake and alert talkative very pleasant normal cranial nerves II through VII tongue is midline good motor times for extremities reflexes 1+ symmetrical toes downgoing bilaterally    Results review:   I reviewed the patient's new clinical results.    Data review:  Lab Results  (last 24 hours)     Procedure Component Value Units Date/Time    Immature Platelet Fraction [[id number]]  (Abnormal) Collected:  04/09/18 1158    Specimen:  Blood Updated:  04/09/18 1214     IPF 4.50 %      Platelets 75 (L) 10*3/mm3     POC Glucose Once [[id number]]  (Abnormal) Collected:  04/09/18 1123    Specimen:  Blood Updated:  04/09/18 1126     Glucose 254 (H) mg/dL     Narrative:       Meter: [id number] : [id number] Martin ZAMORA    Blood Culture - Blood, [[id number]] Collected:  04/09/18 0930    Specimen:  Blood from Arm, Left Updated:  04/09/18 0944    Blood Culture - Blood, [[id number]]  (Abnormal) Collected:  04/07/18 2250    Specimen:  Blood from Arm, Left Updated:  04/09/18 0932     Blood Culture --      Streptococcus, Beta Hemolytic, Group C (A)     Isolated from Aerobic and Anaerobic Bottles     STREP GROUPING C     Gram Stain Result Anaerobic Bottle Gram positive cocci in chains      Aerobic Bottle Gram positive cocci in chains    Blood Culture - Blood, [[id number]] Collected:  04/09/18 0848    Specimen:  Blood from Hand, Left Updated:  04/09/18 0920    POC Glucose Once [[id number]]  (Abnormal) Collected:  04/09/18 0717    Specimen:  Blood Updated:  04/09/18 0719     Glucose 192 (H) mg/dL     Narrative:       Meter: [id number] : [id number] Martin ZAMORA    CBC & Differential [[id number]] Collected:  04/09/18 0402    Specimen:  Blood Updated:  04/09/18 0506    Narrative:       The following orders were created for panel order CBC & Differential.  Procedure                               Abnormality         Status                     ---------                               -----------         ------                     Manual Differential[[id number]]          Abnormal            Final result               Scan Slide[[id number]]                                       Final result               CBC Auto Differential[[id number]]        Abnormal            Final result                 Please view results  for these tests on the individual orders.    CBC Auto Differential [[id number]]  (Abnormal) Collected:  04/09/18 0402    Specimen:  Blood Updated:  04/09/18 0506     WBC 6.46 10*3/mm3      RBC 3.59 (L) 10*6/mm3      Hemoglobin 11.5 (L) g/dL      Hematocrit 34.6 (L) %      MCV 96.4 fL      MCH 32.0 pg      MCHC 33.2 g/dL      RDW 13.6 (H) %      RDW-SD 48.1 fl      MPV 11.9 fL      Platelets 92 (L) 10*3/mm3     Scan Slide [[id number]] Collected:  04/09/18 0402    Specimen:  Blood Updated:  04/09/18 0506     Scan Slide --     Comment: See Manual Differential Results       Manual Differential [[id number]]  (Abnormal) Collected:  04/09/18 0402    Specimen:  Blood Updated:  04/09/18 0506     Neutrophil % 87.0 (H) %      Comment: 1+ bands noted        Lymphocyte % 9.0 (L) %      Monocyte % 3.0 (L) %      Metamyelocyte % 1.0 (H) %      Neutrophils Absolute 5.62 10*3/mm3      Lymphocytes Absolute 0.58 (L) 10*3/mm3      Monocytes Absolute 0.19 (L) 10*3/mm3      Dacrocytes Slight/1+     WBC Morphology Normal     Platelet Morphology Normal    Comprehensive Metabolic Panel [[id number]]  (Abnormal) Collected:  04/09/18 0402    Specimen:  Blood Updated:  04/09/18 0506     Glucose 205 (H) mg/dL      BUN 25 (H) mg/dL      Creatinine 0.93 mg/dL      Sodium 139 mmol/L      Potassium 3.4 (L) mmol/L      Chloride 102 mmol/L      CO2 20.0 (L) mmol/L      Calcium 8.5 (L) mg/dL      Total Protein 6.5 g/dL      Albumin 3.00 (L) g/dL      ALT (SGPT) 42 (H) U/L      AST (SGOT) 44 (H) U/L      Alkaline Phosphatase 65 U/L      Total Bilirubin 0.9 mg/dL      eGFR Non African Amer 61 mL/min/1.73      Globulin 3.5 gm/dL      A/G Ratio 0.9 g/dL      BUN/Creatinine Ratio 26.9 (H)     Anion Gap 17.0 mmol/L     Troponin [[id number]]  (Normal) Collected:  04/09/18 0402    Specimen:  Blood Updated:  04/09/18 0506     Troponin T <0.010 ng/mL     Narrative:       Troponin T Reference Ranges:  Less than 0.03 ng/mL:    Negative for AMI  0.03 to 0.09 ng/mL:       Indeterminant for AMI  Greater than 0.09 ng/mL: Positive for AMI    POC Glucose Once [[id number]]  (Abnormal) Collected:  04/09/18 0250    Specimen:  Blood Updated:  04/09/18 0251     Glucose 201 (H) mg/dL     Narrative:       Meter: [id number] : [id number] [facility]    POC Glucose Once [[id number]]  (Abnormal) Collected:  04/08/18 2319    Specimen:  Blood Updated:  04/08/18 2320     Glucose 209 (H) mg/dL     Narrative:       Meter: [id number] : [id number] [facility]    Blood Culture - Blood, [[id number]]  (Normal) Collected:  04/07/18 2213    Specimen:  Blood from Hand, Left Updated:  04/08/18 2231     Blood Culture No growth at 24 hours    POC Glucose Once [[id number]]  (Abnormal) Collected:  04/08/18 1925    Specimen:  Blood Updated:  04/08/18 1926     Glucose 235 (H) mg/dL     Narrative:       Meter: [id number] : [id number] Linda LLANES RN    Blood Culture ID, PCR - Blood, [[id number]]  (Abnormal) Collected:  04/07/18 2250    Specimen:  Blood from Arm, Left Updated:  04/08/18 1800     BCID, PCR Streptococcus spp, not A, B, or pneumoniae. Identification by BCID PCR. (C)    Hemoglobin A1c [[id number]]  (Abnormal) Collected:  04/08/18 0059    Specimen:  Blood Updated:  04/08/18 1705     Hemoglobin A1C 10.80 (H) %     Narrative:       Hemoglobin A1C Ranges:    Increased Risk for Diabetes  5.7% to 6.4%  Diabetes                     >= 6.5%  Diabetic Goal                < 7.0%    Uric Acid [[id number]]  (Normal) Collected:  04/08/18 1604    Specimen:  Blood from Arm, Right Updated:  04/08/18 1637     Uric Acid 3.1 mg/dL     Troponin [[id number]]  (Normal) Collected:  04/08/18 1604    Specimen:  Blood from Arm, Right Updated:  04/08/18 1636     Troponin T <0.010 ng/mL     Narrative:       Troponin T Reference Ranges:  Less than 0.03 ng/mL:    Negative for AMI  0.03 to 0.09 ng/mL:      Indeterminant for AMI  Greater than 0.09 ng/mL: Positive for AMI    Lactic Acid, Plasma [[id number]]   (Normal) Collected:  04/08/18 1604    Specimen:  Blood from Arm, Right Updated:  04/08/18 1636     Lactate 1.5 mmol/L     Blood Gas, Arterial [[id number]]  (Abnormal) Collected:  04/08/18 1606    Specimen:  Arterial Blood Updated:  04/08/18 1609     Site Arterial: right radial     Kieran's Test Positive     pH, Arterial 7.469 (H) pH units      pCO2, Arterial 22.9 (L) mm Hg      pO2, Arterial 71.2 (L) mm Hg      HCO3, Arterial 16.6 (L) mmol/L      Base Excess, Arterial -5.2 (L) mmol/L      O2 Saturation Calculated 95.5 %      A-a Gradiant 0.6 mmHg      Barometric Pressure for Blood Gas 749.7 mmHg      Modality Room Air     FIO2 21 %      Set [facility] Resp Rate 25    Narrative:       o2 sat 94 Meter: [id number] : [id number] Lance Tesfaye    POC Glucose Once [[id number]]  (Abnormal) Collected:  04/08/18 1515    Specimen:  Blood Updated:  04/08/18 1527     Glucose 298 (H) mg/dL     Narrative:       Meter: [id number] : [id number] Carlitos MILLER RN           Imaging:  Imaging Results (last 24 hours)     Procedure Component Value Units Date/Time    MRI Foot Left With & Without Contrast [[id number]] Collected:  04/09/18 1011     Updated:  04/09/18 1507    Narrative:       LEFT MID AND FOREFOOT MRI WITH AND WITHOUT CONTRAST     HISTORY: Sepsis with cellulitis and swelling at the 3rd toe.     TECHNIQUE: MRI of the left mid and forefoot was performed with and  without IV contrast. There is no previous exam for comparison.     FINDINGS: Marrow signal throughout the mid and forefoot is normal.  Specifically, there is no marrow signal abnormality of the 3rd toe.  There is soft tissue edema throughout the mid and forefoot with soft  tissue enhancement along the 3rd toe. No fluid collection is observed at  the 3rd toe. The interphalangeal joints and the metatarsophalangeal  joints demonstrate normal amount of fluid and no abnormal contrast  enhancement.     There is some degenerative change at the 1st and 2nd  tarsometatarsal  joints with a small volume of fluid around the plantar edge of the 1st  tarsometatarsal joint. There is also a small volume of fluid in the  flexor hallucis longus tendon sheath at the same level. Some soft tissue  edema and enhancement is observed along the medial plantar surface of  the foot in the same region as the small fluid collections. There is no  marrow signal abnormality around the 1st metatarsophalangeal joint,  however. No other fluid collection is identified in the mid or forefoot.  The flexor and extensor tendons appear intact.       Impression:       Cellulitis at the 3rd toe without evidence of osteomyelitis  or septic arthritis.     There is some fluid in the flexor hallucis longus tendon sheath and  plantar to the 1st metatarsophalangeal joint, favored to be arising from  that joint, with some soft tissue enhancement along the plantar surface  of the foot in the same region. Given the clinical scenario, it is  possible that these changes are infectious. There is no evidence of  osteomyelitis around the visualized midfoot. The entire hindfoot is not  included in this exam.     This report was finalized on 4/9/2018 3:04 PM by Dr. Nagi Avalos MD.       XR Shoulder 2+ View Right [[id number]] Collected:  04/08/18 1634     Updated:  04/08/18 1946    Narrative:       RIGHT SHOULDER 2 VIEWS 04/08/2018      HISTORY: Right shoulder pain.     FINDINGS: There is some minimal hypertrophic change of the inferior  glenoid. No fractures or dislocations are seen. AC joint appears intact.       Impression:       1. No acute process.  2. Minimal hypertrophic change in the inferior glenoid.     This report was finalized on 4/8/2018 7:43 PM by Dr. Tim Gunderson MD.       MRI Brain Without Contrast [[id number]] Collected:  04/08/18 1933     Updated:  04/08/18 1933    Narrative:       BRAIN MRI WITHOUT GADOLINIUM     HISTORY: Confusion/delirium, altered LOC, unexplained.     COMPARISON: None.      FINDINGS:  Multiplanar images of the head were obtained without the use  of intravenous contrast.     There is a mild degree of generalized atrophy. There are a few scattered  foci of increased T2 and FLAIR signal abnormality in the white matter  bilaterally most likely sequelae of mild/early ischemic gliotic change.  No foci of restricted diffusion are demonstrated on diffusion weighted  images (DWI) to suggest acute ischemia. Remainder of the brain  parenchyma is unremarkable. The ventricular system is normal in caliber  and configuration for age. Midline structures corpus callosum, pituitary  gland, and brainstem are unremarkable. There is no evidence of extra  axial mass or fluid collection. The major vascular flow voids are  patent. The orbital and encompassed paranasal soft tissues are  unremarkable. Osseous marrow signal intensity is within normal limits.     Preliminary interpretation telephoned directly to Dr. Marquez during  interpretation at 7:19 PM.                Impression:       1. Very mild atrophy and chronic appearing ischemic gliotic changes, no  acute intracranial finding                   Assessment and Plan:     Active Problems:    Generalized weakness    His point she appears to be at her normal baseline and I believe the generalized weakness was related to the sepsis.  And associated metabolic encephalopathy.  I performed an independent eyeball review the MRI of the brain and it is normal I will sign off in follow-up.  Reconsult thank you    Nagi Watson MD  04/09/18  3:08 PM

## 2018-04-09 NOTE — NURSING NOTE
Daughter and another visitor at bedside pt is engaging and interacting with them . Dr Lockwood is on the unit  Does not want pt on CPAP at this time

## 2018-04-09 NOTE — PROGRESS NOTES
Mavis Juan MD       LOS: 2 days   Patient Care Team:  No Known Provider as PCP - General    Subjective     62 y.o. female awake and conversant today    Says she did not take any insulin for the last 3-31/2 weeks    Review of Systems  Review of Systems - no specific complaints    Objective     Vital Signs  Temp:  [99.3 °F (37.4 °C)-101.1 °F (38.4 °C)] 100.2 °F (37.9 °C)  Heart Rate:  [] 94  Resp:  [16-18] 16  BP: ()/(56-87) 107/60    Physical Exam  General: No acute distress. Alert and oriented   CV: RRR  Resp:  unlabored breathing on ra  Extremities: left 3 rd toe cellulitis, mild.  Palp distal pulses    Results Review:       Recent Results (from the past 12 hour(s))   Adult Transthoracic Echo Complete W/ Cont if Necessary Per Protocol    Collection Time: 04/08/18  9:52 PM   Result Value Ref Range    BSA 2.2 m^2    IVSd 1.0 cm    LVIDd 4.8 cm    LVIDs 3.6 cm    LVPWd 1.1 cm    IVS/LVPW 0.91     FS 25.0 %    EDV(Teich) 107.5 ml    ESV(Teich) 54.4 ml    EF(Teich) 49.4 %    EDV(cubed) 110.6 ml    ESV(cubed) 46.7 ml    EF(cubed) 57.8 %    LV mass(C)d 181.9 grams    LV mass(C)dI 83.8 grams/m^2    SV(Teich) 53.1 ml    SI(Teich) 24.5 ml/m^2    SV(cubed) 63.9 ml    SI(cubed) 29.5 ml/m^2    Ao root diam 3.0 cm    Ao root area 7.1 cm^2    ACS 1.8 cm    asc Aorta Diam 3.5 cm    LVOT diam 2.1 cm    LVOT area 3.5 cm^2    LVOT area(traced) 3.5 cm^2    RVOT diam 2.1 cm    RVOT area 3.5 cm^2    LVLd ap4 8.1 cm    EDV(MOD-sp4) 109.0 ml    LVLs ap4 7.0 cm    ESV(MOD-sp4) 39.0 ml    EF(MOD-sp4) 64.2 %    SV(MOD-sp4) 70.0 ml    SI(MOD-sp4) 32.3 ml/m^2    Ao root area (BSA corrected) 1.4     CONTRAST EF 4CH 64.2 ml/m^2    LV Diastolic corrected for BSA 50.2 ml/m^2    LV Systolic corrected for BSA 18.0 ml/m^2    MV A dur 0.12 sec    MV E max vaishali 68.9 cm/sec    MV A max vaishali 101.0 cm/sec    MV E/A 0.68     MV V2 max 114.0 cm/sec    MV max PG 5.2 mmHg    MV V2 mean 74.6 cm/sec    MV mean PG 3.0 mmHg    MV V2 VTI 20.9  cm    MVA(VTI) 2.8 cm^2    MV P1/2t max jn 105.0 cm/sec    MV P1/2t 51.3 msec    MVA(P1/2t) 4.3 cm^2    MV dec slope 599.0 cm/sec^2    MV dec time 0.13 sec    Ao pk jn 133.0 cm/sec    Ao max PG 7.1 mmHg    Ao max PG (full) 2.9 mmHg    Ao V2 mean 92.1 cm/sec    Ao mean PG 4.0 mmHg    Ao mean PG (full) 2.0 mmHg    Ao V2 VTI 21.3 cm    JULIO(I,A) 2.8 cm^2    JULIO(I,D) 2.8 cm^2    JULIO(V,A) 2.7 cm^2    JULIO(V,D) 2.7 cm^2    LV V1 max PG 4.2 mmHg    LV V1 mean PG 2.0 mmHg    LV V1 max 102.0 cm/sec    LV V1 mean 70.2 cm/sec    LV V1 VTI 17.1 cm    SV(Ao) 150.6 ml    SI(Ao) 69.4 ml/m^2    SV(LVOT) 59.2 ml    SV(RVOT) 39.5 ml    SI(LVOT) 27.3 ml/m^2    PA V2 max 115.0 cm/sec    PA max PG 5.3 mmHg    PA max PG (full) 2.9 mmHg     CV ECHO CARLEE - PVA(V,A) 2.3 cm^2     CV ECHO CARLEE - PVA(V,D) 2.3 cm^2    PA acc time 0.11 sec    RV V1 max PG 2.3 mmHg    RV V1 mean PG 1.0 mmHg    RV V1 max 76.6 cm/sec    RV V1 mean 46.6 cm/sec    RV V1 VTI 11.4 cm    RAP systole 3.0 mmHg    PA pr(Accel) 31.3 mmHg    Pulm Sys Jn 34.9 cm/sec    Pulm Deal Jn 37.6 cm/sec    Pulm S/D 0.93     Qp/Qs 0.67     Pulm A Revs Dur 0.09 sec    Pulm A Revs Jn 43.6 cm/sec    MVA P1/2T LCG 2.1 cm^2     CV ECHO CARLEE - BZI_BMI 37.0 kilograms/m^2     CV ECHO CARLEE - BSA([facility]) 2.3 m^2     CV ECHO CARLEE - BZI_METRIC_WEIGHT 107.1 kg     CV ECHO CARLEE - BZI_METRIC_HEIGHT 170.2 cm    Target HR (85%) 133 bpm    Max. Pred. HR (100%) 157 bpm     CV VAS BP RIGHT /72 mmHg    TDI S' 11.00 cm/sec    RV Base 2.40 cm    Ascending aorta 3.50 cm    E/E' ratio 6.0     LA Volume Index 16.0 mL/m2    Lat Peak E' Jn 11.0 cm/sec    Med Peak E' Jn 11.00 cm/sec    Abdo Ao Diam 2.10 cm    TAPSE (>1.6) 2.50 cm2   POC Glucose Once    Collection Time: 04/08/18 11:19 PM   Result Value Ref Range    Glucose 209 (H) 70 - 130 mg/dL   POC Glucose Once    Collection Time: 04/09/18  2:50 AM   Result Value Ref Range    Glucose 201 (H) 70 - 130 mg/dL   Troponin    Collection Time:  04/09/18  4:02 AM   Result Value Ref Range    Troponin T <0.010 0.000 - 0.030 ng/mL   Comprehensive Metabolic Panel    Collection Time: 04/09/18  4:02 AM   Result Value Ref Range    Glucose 205 (H) 65 - 99 mg/dL    BUN 25 (H) 8 - 23 mg/dL    Creatinine 0.93 0.57 - 1.00 mg/dL    Sodium 139 136 - 145 mmol/L    Potassium 3.4 (L) 3.5 - 5.2 mmol/L    Chloride 102 98 - 107 mmol/L    CO2 20.0 (L) 22.0 - 29.0 mmol/L    Calcium 8.5 (L) 8.6 - 10.5 mg/dL    Total Protein 6.5 6.0 - 8.5 g/dL    Albumin 3.00 (L) 3.50 - 5.20 g/dL    ALT (SGPT) 42 (H) 1 - 33 U/L    AST (SGOT) 44 (H) 1 - 32 U/L    Alkaline Phosphatase 65 39 - 117 U/L    Total Bilirubin 0.9 0.1 - 1.2 mg/dL    eGFR Non African Amer 61 >60 mL/min/1.73    Globulin 3.5 gm/dL    A/G Ratio 0.9 g/dL    BUN/Creatinine Ratio 26.9 (H) 7.0 - 25.0    Anion Gap 17.0 mmol/L   CBC Auto Differential    Collection Time: 04/09/18  4:02 AM   Result Value Ref Range    WBC 6.46 4.50 - 10.70 10*3/mm3    RBC 3.59 (L) 3.90 - 5.20 10*6/mm3    Hemoglobin 11.5 (L) 11.9 - 15.5 g/dL    Hematocrit 34.6 (L) 35.6 - 45.5 %    MCV 96.4 80.5 - 98.2 fL    MCH 32.0 26.9 - 32.0 pg    MCHC 33.2 32.4 - 36.3 g/dL    RDW 13.6 (H) 11.7 - 13.0 %    RDW-SD 48.1 37.0 - 54.0 fl    MPV 11.9 6.0 - 12.0 fL    Platelets 92 (L) 140 - 500 10*3/mm3   Scan Slide    Collection Time: 04/09/18  4:02 AM   Result Value Ref Range    Scan Slide     Manual Differential    Collection Time: 04/09/18  4:02 AM   Result Value Ref Range    Neutrophil % 87.0 (H) 42.7 - 76.0 %    Lymphocyte % 9.0 (L) 19.6 - 45.3 %    Monocyte % 3.0 (L) 5.0 - 12.0 %    Metamyelocyte % 1.0 (H) 0.0 - 0.0 %    Neutrophils Absolute 5.62 1.90 - 8.10 10*3/mm3    Lymphocytes Absolute 0.58 (L) 0.90 - 4.80 10*3/mm3    Monocytes Absolute 0.19 (L) 0.20 - 1.20 10*3/mm3    Dacrocytes Slight/1+ None Seen    WBC Morphology Normal Normal    Platelet Morphology Normal Normal   POC Glucose Once    Collection Time: 04/09/18  7:17 AM   Result Value Ref Range    Glucose 192  (H) 70 - 130 mg/dL   ]      Assessment/Plan           Active Problems:    Generalized weakness      Assessment & Plan  62 y.o. female left 3rd toe cellulitis, mild    MRI results pending      Mavis Juan MD  04/09/18  9:19 AM

## 2018-04-09 NOTE — PROGRESS NOTES
Continued Stay Note  [facility]     Patient Name: Elisabet Berry  MRN: [id number]  Today's Date: 4/9/2018    Admit Date: 4/7/2018          Discharge Plan     Row Name 04/09/18 1151       Plan    Plan Comments CCP spoke with Vanessa Hickey.  Pt is over income for Medicaid assistance.    Row Name 04/09/18 1141       Plan    Plan Home with family support    Patient/Family in Agreement with Plan yes    Plan Comments Spoke with pt and her daughter Lovely at bedside.  Introduced self and explained role.  CCP contact information provided.  Face sheet and pharmacy corrected.  Pt's PCP is Dr. Christine Phelps in [facility].  Pt states that she is covered under her 's insurance policy but according to [facility] , [place], when she calls the provider they state the policy covers pt's  only.  Pt and family will call insurance to correct the problem.  CCP was also alerted that pt cannot afford her diabetes medication.  She gets her medication through her PCP and [facility] pharmacy in [place].  CCP called the pharmacy and they state that pt's copay was $353.60 for Toujeo.  Pt was not able to afford the medication with a manufacurer coupon either.  Pt had also been on Victoza but was unable to afford that as well.  Pt's PCP had been supplying the pt with samples but those ran out.  Endocrinology will see pt at [facility] and CCP asked RN to pass along the need for cost effective medication regimine.  Once this is established and prescriptions are written, CCP will check the cost through the [facility] retail pharmacy and will communicate with pt's PCP.  Pt's  works night shift and is therefore not available at this time.  The diabetic educator has also been consulted.  Plan at this time is home with family support.              Discharge Codes    No documentation.           Ashley Zaidi RN

## 2018-04-09 NOTE — PLAN OF CARE
Problem: Patient Care Overview  Goal: Plan of Care Review  Outcome: Ongoing (interventions implemented as appropriate)   04/08/18 1569   OTHER   Outcome Summary Upon 15:00 assessment patient was aphasic and would follow some commands, Gaurding of right shoulder. Last normal was at noon (12:00). Pt previously followed all commands and answered all orientation questions correctly. Responses were delayed but appropriate. Current NIH scale of 13 (previous scale of 7). Dr Acuna and Dr Marquez at bedside - See orders. Cardiology, neurology and infectious disease consulted. Stat XRAY of right shoulder, blood gases, MRI of head and feet ordered. Due to patient becoming increasing restless during MRI of foot, only imaging of the left foot was done.- Dr Juan notified. Patient has a flat affect, follows some commands. Pt has extreme right shoulder pain with movement. Blood cultures positive. Daughter at bedside, updated and questions answered.       Problem: Skin Injury Risk (Adult)  Goal: Identify Related Risk Factors and Signs and Symptoms  Outcome: Ongoing (interventions implemented as appropriate)      Problem: Fall Risk (Adult)  Goal: Identify Related Risk Factors and Signs and Symptoms  Outcome: Ongoing (interventions implemented as appropriate)

## 2018-04-09 NOTE — CONSULTS
"Adult Nutrition  Assessment/PES    Patient Name:  Elisabet Berry  YOB: 1955  MRN: [id number]  Admit Date:  4/7/2018    Assessment Date:  4/9/2018    Comments:  Nutrition assessment completed. Will add diabetic restrictions to diet order and encourage compliance.          Adult Nutrition Assessment     Row Name 04/09/18 0858       Nutrition Prescription PO    Current PO Diet Full Liquid    Row Name 04/09/18 0854       Calculation Measurements    Weight Used For Calculations 107 kg (235 lb 14.3 oz)       Estimated/Assessed Needs    Additional Documentation Calorie Requirements (Group);Protein Requirements (Group);Fluid Requirements (Group)       Calorie Requirements    Estimated Calorie Requirement Comment 5318-0454   15-20 kcals/kg       KCAL/KG    14 Kcal/Kg (kcal) 1498    15 Kcal/Kg (kcal) 1605    18 Kcal/Kg (kcal) 1926    20 Kcal/Kg (kcal) 2140    25 Kcal/Kg (kcal) 2675    30 Kcal/Kg (kcal) 3210    35 Kcal/Kg (kcal) 3745    40 Kcal/Kg (kcal) 4280    45 Kcal/Kg (kcal) 4815    50 Kcal/Kg (kcal) 5350       McIntire-St. Jeor Equation    RMR (McIntire-St. Jeor Equation) 1657.75       Protein Requirements    Est Protein Requirement Amount (gms/kg) 0.8 gm protein       Estimated Protein Requirements (gms/day) 85.6       Fluid Requirements    Estimated Fluid Requirements (mL/day) 2140    RDA Method (mL) 2140    Austin-Segar Method (over 20 kg) 3640    Row Name 04/09/18 0852       Physical Findings    Overall Physical Appearance obese    Skin poor skin integrity/turgor;other (see comments)   left 3rd tod diabetic ulceration, occipital region laceration    Row Name 04/09/18 0851       Anthropometrics    Height 170.2 cm (67.01\")    Weight 107 kg (236 lb 12.4 oz)       Ideal Body Weight (IBW)    Ideal Body Weight (IBW) (kg) 61.88    % Ideal Body Weight 173.56       Body Mass Index (BMI)    BMI (kg/m2) 37.15    BMI Assessment BMI 35-39.9: obesity grade II       IBW Adjustment, Para/Tetraplegia    5% " Adjustment, Para (IBW) 58.79    10% Adjustment, Para (IBW) 55.69    10% Adjustment, Tetra (IBW) 55.69    15% Adjustment, Tetra (IBW) 52.6       Labs/Procedures/Meds    Lab Results Reviewed reviewed    Lab Results Comments k, glu, alt, alb, A1c, hgb/hct    Row Name 04/09/18 0850       Reason for Assessment    Reason For Assessment other (see comments)   CCU rounds    Diagnosis --   weakness, toe infection, hx of DM, HTN          Problem/Interventions:        Problem 1     Row Name 04/09/18 0859       Nutrition Diagnoses Problem 1    Problem 1 Overweight/Obesity    Etiology (related to) --   weakness, DM, HTN, toe diabetic ulcer                    Intervention Goal     Row Name 04/09/18 0903       Intervention Goal    General Maintain nutrition    PO Tolerate PO;PO intake (%)    PO Intake % 80 %    Weight No significant weight loss            Nutrition Intervention     Row Name 04/09/18 0903       Nutrition Intervention    RD/Tech Action Follow Tx progress;Care plan reviewd            Nutrition Prescription     Row Name 04/09/18 0903       Nutrition Prescription PO    PO Prescription Begin/change diet    Begin/Change Diet to Full Liquid    Common Modifiers Consistent Carbohydrate            Education/Evaluation     Row Name 04/09/18 0903       Education    Education Will Instruct as appropriate       Monitor/Evaluation    Monitor Per protocol        Electronically signed by:  Aleena Vyas RD  04/09/18 9:05 AM

## 2018-04-09 NOTE — PLAN OF CARE
Problem: Patient Care Overview  Goal: Plan of Care Review  Outcome: Ongoing (interventions implemented as appropriate)   04/09/18 1802   Coping/Psychosocial   Plan of Care Reviewed With patient;daughter   Plan of Care Review   Progress improving   OTHER   Outcome Summary Vitals stable. Temp max 100.2. No neuro deficit noted. To be transferred out of critical care when bed is available. Patient/ daughter updated on plan of care.       Problem: Skin Injury Risk (Adult)  Goal: Identify Related Risk Factors and Signs and Symptoms  Outcome: Outcome(s) achieved Date Met: 04/09/18    Goal: Skin Health and Integrity  Outcome: Ongoing (interventions implemented as appropriate)      Problem: Fall Risk (Adult)  Goal: Identify Related Risk Factors and Signs and Symptoms  Outcome: Outcome(s) achieved Date Met: 04/09/18    Goal: Absence of Fall  Outcome: Ongoing (interventions implemented as appropriate)      Problem: Sepsis/Septic Shock (Adult)  Goal: Signs and Symptoms of Listed Potential Problems Will be Absent, Minimized or Managed (Sepsis/Septic Shock)  Outcome: Ongoing (interventions implemented as appropriate)      Problem: Pain, Acute (Adult)  Goal: Identify Related Risk Factors and Signs and Symptoms  Outcome: Outcome(s) achieved Date Met: 04/09/18    Goal: Acceptable Pain Control/Comfort Level  Outcome: Ongoing (interventions implemented as appropriate)

## 2018-04-09 NOTE — CONSULTS
62 y.o.  Patient Care Team:  Christine Phelps DO as PCP - General (Family Medicine)      Chief Complaint   Patient presents with   • Fall     multiple falls today. last fall, hit head. small abrasion with hematoma to back of head. denies loc. denies blood thinner use. last normal was 1330 today.    Uncontrolled type 2 diabetes mellitus with competitions    HPI   Patient is a 62-year-old white female with a past history of that medicine hypertension admitted to the hospital with altered mental status multiple falls and injury to the head and is currently being treated for cellulitis left leg and is currently on antibiotics  Patient's blood sugars were noted to be in the 200-400 range  Her hemoglobin A1c was 10.8 and was consulted for further managing patient's diabetes    Patient's mental status apparently improved as per the daughter at the bedside  Patient is able to answer questions  She reported that she's been diabetic for more than 20 years.  Until approximately 5 years ago she was working and was able to get care  She remembers being on toujeo 60 units daily along with victoza 1.8 mg daily.  She reports that the blood sugars used to be under control  She lost her job approximately 3 years ago and was managing with samples from the doctor's office.  She could not get consistent help with samples and goes through periods of no medication at all  Patient has not been checking her Accu-Cheks but reports that her blood sugars could range from 100-300 range sometimes higher    Patient reports symptoms of diabetic peripheral neuropathy and is currently on gabapentin  Patient denied any knowledge of diabetic nephropathy  She denied any knowledge of diabetic retinopathy even though her last eye examination was about 2-3 years ago.    At the time of admission patient was noted to be in acute renal failure CT scan of the head was negative for any acute pathology next line patient is currently under nephrology care as  well as intensive care specialists    Patient reported significant cost issues with the Levemir and victoza and was not able to use it without samples.   apparently works in construction and was supposed to have insurance but they're still trying to verify that coverage       Past Medical History:   Diagnosis Date   • Arthritis    • Diabetes mellitus    • Gout    • Peripheral neuropathy        No family history on file.    Social History     Social History   • Marital status:      Spouse name: N/A   • Number of children: N/A   • Years of education: N/A     Occupational History   • Not on file.     Social History Main Topics   • Smoking status: Never Smoker   • Smokeless tobacco: Not on file   • Alcohol use Not on file   • Drug use: Unknown   • Sexual activity: Not on file     Other Topics Concern   • Not on file     Social History Narrative   • No narrative on file       Allergies   Allergen Reactions   • Colchicine Unknown (See Comments)     Await pt information   • Erythromycin Unknown (See Comments)     Await pt information    • Sulfa Antibiotics Unknown (See Comments)     Await pt information   • Tramadol Other (See Comments)     Fever with in increase dosages         Current Facility-Administered Medications:   •  acetaminophen (TYLENOL) suppository 650 mg, 650 mg, Rectal, Q4H PRN, Christian Mcknight MD, 650 mg at 04/08/18 2019  •  cefTRIAXone (ROCEPHIN) 2 g/50 mL 0.9% NS (MBP), 2 g, Intravenous, Q24H, Kalyan Deleon MD, 2 g at 04/09/18 1135  •  dextrose (D50W) solution 25 g, 25 g, Intravenous, Q15 Min PRN, Christian Mcknight MD  •  dextrose (GLUTOSE) oral gel 15 g, 15 g, Oral, Q15 Min PRN, Christian Mcknight MD  •  gabapentin (NEURONTIN) capsule 600 mg, 600 mg, Oral, Q8H, Jerry Russo MD, 600 mg at 04/09/18 1319  •  gadobenate dimeglumine (MULTIHANCE) injection 20 mL, 20 mL, Intravenous, Once in imaging, Paul Key MD  •  glucagon (human recombinant) (GLUCAGEN DIAGNOSTIC)  injection 1 mg, 1 mg, Subcutaneous, PRN, Christian Mcknight MD  •  insulin aspart (novoLOG) injection 2-5 Units, 2-5 Units, Subcutaneous, 4x Daily AC & at Bedtime, Mike DING MD  •  insulin aspart (novoLOG) injection 5 Units, 5 Units, Subcutaneous, TID With Meals, Mike DING MD  •  insulin detemir (LEVEMIR) injection 25 Units, 25 Units, Subcutaneous, Nightly, Mike DING MD  •  morphine injection 2 mg, 2 mg, Intravenous, Q3H PRN, Paul Key MD, 2 mg at 04/08/18 1004  •  morphine injection 4 mg, 4 mg, Intravenous, Q4H PRN, Paul Key MD  •  ondansetron (ZOFRAN) injection 4 mg, 4 mg, Intravenous, Q4H PRN, Maria Luisa Acuna MD, 4 mg at 04/09/18 1450  •  potassium chloride 10 mEq in 100 mL IVPB, 10 mEq, Intravenous, Q1H PRN, Maria Luisa Acuna MD, Last Rate: 100 mL/hr at 04/09/18 1458, 10 mEq at 04/09/18 1458  •  sodium chloride 0.9 % flush 1-10 mL, 1-10 mL, Intravenous, PRN, Christian Mcknight MD  •  sodium chloride 0.9 % flush 10 mL, 10 mL, Intravenous, PRN, Antolin Montero MD  •  sodium chloride 0.9 % infusion, 9 mL/hr, Intravenous, Continuous, Maria Luisa Acuna MD, Last Rate: 9 mL/hr at 04/09/18 0821, 9 mL/hr at 04/09/18 0821         Review of Systems   Constitutional: Positive for appetite change, fatigue and unexpected weight change.   Eyes: Negative.    Respiratory: Negative.    Cardiovascular: Negative.    Gastrointestinal: Positive for abdominal distention. Negative for abdominal pain.   Endocrine: Negative for polydipsia and polyphagia.   Musculoskeletal: Negative.    Skin: Negative.    Neurological: Positive for dizziness and numbness.   Psychiatric/Behavioral: Positive for confusion.   All other systems reviewed and are negative.    Objective     Vital Signs  Temp:  [98.9 °F (37.2 °C)-101.1 °F (38.4 °C)] 98.9 °F (37.2 °C)  Heart Rate:  [] 86  Resp:  [16-24] 20  BP: ()/(49-78) 93/62    Physical Exam  Physical Exam   Constitutional: She is oriented to person,  place, and time. She appears well-developed and well-nourished.   Eyes: EOM are normal. Pupils are equal, round, and reactive to light.   Neck: Normal range of motion. Neck supple. No thyromegaly present.   Cardiovascular: Normal rate, regular rhythm, normal heart sounds and intact distal pulses.    Pulmonary/Chest: Effort normal and breath sounds normal.   Abdominal: Soft. Bowel sounds are normal. She exhibits distension. There is no tenderness.   Musculoskeletal: Normal range of motion. She exhibits no edema.   Neurological: She is alert and oriented to person, place, and time.   Skin: Skin is warm and dry.   Psychiatric: She has a normal mood and affect. Her behavior is normal.   Nursing note and vitals reviewed.      Results Review:    I reviewed the patient's new clinical results.  Glucose   Date/Time Value Ref Range Status   04/09/2018 1639 179 (H) 70 - 130 mg/dL Final   04/09/2018 1123 254 (H) 70 - 130 mg/dL Final   04/09/2018 0717 192 (H) 70 - 130 mg/dL Final   04/09/2018 0250 201 (H) 70 - 130 mg/dL Final   04/08/2018 2319 209 (H) 70 - 130 mg/dL Final   04/08/2018 1925 235 (H) 70 - 130 mg/dL Final   04/08/2018 1515 298 (H) 70 - 130 mg/dL Final   04/08/2018 1120 256 (H) 70 - 130 mg/dL Final   04/08/2018 0629 255 (H) 70 - 130 mg/dL Final   04/08/2018 0006 394 (H) 70 - 130 mg/dL Final     Lab Results (last 72 hours)     Procedure Component Value Units Date/Time    POC Glucose Once [[id number]]  (Abnormal) Collected:  04/09/18 1639    Specimen:  Blood Updated:  04/09/18 1642     Glucose 179 (H) mg/dL     Narrative:       Meter: [id number] : [id number] Anup ZAMORA    Immature Platelet Fraction [[id number]]  (Abnormal) Collected:  04/09/18 1158    Specimen:  Blood Updated:  04/09/18 1214     IPF 4.50 %      Platelets 75 (L) 10*3/mm3     POC Glucose Once [[id number]]  (Abnormal) Collected:  04/09/18 1123    Specimen:  Blood Updated:  04/09/18 1126     Glucose 254 (H) mg/dL     Narrative:       Meter:  [id number] : [id number] Martin ZAMORA    Blood Culture - Blood, [[id number]] Collected:  04/09/18 0930    Specimen:  Blood from Arm, Left Updated:  04/09/18 0944    Blood Culture - Blood, [[id number]]  (Abnormal) Collected:  04/07/18 2250    Specimen:  Blood from Arm, Left Updated:  04/09/18 0932     Blood Culture --      Streptococcus, Beta Hemolytic, Group C (A)     Isolated from Aerobic and Anaerobic Bottles     STREP GROUPING C     Gram Stain Result Anaerobic Bottle Gram positive cocci in chains      Aerobic Bottle Gram positive cocci in chains    Blood Culture - Blood, [[id number]] Collected:  04/09/18 0848    Specimen:  Blood from Hand, Left Updated:  04/09/18 0920    POC Glucose Once [[id number]]  (Abnormal) Collected:  04/09/18 0717    Specimen:  Blood Updated:  04/09/18 0719     Glucose 192 (H) mg/dL     Narrative:       Meter: [id number] : [id number] Martin ZAMORA    CBC & Differential [[id number]] Collected:  04/09/18 0402    Specimen:  Blood Updated:  04/09/18 0506    Narrative:       The following orders were created for panel order CBC & Differential.  Procedure                               Abnormality         Status                     ---------                               -----------         ------                     Manual Differential[[id number]]          Abnormal            Final result               Scan Slide[[id number]]                                       Final result               CBC Auto Differential[[id number]]        Abnormal            Final result                 Please view results for these tests on the individual orders.    CBC Auto Differential [[id number]]  (Abnormal) Collected:  04/09/18 0402    Specimen:  Blood Updated:  04/09/18 0506     WBC 6.46 10*3/mm3      RBC 3.59 (L) 10*6/mm3      Hemoglobin 11.5 (L) g/dL      Hematocrit 34.6 (L) %      MCV 96.4 fL      MCH 32.0 pg      MCHC 33.2 g/dL      RDW 13.6 (H) %      RDW-SD 48.1 fl      MPV 11.9 fL      Platelets 92 (L)  10*3/mm3     Scan Slide [[id number]] Collected:  04/09/18 0402    Specimen:  Blood Updated:  04/09/18 0506     Scan Slide --     Comment: See Manual Differential Results       Manual Differential [[id number]]  (Abnormal) Collected:  04/09/18 0402    Specimen:  Blood Updated:  04/09/18 0506     Neutrophil % 87.0 (H) %      Comment: 1+ bands noted        Lymphocyte % 9.0 (L) %      Monocyte % 3.0 (L) %      Metamyelocyte % 1.0 (H) %      Neutrophils Absolute 5.62 10*3/mm3      Lymphocytes Absolute 0.58 (L) 10*3/mm3      Monocytes Absolute 0.19 (L) 10*3/mm3      Dacrocytes Slight/1+     WBC Morphology Normal     Platelet Morphology Normal    Comprehensive Metabolic Panel [[id number]]  (Abnormal) Collected:  04/09/18 0402    Specimen:  Blood Updated:  04/09/18 0506     Glucose 205 (H) mg/dL      BUN 25 (H) mg/dL      Creatinine 0.93 mg/dL      Sodium 139 mmol/L      Potassium 3.4 (L) mmol/L      Chloride 102 mmol/L      CO2 20.0 (L) mmol/L      Calcium 8.5 (L) mg/dL      Total Protein 6.5 g/dL      Albumin 3.00 (L) g/dL      ALT (SGPT) 42 (H) U/L      AST (SGOT) 44 (H) U/L      Alkaline Phosphatase 65 U/L      Total Bilirubin 0.9 mg/dL      eGFR Non African Amer 61 mL/min/1.73      Globulin 3.5 gm/dL      A/G Ratio 0.9 g/dL      BUN/Creatinine Ratio 26.9 (H)     Anion Gap 17.0 mmol/L     Troponin [[id number]]  (Normal) Collected:  04/09/18 0402    Specimen:  Blood Updated:  04/09/18 0506     Troponin T <0.010 ng/mL     Narrative:       Troponin T Reference Ranges:  Less than 0.03 ng/mL:    Negative for AMI  0.03 to 0.09 ng/mL:      Indeterminant for AMI  Greater than 0.09 ng/mL: Positive for AMI    POC Glucose Once [[id number]]  (Abnormal) Collected:  04/09/18 0250    Specimen:  Blood Updated:  04/09/18 0251     Glucose 201 (H) mg/dL     Narrative:       Meter: [id number] : [id number] Frantz CARLOS    POC Glucose Once [[id number]]  (Abnormal) Collected:  04/08/18 2319    Specimen:  Blood Updated:  04/08/18 2327      Glucose 209 (H) mg/dL     Narrative:       Meter: [id number] : [id number] Frantzantionette Olivo TERRANCE    Blood Culture - Blood, [[id number]]  (Normal) Collected:  04/07/18 2213    Specimen:  Blood from Hand, Left Updated:  04/08/18 2231     Blood Culture No growth at 24 hours    POC Glucose Once [[id number]]  (Abnormal) Collected:  04/08/18 1925    Specimen:  Blood Updated:  04/08/18 1926     Glucose 235 (H) mg/dL     Narrative:       Meter: [id number] : [id number] Linda LLANES RN    Blood Culture ID, PCR - Blood, [[id number]]  (Abnormal) Collected:  04/07/18 2250    Specimen:  Blood from Arm, Left Updated:  04/08/18 1800     BCID, PCR Streptococcus spp, not A, B, or pneumoniae. Identification by BCID PCR. (C)    Hemoglobin A1c [[id number]]  (Abnormal) Collected:  04/08/18 0059    Specimen:  Blood Updated:  04/08/18 1705     Hemoglobin A1C 10.80 (H) %     Narrative:       Hemoglobin A1C Ranges:    Increased Risk for Diabetes  5.7% to 6.4%  Diabetes                     >= 6.5%  Diabetic Goal                < 7.0%    Uric Acid [[id number]]  (Normal) Collected:  04/08/18 1604    Specimen:  Blood from Arm, Right Updated:  04/08/18 1637     Uric Acid 3.1 mg/dL     Troponin [[id number]]  (Normal) Collected:  04/08/18 1604    Specimen:  Blood from Arm, Right Updated:  04/08/18 1636     Troponin T <0.010 ng/mL     Narrative:       Troponin T Reference Ranges:  Less than 0.03 ng/mL:    Negative for AMI  0.03 to 0.09 ng/mL:      Indeterminant for AMI  Greater than 0.09 ng/mL: Positive for AMI    Lactic Acid, Plasma [[id number]]  (Normal) Collected:  04/08/18 1604    Specimen:  Blood from Arm, Right Updated:  04/08/18 1636     Lactate 1.5 mmol/L     Blood Gas, Arterial [[id number]]  (Abnormal) Collected:  04/08/18 1606    Specimen:  Arterial Blood Updated:  04/08/18 1609     Site Arterial: right radial     Kieran's Test Positive     pH, Arterial 7.469 (H) pH units      pCO2, Arterial 22.9 (L) mm Hg      pO2, Arterial 71.2 (L) mm  Hg      HCO3, Arterial 16.6 (L) mmol/L      Base Excess, Arterial -5.2 (L) mmol/L      O2 Saturation Calculated 95.5 %      A-a Gradiant 0.6 mmHg      Barometric Pressure for Blood Gas 749.7 mmHg      Modality Room Air     FIO2 21 %      Set [facility] Resp Rate 25    Narrative:       o2 sat 94 Meter: [id number] : [id number] Lance Tesfaye    POC Glucose Once [[id number]]  (Abnormal) Collected:  04/08/18 1515    Specimen:  Blood Updated:  04/08/18 1527     Glucose 298 (H) mg/dL     Narrative:       Meter: [id number] : [id number] Carlitos MILLER RN    Troponin [[id number]]  (Normal) Collected:  04/08/18 1051    Specimen:  Blood Updated:  04/08/18 1124     Troponin T <0.010 ng/mL     Narrative:       Troponin T Reference Ranges:  Less than 0.03 ng/mL:    Negative for AMI  0.03 to 0.09 ng/mL:      Indeterminant for AMI  Greater than 0.09 ng/mL: Positive for AMI    POC Glucose Once [[id number]]  (Abnormal) Collected:  04/08/18 1120    Specimen:  Blood Updated:  04/08/18 1123     Glucose 256 (H) mg/dL     Narrative:       Meter: [id number] : [id number] Deepak ZAMORA    [facility] Spotted Fever, IgG [[id number]] Collected:  04/08/18 1051    Specimen:  Blood Updated:  04/08/18 1055    [place] Spotted Fever, IgM [[id number]] Collected:  04/08/18 1051    Specimen:  Blood Updated:  04/08/18 1055    Ehrlichia Antibody Panel [[id number]] Collected:  04/08/18 1051    Specimen:  Blood Updated:  04/08/18 1055    B. Burgdorferi Antibodies, WB Reflex [[id number]] Collected:  04/08/18 1051    Specimen:  Blood Updated:  04/08/18 1055    Babesia Microti Antibody Panel [[id number]] Collected:  04/08/18 1051    Specimen:  Blood Updated:  04/08/18 1055    POC Glucose Once [[id number]]  (Abnormal) Collected:  04/08/18 0629    Specimen:  Blood Updated:  04/08/18 0640     Glucose 255 (H) mg/dL     Narrative:       Meter: [id number] : [id number] Kadeem Hayes RN    Troponin [[id number]]  (Normal) Collected:  04/08/18 0441     Specimen:  Blood Updated:  04/08/18 0532     Troponin T <0.010 ng/mL     Narrative:       Troponin T Reference Ranges:  Less than 0.03 ng/mL:    Negative for AMI  0.03 to 0.09 ng/mL:      Indeterminant for AMI  Greater than 0.09 ng/mL: Positive for AMI    Lactic Acid, Reflex Timer (This will reflex a repeat order 3-3:15 hours after ordered.) [[id number]] Collected:  04/07/18 2214    Specimen:  Blood Updated:  04/08/18 0303     Extra Tube Hold for add-ons.     Comment: Auto resulted.       Lactic Acid, Plasma [[id number]]  (Abnormal) Collected:  04/08/18 0216    Specimen:  Blood Updated:  04/08/18 0246     Lactate 2.4 (C) mmol/L     Comprehensive Metabolic Panel [[id number]]  (Abnormal) Collected:  04/08/18 0059    Specimen:  Blood Updated:  04/08/18 0142     Glucose 316 (H) mg/dL      BUN 22 mg/dL      Creatinine 0.92 mg/dL      Sodium 139 mmol/L      Potassium 3.6 mmol/L      Chloride 105 mmol/L      CO2 17.1 (L) mmol/L      Calcium 6.9 (L) mg/dL      Total Protein 5.5 (L) g/dL      Albumin 2.80 (L) g/dL      ALT (SGPT) 39 (H) U/L      AST (SGOT) 30 U/L      Alkaline Phosphatase 74 U/L      Total Bilirubin 0.8 mg/dL      eGFR Non African Amer 62 mL/min/1.73      Globulin 2.7 gm/dL      A/G Ratio 1.0 g/dL      BUN/Creatinine Ratio 23.9     Anion Gap 16.9 mmol/L     Troponin [[id number]]  (Normal) Collected:  04/08/18 0059    Specimen:  Blood Updated:  04/08/18 0139     Troponin T <0.010 ng/mL     Narrative:       Troponin T Reference Ranges:  Less than 0.03 ng/mL:    Negative for AMI  0.03 to 0.09 ng/mL:      Indeterminant for AMI  Greater than 0.09 ng/mL: Positive for AMI    CBC & Differential [[id number]] Collected:  04/08/18 0059    Specimen:  Blood Updated:  04/08/18 0138    Narrative:       The following orders were created for panel order CBC & Differential.  Procedure                               Abnormality         Status                     ---------                               -----------         ------                      CBC Auto Differential[[id number]]        Abnormal            Final result                 Please view results for these tests on the individual orders.    CBC Auto Differential [[id number]]  (Abnormal) Collected:  04/08/18 0059    Specimen:  Blood Updated:  04/08/18 0138     WBC 9.46 10*3/mm3      RBC 3.51 (L) 10*6/mm3      Hemoglobin 11.2 (L) g/dL      Hematocrit 34.9 (L) %      MCV 99.4 (H) fL      MCH 31.9 pg      MCHC 32.1 (L) g/dL      RDW 13.4 (H) %      RDW-SD 48.7 fl      MPV 11.0 fL      Platelets 86 (L) 10*3/mm3      Neutrophil % 88.5 (H) %      Lymphocyte % 7.1 (L) %      Monocyte % 4.0 (L) %      Eosinophil % 0.0 (L) %      Basophil % 0.1 %      Immature Grans % 0.3 %      Neutrophils, Absolute 8.37 (H) 10*3/mm3      Lymphocytes, Absolute 0.67 (L) 10*3/mm3      Monocytes, Absolute 0.38 10*3/mm3      Eosinophils, Absolute 0.00 10*3/mm3      Basophils, Absolute 0.01 10*3/mm3      Immature Grans, Absolute 0.03 10*3/mm3     Blood Gas, Arterial [[id number]]  (Abnormal) Collected:  04/08/18 0015    Specimen:  Arterial Blood Updated:  04/08/18 0017     Site Arterial: left radial     Kieran's Test Positive     pH, Arterial 7.453 (H) pH units      pCO2, Arterial 26.6 (L) mm Hg      pO2, Arterial 75.1 (L) mm Hg      HCO3, Arterial 18.6 (L) mmol/L      Base Excess, Arterial -3.8 (L) mmol/L      O2 Saturation Calculated 95.9 %      A-a Gradiant 0.6 mmHg      Barometric Pressure for Blood Gas 751.9 mmHg      Modality Room Air     FIO2 21 %      Rate 24 Breaths/minute     Narrative:       sats=94% Meter: [id number] : [id number] Zackery Dos Santos    POC Glucose Once [[id number]]  (Abnormal) Collected:  04/08/18 0006    Specimen:  Blood Updated:  04/08/18 0015     Glucose 394 (H) mg/dL     Narrative:       Meter: [id number] : [id number] Nicki Harden RN    Respiratory Panel, PCR - Swab, Nasopharynx [[id number]]  (Normal) Collected:  04/07/18 6372    Specimen:  Swab from Nasopharynx Updated:  04/08/18  "0004     ADENOVIRUS, PCR Not Detected     Coronavirus 229E Not Detected     Coronavirus HKU1 Not Detected     Coronavirus NL63 Not Detected     Coronavirus OC43 Not Detected     Human Metapneumovirus Not Detected     Human Rhinovirus/Enterovirus Not Detected     Influenza B PCR Not Detected     Parainfluenza Virus 1 Not Detected     Parainfluenza Virus 2 Not Detected     Parainfluenza Virus 3 Not Detected     Parainfluenza Virus 4 Not Detected     Bordetella pertussis pcr Not Detected     Influenza A H1 2009 PCR Not Detected     Chlamydophila pneumoniae PCR Not Detected     Mycoplasma pneumo by PCR Not Detected     Influenza A PCR Not Detected     Influenza A H3 Not Detected     Influenza A H1 Not Detected     RSV, PCR Not Detected    Lactic Acid, Plasma [[id number]]  (Abnormal) Collected:  04/07/18 2214    Specimen:  Blood Updated:  04/07/18 2359     Lactate 3.6 (C) mmol/L     C-reactive Protein [[id number]]  (Abnormal) Collected:  04/07/18 1858    Specimen:  Blood Updated:  04/07/18 2316     C-Reactive Protein 5.43 (H) mg/dL     Procalcitonin [[id number]]  (Abnormal) Collected:  04/07/18 1858    Specimen:  Blood Updated:  04/07/18 2135     Procalcitonin 1.08 (C) ng/mL     Narrative:       As a Marker for Sepsis (Non-Neonates):   1. <0.5 ng/mL represents a low risk of severe sepsis and/or septic shock.  1. >2 ng/mL represents a high risk of severe sepsis and/or septic shock.    As a Marker for Lower Respiratory Tract Infections that require antibiotic therapy:  PCT on Admission     Antibiotic Therapy             6-12 Hrs later  > 0.5                Strongly Recommended            >0.25 - <0.5         Recommended  0.1 - 0.25           Discouraged                   Remeasure/reassess PCT  <0.1                 Strongly Discouraged          Remeasure/reassess PCT      As 28 day mortality risk marker: \"Change in Procalcitonin Result\" (> 80 % or <=80 %) if Day 0 (or Day 1) and Day 4 values are available. Refer to " http://[email or web address]-pct-calculator.[email or web address]/   Change in PCT <=80 %   A decrease of PCT levels below or equal to 80 % defines a positive change in PCT test result representing a higher risk for 28-day all-cause mortality of patients diagnosed with severe sepsis or septic shock.  Change in PCT > 80 %   A decrease of PCT levels of more than 80 % defines a negative change in PCT result representing a lower risk for 28-day all-cause mortality of patients diagnosed with severe sepsis or septic shock.                Urine Drug Screen - Urine, Clean Catch [[id number]]  (Normal) Collected:  04/07/18 1942    Specimen:  Urine from Urine, Clean Catch Updated:  04/07/18 2020     Amphet/Methamphet, Screen Negative     Barbiturates Screen, Urine Negative     Benzodiazepine Screen, Urine Negative     Cocaine Screen, Urine Negative     Opiate Screen Negative     THC, Screen, Urine Negative     Methadone Screen, Urine Negative     Oxycodone Screen, Urine Negative    Narrative:       Negative Thresholds For Drugs Screened:     Amphetamines               500 ng/ml   Barbiturates               200 ng/ml   Benzodiazepines            100 ng/ml   Cocaine                    300 ng/ml   Methadone                  300 ng/ml   Opiates                    300 ng/ml   Oxycodone                  100 ng/ml   THC                        50 ng/ml    The Normal Value for all drugs tested is negative. This report includes final unconfirmed screening results to be used for medical treatment purposes only. Unconfirmed results must not be used for non-medical purposes such as employment or legal testing. Clinical consideration should be applied to any drug of abuse test, particulary when unconfirmed results are used.    Urinalysis With / Microscopic If Indicated - Urine, Clean Catch [[id number]]  (Abnormal) Collected:  04/07/18 1942    Specimen:  Urine from Urine, Clean Catch Updated:  04/07/18 2004     Color, UA Yellow     Appearance, UA Clear     pH, UA 6.0      Specific Gravity, UA 1.028     Glucose, UA >=1000 mg/dL (3+) (A)     Ketones, UA 15 mg/dL (1+) (A)     Bilirubin, UA Negative     Blood, UA Negative     Protein, UA Negative     Leuk Esterase, UA Negative     Nitrite, UA Negative     Urobilinogen, UA 0.2 E.U./dL    Narrative:       Urine microscopic not indicated.    [place] Draw [[id number]] Collected:  04/07/18 1858    Specimen:  Blood Updated:  04/07/18 2001    Narrative:       The following orders were created for panel order [place] Draw.  Procedure                               Abnormality         Status                     ---------                               -----------         ------                     Light Blue Top[[id number]]                                   Final result               Green Top (Gel)[[id number]]                                  Final result               Lavender Top[[id number]]                                     Final result               Gold Top - SST[[id number]]                                   Final result                 Please view results for these tests on the individual orders.    Light Blue Top [[id number]] Collected:  04/07/18 1858    Specimen:  Blood Updated:  04/07/18 2001     Extra Tube hold for add-on     Comment: Auto resulted       Green Top (Gel) [[id number]] Collected:  04/07/18 1858    Specimen:  Blood Updated:  04/07/18 2001     Extra Tube Hold for add-ons.     Comment: Auto resulted.       Lavender Top [[id number]] Collected:  04/07/18 1858    Specimen:  Blood Updated:  04/07/18 2001     Extra Tube hold for add-on     Comment: Auto resulted       Gold Top - SST [[id number]] Collected:  04/07/18 1858    Specimen:  Blood Updated:  04/07/18 2001     Extra Tube Hold for add-ons.     Comment: Auto resulted.       CK [[id number]]  (Normal) Collected:  04/07/18 1858    Specimen:  Blood Updated:  04/07/18 1951     Creatine Kinase 62 U/L     Ethanol [[id number]] Collected:  04/07/18 1858    Specimen:  Blood Updated:  04/07/18  1951     Ethanol <10 mg/dL      Ethanol % <0.010 %     Comprehensive Metabolic Panel [[id number]]  (Abnormal) Collected:  04/07/18 1858    Specimen:  Blood Updated:  04/07/18 1934     Glucose 437 (C) mg/dL      BUN 30 (H) mg/dL      Creatinine 1.30 (H) mg/dL      Sodium 130 (L) mmol/L      Potassium 5.8 (H) mmol/L      Chloride 91 (L) mmol/L      CO2 24.2 mmol/L      Calcium 9.7 mg/dL      Total Protein 8.0 g/dL      Albumin 4.30 g/dL      ALT (SGPT) 63 (H) U/L      AST (SGOT) 48 (H) U/L      Alkaline Phosphatase 127 (H) U/L      Total Bilirubin 0.8 mg/dL      eGFR Non African Amer 41 (L) mL/min/1.73      eGFR  African Amer 50 (L) mL/min/1.73      Globulin 3.7 gm/dL      A/G Ratio 1.2 g/dL      BUN/Creatinine Ratio 23.1     Anion Gap 14.8 mmol/L     Troponin [[id number]]  (Normal) Collected:  04/07/18 1858    Specimen:  Blood Updated:  04/07/18 1930     Troponin T <0.010 ng/mL     Narrative:       Troponin T Reference Ranges:  Less than 0.03 ng/mL:    Negative for AMI  0.03 to 0.09 ng/mL:      Indeterminant for AMI  Greater than 0.09 ng/mL: Positive for AMI    Magnesium [[id number]]  (Normal) Collected:  04/07/18 1858    Specimen:  Blood Updated:  04/07/18 1930     Magnesium 1.8 mg/dL     CBC & Differential [[id number]] Collected:  04/07/18 1858    Specimen:  Blood Updated:  04/07/18 1911    Narrative:       The following orders were created for panel order CBC & Differential.  Procedure                               Abnormality         Status                     ---------                               -----------         ------                     CBC Auto Differential[[id number]]        Abnormal            Final result                 Please view results for these tests on the individual orders.    CBC Auto Differential [[id number]]  (Abnormal) Collected:  04/07/18 1858    Specimen:  Blood Updated:  04/07/18 1911     WBC 8.38 10*3/mm3      RBC 4.43 10*6/mm3      Hemoglobin 14.5 g/dL      Hematocrit 42.6 %      MCV 96.2  fL      MCH 32.7 (H) pg      MCHC 34.0 g/dL      RDW 13.4 (H) %      RDW-SD 46.6 fl      MPV 11.6 fL      Platelets 104 (L) 10*3/mm3      Neutrophil % 89.2 (H) %      Lymphocyte % 6.9 (L) %      Monocyte % 3.5 (L) %      Eosinophil % 0.1 (L) %      Basophil % 0.1 %      Immature Grans % 0.2 %      Neutrophils, Absolute 7.47 10*3/mm3      Lymphocytes, Absolute 0.58 (L) 10*3/mm3      Monocytes, Absolute 0.29 10*3/mm3      Eosinophils, Absolute 0.01 10*3/mm3      Basophils, Absolute 0.01 10*3/mm3      Immature Grans, Absolute 0.02 10*3/mm3           Imaging Results (last 72 hours)     Procedure Component Value Units Date/Time    MRI Foot Right Without Contrast [[id number]] Resulted:  04/09/18 1709     Updated:  04/09/18 1610    MRI Foot Left With & Without Contrast [[id number]] Collected:  04/09/18 1011     Updated:  04/09/18 1507    Narrative:       LEFT MID AND FOREFOOT MRI WITH AND WITHOUT CONTRAST     HISTORY: Sepsis with cellulitis and swelling at the 3rd toe.     TECHNIQUE: MRI of the left mid and forefoot was performed with and  without IV contrast. There is no previous exam for comparison.     FINDINGS: Marrow signal throughout the mid and forefoot is normal.  Specifically, there is no marrow signal abnormality of the 3rd toe.  There is soft tissue edema throughout the mid and forefoot with soft  tissue enhancement along the 3rd toe. No fluid collection is observed at  the 3rd toe. The interphalangeal joints and the metatarsophalangeal  joints demonstrate normal amount of fluid and no abnormal contrast  enhancement.     There is some degenerative change at the 1st and 2nd tarsometatarsal  joints with a small volume of fluid around the plantar edge of the 1st  tarsometatarsal joint. There is also a small volume of fluid in the  flexor hallucis longus tendon sheath at the same level. Some soft tissue  edema and enhancement is observed along the medial plantar surface of  the foot in the same region as the small  fluid collections. There is no  marrow signal abnormality around the 1st metatarsophalangeal joint,  however. No other fluid collection is identified in the mid or forefoot.  The flexor and extensor tendons appear intact.       Impression:       Cellulitis at the 3rd toe without evidence of osteomyelitis  or septic arthritis.     There is some fluid in the flexor hallucis longus tendon sheath and  plantar to the 1st metatarsophalangeal joint, favored to be arising from  that joint, with some soft tissue enhancement along the plantar surface  of the foot in the same region. Given the clinical scenario, it is  possible that these changes are infectious. There is no evidence of  osteomyelitis around the visualized midfoot. The entire hindfoot is not  included in this exam.     This report was finalized on 4/9/2018 3:04 PM by Dr. Nagi Avalos MD.       XR Shoulder 2+ View Right [[id number]] Collected:  04/08/18 1634     Updated:  04/08/18 1946    Narrative:       RIGHT SHOULDER 2 VIEWS 04/08/2018      HISTORY: Right shoulder pain.     FINDINGS: There is some minimal hypertrophic change of the inferior  glenoid. No fractures or dislocations are seen. AC joint appears intact.       Impression:       1. No acute process.  2. Minimal hypertrophic change in the inferior glenoid.     This report was finalized on 4/8/2018 7:43 PM by Dr. Tim Gunderson MD.       MRI Brain Without Contrast [[id number]] Collected:  04/08/18 1933     Updated:  04/08/18 1933    Narrative:       BRAIN MRI WITHOUT GADOLINIUM     HISTORY: Confusion/delirium, altered LOC, unexplained.     COMPARISON: None.     FINDINGS:  Multiplanar images of the head were obtained without the use  of intravenous contrast.     There is a mild degree of generalized atrophy. There are a few scattered  foci of increased T2 and FLAIR signal abnormality in the white matter  bilaterally most likely sequelae of mild/early ischemic gliotic change.  No foci of restricted  diffusion are demonstrated on diffusion weighted  images (DWI) to suggest acute ischemia. Remainder of the brain  parenchyma is unremarkable. The ventricular system is normal in caliber  and configuration for age. Midline structures corpus callosum, pituitary  gland, and brainstem are unremarkable. There is no evidence of extra  axial mass or fluid collection. The major vascular flow voids are  patent. The orbital and encompassed paranasal soft tissues are  unremarkable. Osseous marrow signal intensity is within normal limits.     Preliminary interpretation telephoned directly to Dr. Marquez during  interpretation at 7:19 PM.                Impression:       1. Very mild atrophy and chronic appearing ischemic gliotic changes, no  acute intracranial finding          CT Abdomen Pelvis Without Contrast [[id number]] Collected:  04/07/18 2021     Updated:  04/07/18 2028    Narrative:       NONCONTRAST CT SCANS ABDOMEN AND PELVIS     HISTORY: ab pain     COMPARISON: None.     TECHNIQUE:Radiation dose reduction techniques were utilized, including  automated exposure control and exposure modulation based on body size.   Axial images were obtained from the lung bases to the symphysis pubis  without oral or IV contrast per request.      FINDINGS ABDOMEN CT:  There is motion and body habitus related artifact.  Within these limitations, the solid organs appear grossly unremarkable.  Aorta nonaneurysmal. No ascites. Numerous colonic diverticuli, normal  appendix.     FINDINGS PELVIS CT:  The GI tract not opacified for assessment but non  obstructive in appearance. Abundant fecal material suggests  constipation. Bone windows demonstrate nondisplaced fractures of the  right L1 and L2 transverse processes.                Impression:       1.  Colonic diverticulosis, constipation.  2. Nondisplaced right transverse process fractures of L1 and L2.  3. Grossly unremarkable noncontrast exam otherwise considering artifact.     This report  was finalized on 4/7/2018 8:25 PM by Gian Vance MD.       CT Cervical Spine Without Contrast [[id number]] Collected:  04/07/18 2017     Updated:  04/07/18 2024    Narrative:       NONCONTRAST CT SCAN CERVICAL SPINE     CLINICAL HISTORY: head injury     COMPARISON: None.     TECHNIQUE: Radiation dose reduction techniques were utilized, including  automated exposure control and exposure modulation based on body size.  Axial noncontrast images of the cervical spine were obtained without  contrast. Sagittal reformatted images were supplemented.     FINDINGS:  No acute vertebral fracture identified on the axial series.  There are extensive postsurgical changes. Partial corpectomy defect of  the C4 vertebra is noted along the more central and right lateral  aspect. A metallic plate traverses the C3-C6 vertebrae anteriorly with  bilateral fixation screws at C3, C5, and C6. There is some lucency about  both of the C3 fixation screw suggesting loosening. The C5 and C6  fixation screws appear to be well secured.     The vertebrae are well aligned and well maintained in height and stature  on the sagittal reformatted images.  No significant compression  deformity or retropulsion.     Degenerative changes are present. These are most pronounced in the mid  and lower cervical spine, particularly at C6-7 followed by C5-6.  Marginal osteophytes contribute to multilevel canal and foraminal  narrowing, again mostly in the mid and lower cervical levels. The facets  are well aligned. Mild multilevel facet arthropathy is present.                      Impression:       1. Postsurgical and degenerative changes as discussed, no acute osseous  finding     This report was finalized on 4/7/2018 8:20 PM by Gian Vance MD.       CT Head Without Contrast [[id number]] Collected:  04/07/18 2016     Updated:  04/07/18 2020    Narrative:       CRANIAL CT SCAN WITHOUT CONTRAST     CLINICAL HISTORY: head injury     COMPARISON: None.      TECHNIQUE: Radiation dose reduction techniques were utilized, including  automated exposure control and exposure modulation based on body size.  Multiple axial images of the head were obtained without contrast.      FINDINGS:  There are no abnormal areas of increased density or mass  effect.      Ventricles, sulci, and cisterns appear normal. Bone window  images are unremarkable.                Impression:       1. No acute intracranial abnormality.                     This study was performed with techniques to keep radiation doses as low  as reasonably achievable (ALARA). Individualized dose reduction  techniques using automated exposure control or adjustment of mA and/or  kV according to the patient size were employed.      This report was finalized on 4/7/2018 8:17 PM by Gian Vance MD.       XR Chest 1 View [[id number]] Collected:  04/07/18 2003     Updated:  04/07/18 2007    Narrative:       PORTABLE CHEST X-RAY     HISTORY: short of breath     COMPARISON: None.     FINDINGS: Portable AP view of the chest was obtained. Lungs are under  aerated but grossly  clear where visualized. Cardiac margins largely  obscured. Vascularity felt to be normal considering poor inspiration.    No obvious significant pleural fluid collections.              Impression:          Expiratory radiograph without gross active air space disease process     This report was finalized on 4/7/2018 8:04 PM by Gian Vance MD.             Assessment/Plan     Patient Active Problem List   Diagnosis   • Generalized weakness   Uncontrolled type 2 diabetes mellitus with hemoglobin A1c of 10.8%  Uncontrolled type 2 diabetes mellitus with diabetic peripheral neuropathy  Altered mental status  Hypothyroidism on medication  Patient is not clear about the dose I advised the daughter to go home to verify the dose so that she may be started on levothyroxine  Obesity     I discussed further management of patient's diabetes  I discussed the insulin  "management as well as oral medication  Patient cannot afford insulin pens at this point and wants a cheaper option    Patient recalls using Invokamet.  She tried metformin but which caused stomach issues  She does not recall the names of other medication    Patient's blood sugars are ranging from 200-400 currently  Patient is able to eat but apparently not eating much due to nausea  Will start a basal insulin Levemir 25 units daily at night  Will also keep her on NovoLog 5 units before each meal if the patient is eating consistently  I will also change the sliding scale NovoLog insulin    At the time of discharge the patient is still not cleared regarding her insurance status I will switch her to NPH and regular insulin regimen with or without oral medication for cheaper options  Patient verbalized understanding    The total time spent for old record and lab review and floor time was more than 110 min of which greater than 60 min of time ( greater than 50% of the total time )  was spent face to face with the patient counseling and coordination of care on recommended evaluation and treatment options, instructions for management/treatment and /or follow up  and importance of compliance with chosen management or treatment options    Mike Chin MD FACE.  04/09/18  5:10 PM        EMR Dragon / transcription disclaimer:     \"Dictated utilizing Dragon dictation\".   "

## 2018-04-09 NOTE — PROGRESS NOTES
LOS: 2 days   Patient Care Team:  No Known Provider as PCP - General    Chief Complaint: Strep bacteremia       Interval History: Her mental status is significantly improved from yesterday.  No cardiac complaints.      Objective   Vital Signs  Temp:  [99.3 °F (37.4 °C)-101.1 °F (38.4 °C)] 100.2 °F (37.9 °C)  Heart Rate:  [] 94  Resp:  [16-18] 16  BP: ()/(56-87) 107/60    Intake/Output Summary (Last 24 hours) at 04/09/18 0942  Last data filed at 04/09/18 0831   Gross per 24 hour   Intake           6420.4 ml   Output              200 ml   Net           6220.4 ml       Comfortable NAD  PERRL, conjunctiva clear  Neck supple, no JVD or thyromegaly appreciated  S1/S2 RRR, no m/r/g  Lungs CTA B, normal effort  Abdomen S/NT/ND (+) BS, no HSM appreciated  Extremities:  L toe ulcer  No visible or palpable skin lesions  A/Ox4, mood and affect appropriate    Results Review:        Results from last 7 days  Lab Units 04/09/18  0402 04/08/18  0059 04/07/18  1858   SODIUM mmol/L 139 139 130*   POTASSIUM mmol/L 3.4* 3.6 5.8*   CHLORIDE mmol/L 102 105 91*   CO2 mmol/L 20.0* 17.1* 24.2   BUN mg/dL 25* 22 30*   CREATININE mg/dL 0.93 0.92 1.30*   GLUCOSE mg/dL 205* 316* 437*   CALCIUM mg/dL 8.5* 6.9* 9.7       Results from last 7 days  Lab Units 04/09/18  0402 04/08/18  1604 04/08/18  1051  04/07/18  1858   CK TOTAL U/L  --   --   --   --  62   TROPONIN T ng/mL <0.010 <0.010 <0.010  < > <0.010   < > = values in this interval not displayed.    Results from last 7 days  Lab Units 04/09/18  0402 04/08/18  0059 04/07/18  1858   WBC 10*3/mm3 6.46 9.46 8.38   HEMOGLOBIN g/dL 11.5* 11.2* 14.5   HEMATOCRIT % 34.6* 34.9* 42.6   PLATELETS 10*3/mm3 92* 86* 104*               Results from last 7 days  Lab Units 04/07/18  1858   MAGNESIUM mg/dL 1.8           I reviewed the patient's new clinical results.  I personally viewed and interpreted the patient's EKG/Telemetry data        Medication Review:     ceftriaxone 2 g Intravenous  Q24H   insulin regular 0-14 Units Subcutaneous Q4H         sodium chloride 9 mL/hr Last Rate: 9 mL/hr (04/09/18 0821)       Assessment/Plan     Active Problems:    Generalized weakness    Strep bacteremia.  On abx.  Her encephalopathy has improved significantly.  No clear evidence of endocarditis.  I would hold off on JAYE unless she shows signs of worsening.    Harpreet Dorantes MD  04/09/18  9:42 AM

## 2018-04-09 NOTE — CONSULTS
Referring Provider: Carlos Eduardo Mcknight MD  [address] [address]    Reason for Consultation: positive blood cultures    History of present illness:  Ms Berry is a 61 YO who I am asked to evaluate and give opinion for positive blood cultures. History is obtained from the patient, MARITZA Horner, and review of the old medical records which I summarize/synthesize as follows: She lives in [place], [place] but is in town visiting her daughter. She presented to the ER on 4/7/18 with sharp shootling left arm pain without radiation or alleviating factors. She then had several falls including striking her head on a coffee table. She says she has been off and on her diabetes medicines lately and thinks this was a contributing factor. She was brought in by EMS and found to be febrile and tachycardic. She had many imaging tests which were negative inc CT head, CXR, and CT A/P. CT neck did not show any evidence of infection at neck hardware. She says her neck pain is above baseline due to the falls. She was started on empiric vancomycin and Zosyn. BCx are now positive for Strep in 1/2 sets so ID consulted. Cardiology performed a TTE which was negative for vegetations. It was also noted that she had left 3rd toe cellulitis so vascular surgery was consulted and recommended MRIs looking for osteomyelitis. She says she has never had a skin or foot infection before.    Past Medical History:   Diagnosis Date   • Arthritis    • Diabetes mellitus    • Gout    • Peripheral neuropathy      PSH:  Cervical spine surgery w/ hardware    Social History:  Lives in [place], [place]  Retired     Family History:  No 1st degree relatives w/ diabetes  Daughter is living and healthy    Allergies:  No Antibiotic Allergies    Medications:    Current Facility-Administered Medications:   •  acetaminophen (TYLENOL) suppository 650 mg, 650 mg, Rectal, Q4H PRN, Christian Mcknight MD, 650 mg at 04/08/18 2019  •  dextrose (D50W) solution  25 g, 25 g, Intravenous, Q15 Min PRN, Christian Mcknight MD  •  dextrose (GLUTOSE) oral gel 15 g, 15 g, Oral, Q15 Min PRN, Christian Mcknight MD  •  glucagon (human recombinant) (GLUCAGEN DIAGNOSTIC) injection 1 mg, 1 mg, Subcutaneous, PRN, Christian Mcknight MD  •  insulin regular (humuLIN R,novoLIN R) injection 0-14 Units, 0-14 Units, Subcutaneous, Q4H, Paul Key MD, 5 Units at 04/09/18 0442  •  lactated ringers infusion, 100 mL/hr, Intravenous, Continuous, Paul Key MD, Last Rate: 100 mL/hr at 04/08/18 0935, 100 mL/hr at 04/08/18 0935  •  morphine injection 2 mg, 2 mg, Intravenous, Q3H PRN, Paul Key MD, 2 mg at 04/08/18 1004  •  morphine injection 4 mg, 4 mg, Intravenous, Q4H PRN, Paul Key MD  •  ondansetron (ZOFRAN) injection 4 mg, 4 mg, Intravenous, Q4H PRN, Maria Luisa Acuna MD, 4 mg at 04/09/18 0524  •  Pharmacy to dose vancomycin, , Does not apply, Continuous PRN, Christian Mcknight MD  •  piperacillin-tazobactam (ZOSYN) 3.375 g in iso-osmotic dextrose 50 ml (premix), 3.375 g, Intravenous, Q8H, Christian Mcknight MD, 3.375 g at 04/09/18 0445  •  potassium chloride 10 mEq in 100 mL IVPB, 10 mEq, Intravenous, Q1H PRN, Maria Luisa Acuna MD, Last Rate: 100 mL/hr at 04/09/18 0541, 10 mEq at 04/09/18 0541  •  sodium chloride 0.9 % flush 1-10 mL, 1-10 mL, Intravenous, PRN, Christian Mcknight MD  •  sodium chloride 0.9 % flush 10 mL, 10 mL, Intravenous, PRN, Antolin Montero MD  •  sodium chloride 0.9 % infusion, 9 mL/hr, Intravenous, Continuous, Maria Luisa Acuna MD  •  vancomycin 1250 mg/250 mL 0.9% NS IVPB ([facility]), 12 mg/kg, Intravenous, Q12H, Christian Mcknight MD, 1,250 mg at 04/08/18 6627      Review of Systems  All systems were reviewed and are negative unless otherwise stated above in the HPI    Objective   Vital Signs   Temp:  [99.3 °F (37.4 °C)-102.3 °F (39.1 °C)] 99.3 °F (37.4 °C)  Heart Rate:  [100-122] 103  BP: ()/(56-87) 113/65    Physical Exam:   General: awake though  fatigued  Head: Normocephalic, atraumatic  Eyes: PERRL, EOMI, no scleral icterus, no conjunctival pallor, no conjunctival hemorrhages.   ENT: MM dry, OP clear, no thrush. Fair dentition.   Neck: Supple, no visible thyromegaly  Cardiovascular: mild tachycardia, RR, no murmurs, rubs, or gallops; no LE edema  Respiratory: Lungs are clear to ascultation bilaterally, no rales or wheezing; normal work of breathing on ambient air  GI: Abdomen is soft, non-tender, non-distended, normal bowel sounds in all four quadrants; no hepatosplenomegaly, no masses palpated  : no Biggs catheter present  Musculoskeletal: Left foot erythematous and edematous; 3rd toe specifically with shallow ulcer; no other joint abnormalities, normal musculature  Skin: No rashes, lesions, or embolic phenomenon  Neurological: Alert and oriented x 3, cranial nerves 2-12 grossly intact, motor strength 5/5 in all four extremities  Psychiatric: Normal mood and affect   Lymph: no pre-auricular, post-auricular, submandibular, cervical, supraclavicular  LAD  Vasc: no cyanosis; PIV w/o erythema    Labs:     Lab Results   Component Value Date    WBC 6.46 04/09/2018    HGB 11.5 (L) 04/09/2018    HCT 34.6 (L) 04/09/2018    MCV 96.4 04/09/2018    PLT 92 (L) 04/09/2018       Lab Results   Component Value Date    GLUCOSE 205 (H) 04/09/2018    BUN 25 (H) 04/09/2018    CREATININE 0.93 04/09/2018    EGFRIFNONA 61 04/09/2018    EGFRIFAFRI 50 (L) 04/07/2018    BCR 26.9 (H) 04/09/2018    CO2 20.0 (L) 04/09/2018    CALCIUM 8.5 (L) 04/09/2018    ALBUMIN 3.00 (L) 04/09/2018    LABIL2 0.9 04/09/2018    AST 44 (H) 04/09/2018    ALT 42 (H) 04/09/2018   No results found for: YESICA, ENRIQUETA, ROSELIA    Lab Results   Component Value Date    HGBA1C 10.80 (H) 04/08/2018     Lab Results   Component Value Date    CRP 5.43 (H) 04/07/2018     Troponin negative  Procal 1.08    Microbiology:  4/7 BCx: Strep species in 1/2 sets  4/7 RVP: negative    Radiology (personally  reviewed images/report):  MRI brain negative for stroke  CT neck negative for fluid collection at hardware site  CT A/P negative for acute process  CXR negative for pneumonia      Assessment/Plan   1. Sepsis due to Streptococcal septicemia secondary to left foot cellulitis secondary to diabetic foot ulcer  -stop vancomycin and Zosyn  -start ceftriaxone 2 g IV q24h  -f/u MRI foot; vascular surgery following  -noted negative TTE; low suspicion for endocarditis at this point  -repeat BCx today to document clearance  -I anticipate a 2 week course of antibiotics  -supportive ICU care    2. Uncontrolled DM2  -poor compliance as an outpatient she says  -encouraged stricter control to promote healing and decrease risk of infection    Thank you for this consult. ID will follow. I discussed my antibiotic plan with CCU PharmD.

## 2018-04-10 LAB
ANION GAP SERPL CALCULATED.3IONS-SCNC: 11.4 MMOL/L
B BURGDOR IGG+IGM SER-ACNC: <0.91 ISR (ref 0–0.9)
B BURGDOR IGM SER-ACNC: <0.8 INDEX (ref 0–0.79)
BACTERIA SPEC AEROBE CULT: ABNORMAL
BACTERIA SPEC AEROBE CULT: ABNORMAL
BUN BLD-MCNC: 27 MG/DL (ref 8–23)
BUN/CREAT SERPL: 34.6 (ref 7–25)
CALCIUM SPEC-SCNC: 8.4 MG/DL (ref 8.6–10.5)
CHLORIDE SERPL-SCNC: 101 MMOL/L (ref 98–107)
CO2 SERPL-SCNC: 22.6 MMOL/L (ref 22–29)
CREAT BLD-MCNC: 0.78 MG/DL (ref 0.57–1)
DEPRECATED RDW RBC AUTO: 47.8 FL (ref 37–54)
ERYTHROCYTE [DISTWIDTH] IN BLOOD BY AUTOMATED COUNT: 13.5 % (ref 11.7–13)
GFR SERPL CREATININE-BSD FRML MDRD: 75 ML/MIN/1.73
GLUCOSE BLD-MCNC: 190 MG/DL (ref 65–99)
GLUCOSE BLDC GLUCOMTR-MCNC: 187 MG/DL (ref 70–130)
GLUCOSE BLDC GLUCOMTR-MCNC: 209 MG/DL (ref 70–130)
GLUCOSE BLDC GLUCOMTR-MCNC: 214 MG/DL (ref 70–130)
GLUCOSE BLDC GLUCOMTR-MCNC: 221 MG/DL (ref 70–130)
GRAM STN SPEC: ABNORMAL
GRAM STN SPEC: ABNORMAL
HCT VFR BLD AUTO: 33.4 % (ref 35.6–45.5)
HGB BLD-MCNC: 10.9 G/DL (ref 11.9–15.5)
ISOLATED FROM: ABNORMAL
MCH RBC QN AUTO: 31.7 PG (ref 26.9–32)
MCHC RBC AUTO-ENTMCNC: 32.6 G/DL (ref 32.4–36.3)
MCV RBC AUTO: 97.1 FL (ref 80.5–98.2)
PLATELET # BLD AUTO: 83 10*3/MM3 (ref 140–500)
PMV BLD AUTO: 11.2 FL (ref 6–12)
POTASSIUM BLD-SCNC: 3.3 MMOL/L (ref 3.5–5.2)
RBC # BLD AUTO: 3.44 10*6/MM3 (ref 3.9–5.2)
SODIUM BLD-SCNC: 135 MMOL/L (ref 136–145)
STREP GROUPING: ABNORMAL
WBC NRBC COR # BLD: 6.23 10*3/MM3 (ref 4.5–10.7)

## 2018-04-10 PROCEDURE — 85027 COMPLETE CBC AUTOMATED: CPT | Performed by: INTERNAL MEDICINE

## 2018-04-10 PROCEDURE — 99232 SBSQ HOSP IP/OBS MODERATE 35: CPT | Performed by: INTERNAL MEDICINE

## 2018-04-10 PROCEDURE — 63710000001 INSULIN ASPART PER 5 UNITS: Performed by: INTERNAL MEDICINE

## 2018-04-10 PROCEDURE — 63710000001 INSULIN DETEMER PER 5 UNITS: Performed by: INTERNAL MEDICINE

## 2018-04-10 PROCEDURE — 80048 BASIC METABOLIC PNL TOTAL CA: CPT | Performed by: INTERNAL MEDICINE

## 2018-04-10 PROCEDURE — 25010000003 PENICILLIN G POTASSIUM PER 600000 UNITS: Performed by: INTERNAL MEDICINE

## 2018-04-10 PROCEDURE — 82962 GLUCOSE BLOOD TEST: CPT

## 2018-04-10 RX ORDER — POTASSIUM CHLORIDE 1.5 G/1.77G
40 POWDER, FOR SOLUTION ORAL AS NEEDED
Status: DISCONTINUED | OUTPATIENT
Start: 2018-04-10 | End: 2018-04-17 | Stop reason: HOSPADM

## 2018-04-10 RX ORDER — POTASSIUM CHLORIDE 750 MG/1
40 CAPSULE, EXTENDED RELEASE ORAL AS NEEDED
Status: DISCONTINUED | OUTPATIENT
Start: 2018-04-10 | End: 2018-04-17 | Stop reason: HOSPADM

## 2018-04-10 RX ORDER — POTASSIUM CHLORIDE 7.45 MG/ML
10 INJECTION INTRAVENOUS
Status: DISCONTINUED | OUTPATIENT
Start: 2018-04-10 | End: 2018-04-17 | Stop reason: HOSPADM

## 2018-04-10 RX ORDER — ACETAMINOPHEN 325 MG/1
650 TABLET ORAL EVERY 6 HOURS PRN
Status: DISCONTINUED | OUTPATIENT
Start: 2018-04-10 | End: 2018-04-17 | Stop reason: HOSPADM

## 2018-04-10 RX ADMIN — SODIUM CHLORIDE 4 MILLION UNITS: 9 INJECTION, SOLUTION INTRAVENOUS at 20:24

## 2018-04-10 RX ADMIN — INSULIN ASPART 5 UNITS: 100 INJECTION, SOLUTION INTRAVENOUS; SUBCUTANEOUS at 17:54

## 2018-04-10 RX ADMIN — GABAPENTIN 600 MG: 300 CAPSULE ORAL at 06:40

## 2018-04-10 RX ADMIN — POTASSIUM CHLORIDE 40 MEQ: 750 CAPSULE, EXTENDED RELEASE ORAL at 09:20

## 2018-04-10 RX ADMIN — LEVOTHYROXINE SODIUM 137 MCG: 137 TABLET ORAL at 06:40

## 2018-04-10 RX ADMIN — INSULIN ASPART 5 UNITS: 100 INJECTION, SOLUTION INTRAVENOUS; SUBCUTANEOUS at 12:24

## 2018-04-10 RX ADMIN — GABAPENTIN 600 MG: 300 CAPSULE ORAL at 21:56

## 2018-04-10 RX ADMIN — INSULIN ASPART 5 UNITS: 100 INJECTION, SOLUTION INTRAVENOUS; SUBCUTANEOUS at 09:29

## 2018-04-10 RX ADMIN — ACETAMINOPHEN 650 MG: 325 TABLET ORAL at 21:56

## 2018-04-10 RX ADMIN — POTASSIUM CHLORIDE 40 MEQ: 750 CAPSULE, EXTENDED RELEASE ORAL at 14:09

## 2018-04-10 RX ADMIN — INSULIN DETEMIR 25 UNITS: 100 INJECTION, SOLUTION SUBCUTANEOUS at 22:48

## 2018-04-10 RX ADMIN — INSULIN ASPART 3 UNITS: 100 INJECTION, SOLUTION INTRAVENOUS; SUBCUTANEOUS at 17:54

## 2018-04-10 RX ADMIN — SODIUM CHLORIDE 4 MILLION UNITS: 9 INJECTION, SOLUTION INTRAVENOUS at 12:26

## 2018-04-10 RX ADMIN — INSULIN ASPART 3 UNITS: 100 INJECTION, SOLUTION INTRAVENOUS; SUBCUTANEOUS at 12:25

## 2018-04-10 RX ADMIN — INSULIN ASPART 3 UNITS: 100 INJECTION, SOLUTION INTRAVENOUS; SUBCUTANEOUS at 22:48

## 2018-04-10 RX ADMIN — SODIUM CHLORIDE 4 MILLION UNITS: 9 INJECTION, SOLUTION INTRAVENOUS at 16:26

## 2018-04-10 RX ADMIN — INSULIN ASPART 2 UNITS: 100 INJECTION, SOLUTION INTRAVENOUS; SUBCUTANEOUS at 09:20

## 2018-04-10 RX ADMIN — GABAPENTIN 600 MG: 300 CAPSULE ORAL at 14:09

## 2018-04-10 NOTE — PROGRESS NOTES
Continued Stay Note  [facility]     Patient Name: Elisabet Berry  MRN: [id number]  Today's Date: 4/10/2018    Admit Date: 4/7/2018          Discharge Plan     Row Name 04/10/18 1614       Plan    Plan Comments Called [facility] to check price of Levimir insulin which is $334.59 per vial and has a $75 off coupon.  Called [facility] retail pharmacy and the cost per vial is $145.03.  Novolog 70/30 is $24.99 per vial at [facility].  Will jayy to follow to assist with meds..              Discharge Codes    No documentation.           Cata Burns RN

## 2018-04-10 NOTE — PROGRESS NOTES
vasc fu:  SSTI left 3rd toe  No osteo    LWC:  Wash feet bid  Betadine paint toe bid    She would benefit from diabetic foot wear but doubt she will make the investment    See as needed  Follow up with Podiatry at home    No vascular  insufficiency

## 2018-04-10 NOTE — PLAN OF CARE
Problem: Patient Care Overview  Goal: Plan of Care Review  Outcome: Ongoing (interventions implemented as appropriate)   04/10/18 3844   Coping/Psychosocial   Plan of Care Reviewed With patient   Plan of Care Review   Progress no change   OTHER   Outcome Summary Pt up with two assist. complains of right shoulder pain. denies needs of meds. wash foot bid and paint with betadine.        Problem: Skin Injury Risk (Adult)  Goal: Skin Health and Integrity  Outcome: Ongoing (interventions implemented as appropriate)      Problem: Fall Risk (Adult)  Goal: Absence of Fall  Outcome: Ongoing (interventions implemented as appropriate)      Problem: Pain, Acute (Adult)  Goal: Acceptable Pain Control/Comfort Level  Outcome: Ongoing (interventions implemented as appropriate)

## 2018-04-10 NOTE — PLAN OF CARE
Problem: Patient Care Overview  Goal: Plan of Care Review  Outcome: Ongoing (interventions implemented as appropriate)   04/09/18 1802 04/09/18 2247 04/10/18 0311   Coping/Psychosocial   Plan of Care Reviewed With --  patient --    Plan of Care Review   Progress improving --  --    OTHER   Outcome Summary --  --  Pt. transferred from CCU at beginning of shift. No neuro deficits noted. Pain noted to R shoulder. Tolerating medication regimen. PT to see in am.      Goal: Individualization and Mutuality  Outcome: Ongoing (interventions implemented as appropriate)    Goal: Discharge Needs Assessment  Outcome: Ongoing (interventions implemented as appropriate)      Problem: Skin Injury Risk (Adult)  Goal: Skin Health and Integrity  Outcome: Ongoing (interventions implemented as appropriate)      Problem: Fall Risk (Adult)  Goal: Absence of Fall  Outcome: Ongoing (interventions implemented as appropriate)      Problem: Sepsis/Septic Shock (Adult)  Goal: Signs and Symptoms of Listed Potential Problems Will be Absent, Minimized or Managed (Sepsis/Septic Shock)  Outcome: Ongoing (interventions implemented as appropriate)      Problem: Pain, Acute (Adult)  Goal: Acceptable Pain Control/Comfort Level  Outcome: Ongoing (interventions implemented as appropriate)

## 2018-04-10 NOTE — PROGRESS NOTES
PROGRESS NOTE  Patient Name: Elisabet Berry  Age/Sex: 62 y.o. female  : 1955  MRN: [id number]    Date of Admission: 2018  Date of Encounter Visit: 04/10/18   LOS: 3 days   Patient Care Team:  Christine Phelps DO as PCP - General (Family Medicine)    Chief Complaint: Syncope and pain    Interval History: Patient came in with syncope with no evidence of stroke on her workup. she does have cellulitis of her left lower extremity and is being followed by infectious disease and vascular surgery.  Patient did have sepsis due to strep group C septicemia secondary to left foot cellulitis from diabetic foot ulcer.  MRI was negative for deep infection, but there were some tendon changes with no fluid collection or other sign of osteomyelitis.  Initially treated with Rocephin 2 g, but was switched to penicillin 4 million units every 4 hours on 4/10/18 with plan to transition to Levaquin 750 mg by mouth every 24 hours until 18.  Repeat blood cultures have been negative.  She was complaining of shoulder and leg pain and her home Neurontin was continued as well as when necessary narcotic.  She has a history of diabetes and has been relying on samples for various drugs as outpatient.  Endocrinology was consulted and she was treated with insulin therapy and will consider affordable options at discharge given that she may not have insurance at this time.      REVIEW OF SYSTEMS:   CARDIOVASCULAR: No chest pain, chest pressure or chest discomfort. No palpitations , positive edema.   RESPIRATORY: No shortness of breath, cough or sputum.   GASTROINTESTINAL: No anorexia, nausea, vomiting or diarrhea. No abdominal pain or blood.   HEMATOLOGIC: No bleeding or bruising.     Ventilator/Non-Invasive Ventilation Settings     None            Vital Signs  Temp:  [98 °F (36.7 °C)-100.2 °F (37.9 °C)] 98 °F (36.7 °C)  Heart Rate:  [82-96] 87  Resp:  [15-19] 16  BP: (110-148)/(63-82) 120/76  SpO2:  [90 %-97 %] 97 %  on    Device  "(Oxygen Therapy): room air    Intake/Output Summary (Last 24 hours) at 04/10/18 1852  Last data filed at 04/09/18 2100   Gross per 24 hour   Intake                0 ml   Output              300 ml   Net             -300 ml     Flowsheet Rows    Flowsheet Row First Filed Value   Admission Height 167.6 cm (66\") Documented at 04/07/2018 1854   Admission Weight 102 kg (225 lb) Documented at 04/07/2018 1854        Body mass index is 37.08 kg/m².  1    04/07/18  1854 04/08/18  0100 04/09/18  0851   Weight: 102 kg (225 lb) 107 kg (236 lb 12.4 oz) 107 kg (236 lb 12.4 oz)       Physical Exam:  GEN:  No acute distress, alert, cooperative, well developed, On room air  EYES:   Sclera clear. No icterus. PERRL.  ENT:   External ears/nose normal, no oral lesions, no thrush, mucous membranes moist  NECK:  Supple, midline trachea  LUNGS: Normal chest on inspection, CTAB with diminished bases, no wheezes. No rhonchi. No crackles. Respirations regular, even and unlabored.   CV:  Regular rhythm and rate. Normal S1/S2. No murmurs, gallops, or rubs noted.  ABD:  Soft, non-tender and non-distended. Normal bowel sounds. No guarding  EXT:  Moves all extremities well. No cyanosis. No redness.  Positive left lower extremity asymmetric edema.   Skin: Cellulitis around the second and the third toe on the left with some swelling and edema of that right lower extremity, no bleeding, she does have an ulcer at the tip of the toe    Results Review:        Results from last 7 days  Lab Units 04/10/18  0520 04/09/18  0402 04/08/18  0059 04/07/18  1858   SODIUM mmol/L 135* 139 139 130*   POTASSIUM mmol/L 3.3* 3.4* 3.6 5.8*   CHLORIDE mmol/L 101 102 105 91*   CO2 mmol/L 22.6 20.0* 17.1* 24.2   BUN mg/dL 27* 25* 22 30*   CREATININE mg/dL 0.78 0.93 0.92 1.30*   CALCIUM mg/dL 8.4* 8.5* 6.9* 9.7   AST (SGOT) U/L  --  44* 30 48*   ALT (SGPT) U/L  --  42* 39* 63*   ANION GAP mmol/L 11.4 17.0 16.9 14.8   ALBUMIN g/dL  --  3.00* 2.80* 4.30       Results from " last 7 days  Lab Units 04/09/18  0402 04/08/18  1604 04/08/18  1051  04/07/18  1858   CK TOTAL U/L  --   --   --   --  62   TROPONIN T ng/mL <0.010 <0.010 <0.010  < > <0.010   < > = values in this interval not displayed.            Results from last 7 days  Lab Units 04/10/18  0520 04/09/18  1158 04/09/18  0402 04/08/18  0059 04/07/18  1858   WBC 10*3/mm3 6.23  --  6.46 9.46 8.38   HEMOGLOBIN g/dL 10.9*  --  11.5* 11.2* 14.5   HEMATOCRIT % 33.4*  --  34.6* 34.9* 42.6   PLATELETS 10*3/mm3 83* 75* 92* 86* 104*   MCV fL 97.1  --  96.4 99.4* 96.2   NEUTROPHIL % %  --   --   --  88.5* 89.2*   LYMPHOCYTE % %  --   --   --  7.1* 6.9*   MONOCYTES % %  --   --   --  4.0* 3.5*   EOSINOPHIL % %  --   --   --  0.0* 0.1*   BASOPHIL % %  --   --   --  0.1 0.1   IMM GRAN % %  --   --   --  0.3 0.2           Results from last 7 days  Lab Units 04/07/18  1858   MAGNESIUM mg/dL 1.8           Invalid input(s): LDLCALC    Results from last 7 days  Lab Units 04/08/18  1606 04/08/18  0015   PH, ARTERIAL pH units 7.469* 7.453*   PCO2, ARTERIAL mm Hg 22.9* 26.6*   PO2 ART mm Hg 71.2* 75.1*   HCO3 ART mmol/L 16.6* 18.6*       Results from last 7 days  Lab Units 04/08/18  0059   HEMOGLOBIN A1C % 10.80*     Glucose   Date/Time Value Ref Range Status   04/10/2018 1613 214 (H) 70 - 130 mg/dL Final   04/10/2018 1124 209 (H) 70 - 130 mg/dL Final   04/10/2018 0603 187 (H) 70 - 130 mg/dL Final   04/09/2018 2047 208 (H) 70 - 130 mg/dL Final   04/09/2018 1639 179 (H) 70 - 130 mg/dL Final   04/09/2018 1123 254 (H) 70 - 130 mg/dL Final   04/09/2018 0717 192 (H) 70 - 130 mg/dL Final   04/09/2018 0250 201 (H) 70 - 130 mg/dL Final       Results from last 7 days  Lab Units 04/08/18  1604 04/08/18  0216 04/07/18  2214 04/07/18  1858   PROCALCITONIN ng/mL  --   --   --  1.08*   LACTATE mmol/L 1.5 2.4* 3.6*  --        Results from last 7 days  Lab Units 04/09/18  0930 04/09/18  0848 04/07/18  2250 04/07/18  2213   BLOODCX  No growth at 24 hours No growth at  24 hours   Streptococcus, Beta Hemolytic, Group C* No growth at 2 days   GRAM STAIN RESULT   --   --  Anaerobic Bottle Gram positive cocci in chains  Aerobic Bottle Gram positive cocci in chains  --    BCIDPCR   --   --  Streptococcus spp, not A, B, or pneumoniae. Identification by BCID PCR.*  --        Results from last 7 days  Lab Units 04/07/18  1942   NITRITE UA  Negative       Results from last 7 days  Lab Units 04/07/18  2217   ADENOVIRUS DETECTION BY PCR  Not Detected   CORONAVIRUS 229E  Not Detected   CORONAVIRUS HKU1  Not Detected   CORONAVIRUS NL63  Not Detected   CORONAVIRUS OC43  Not Detected   HUMAN METAPNEUMOVIRUS  Not Detected   HUMAN RHINOVIRUS/ENTEROVIRUS  Not Detected   INFLUENZA B PCR  Not Detected   PARAINFLUENZA 1  Not Detected   PARAINFLUENZA VIRUS 2  Not Detected   PARAINFLUENZA VIRUS 3  Not Detected   PARAINFLUENZA VIRUS 4  Not Detected   BORDETELLA PERTUSSIS PCR  Not Detected   CHLAMYDOPHILA PNEUMONIAE PCR  Not Detected   MYCOPLAMA PNEUMO PCR  Not Detected   INFLUENZA A PCR  Not Detected   INFLUENZA A H3  Not Detected   INFLUENZA A H1  Not Detected   RSV, PCR  Not Detected       Results from last 7 days  Lab Units 04/08/18  1604   URIC ACID mg/dL 3.1       Imaging:   Imaging Results (all)     Procedure Component Value Units Date/Time    MRI Foot Left With & Without Contrast [[id number]] Collected:  04/09/18 1011     Updated:  04/09/18 1011    Narrative:       LEFT MID AND FOREFOOT MRI WITH AND WITHOUT CONTRAST     HISTORY: Sepsis with cellulitis and swelling at the 3rd toe.     TECHNIQUE: MRI of the left mid and forefoot was performed with and  without IV contrast. There is no previous exam for comparison.     FINDINGS: Marrow signal throughout the mid and forefoot is normal.  Specifically, there is no marrow signal abnormality of the 3rd toe.  There is soft tissue edema throughout the mid and forefoot with soft  tissue enhancement along the 3rd toe. No fluid collection is observed at  the  3rd toe. The interphalangeal joints and the metatarsophalangeal  joints demonstrate normal amount of fluid and no abnormal contrast  enhancement.     There is some degenerative change at the 1st and 2nd tarsometatarsal  joints with a small volume of fluid around the plantar edge of the 1st  tarsometatarsal joint. There is also a small volume of fluid in the  flexor hallucis longus tendon sheath at the same level. Some soft tissue  edema and enhancement is observed along the medial plantar surface of  the foot in the same region as the small fluid collections. There is no  marrow signal abnormality around the 1st metatarsophalangeal joint,  however. No other fluid collection is identified in the mid or forefoot.  The flexor and extensor tendons appear intact.       Impression:       Cellulitis at the 3rd toe without evidence of osteomyelitis  or septic arthritis.     There is some fluid in the flexor hallucis longus tendon sheath and  plantar to the 1st metatarsophalangeal joint, favored to be arising from  that joint, with some soft tissue enhancement along the plantar surface  of the foot in the same region. Given the clinical scenario, it is  possible that these changes are infectious. There is no evidence of  osteomyelitis around the visualized midfoot. The entire hindfoot is not  included in this exam.             I reviewed the patient's new clinical results.  I personally viewed and interpreted the patient's imaging results:Chest x-ray showed no acute disease, MRI films findings per radiologist        Medication Review:     gabapentin 600 mg Oral Q8H   gadobenate dimeglumine 20 mL Intravenous Once in imaging   insulin aspart 2-5 Units Subcutaneous 4x Daily AC & at Bedtime   insulin aspart 5 Units Subcutaneous TID With Meals   insulin detemir 25 Units Subcutaneous Nightly   levothyroxine 137 mcg Oral Q AM   penicillin g (potassium) 4 Million Units Intravenous Q4H         sodium chloride 9 mL/hr Last Rate: 9  mL/hr (04/09/18 0821)       ASSESSMENT:   1. Sepsis with streptococcal C infection from left foot cellulitis  2. Diabetic left foot ulcer  3. Uncontrolled diabetes mellitus  4. Syncope, negative cardiac/neurology workup  5. History of gout  6. Lactic acidosis on presentation  7. Thrombocytopenia  8. Hypokalemia  9. Hyponatremia    PLAN:  Vascular surgery and infectious disease on board for the foot ulcer evaluation and treatment recommendation.   Continue with IV penicillin until time of discharge and then transition to Levaquin 750 mg daily until 4/22/18 as recommended by infectious disease.  Patient likely benefit from home health care or follow-up with wound clinic or podiatry.  She was encouraged to get diabetic shoes  Appreciate endocrinology help and will determine discharge needs.   Continue home Neurontin and when necessary narcotic.  Continue to monitor electrolytes.  Platelets have improved today and will continue to monitor.  Discussed with patient, family and with the staff    Disposition: Consider discharge in next 1-2 days depending on overall status  According to patient and family the insurance company stated that her  has insurance, but she does not and she was supposed to be set up according to her 's employer.  There undergoing current investigation, but at this point she does not have insurance and needs help to afford her medications.  CCP did evaluation and 70/30 NovoLog may be the best option for affordability.  Will need follow-up with PCP, podiatry and endocrinology     Jerry Russo MD  04/10/18  6:52 PM    Dictated utilizing Dragon dictation:  Much of this encounter note is an electronic transcription/translation of spoken language to printed text. The electronic translation of spoken language may permit erroneous, or at times, nonsensical words or phrases to be inadvertently transcribed; Although I have reviewed the note for such errors, some may still exist.

## 2018-04-10 NOTE — PROGRESS NOTES
LOS: 3 days     Chief Complaint:  Follow-up Group C Strep septicemia    Interval History:  No acute events. Moved out of ICU to . Tolerating antitbiotics w/o rash or diarrhea. MRI negative for osteomyelitis. She denies foot pain. WBC normalized.    ROS: no n/v/d    Vital Signs  Temp:  [98.9 °F (37.2 °C)-100.2 °F (37.9 °C)] 99.2 °F (37.3 °C)  Heart Rate:  [76-96] 89  Resp:  [15-24] 19  BP: ()/(49-82) 112/74    Physical Exam:  General: awake, more alert today  Head: Normocephalic  Eyes:  no conjunctival pallor, no conjunctival hemorrhages.   ENT: MM dry, OP clear, no thrush. Fair dentition.   Neck: Supple  Cardiovascular: NR, RR, no murmurs, rubs, or gallops; no LE edema  Respiratory: normal work of breathing on ambient air  GI: Abdomen is soft, non-tender, non-distended  : no Biggs catheter present  Musculoskeletal: Left foot less erythematous and edematous; 3rd toe specifically with shallow ulcer; no other joint abnormalities, normal musculature  Skin: No rashes, lesions, or embolic phenomenon  Neurological: Alert and oriented x 3,motor strength 5/5 in all four extremities  Psychiatric: Normal mood and affect   Vasc: no cyanosis; PIV w/o erythema    Antibiotics:  •  penicillin G potassium 4 Million Units in sodium chloride 0.9 % 100 mL IVPB, 4 Million Units, Intravenous, Q4H, Kalyan Deleon MD    LABS:  CBC, CRP, Crt, micro reviewed today  Lab Results   Component Value Date    WBC 6.23 04/10/2018    HGB 10.9 (L) 04/10/2018    HCT 33.4 (L) 04/10/2018    MCV 97.1 04/10/2018    PLT 83 (L) 04/10/2018     Lab Results   Component Value Date    GLUCOSE 190 (H) 04/10/2018    BUN 27 (H) 04/10/2018    CREATININE 0.78 04/10/2018    EGFRIFNONA 75 04/10/2018    EGFRIFAFRI 50 (L) 04/07/2018    BCR 34.6 (H) 04/10/2018    CO2 22.6 04/10/2018    CALCIUM 8.4 (L) 04/10/2018    ALBUMIN 3.00 (L) 04/09/2018    LABIL2 0.9 04/09/2018    AST 44 (H) 04/09/2018    ALT 42 (H) 04/09/2018    CRP 5.43 (H) 04/07/2018      Microbiology:  4/7 BCx: Strep group C species in 1/2 sets (sensitive to PCN, CTX, and LVQ)  4/7 RVP: negative  4/9 BCx: NGTD    Radiology (personally reviewed report):   MRI with some inflammation at tendon but no osteomyelitis of fluid collection    Assessment/Plan   1. Sepsis due to Strep group C septicemia secondary to left foot cellulitis secondary to diabetic foot ulcer  -MRI negative for deep infection; some tendon changes but no fluid collections  -stop ceftriaxone 2 g IV q24h  -start pCN G 4 million units q4h; will use this while in the hospital then change to  mg PO q24h through 4/22/18 at time of discharge  -recommended outpatient wound care closer to home after discharge  -repeat BCx NGTD  -does not need a JAYE from my standpoint     2. Uncontrolled DM2  -poor compliance as an outpatient she says  -encouraged stricter control to promote healing and decrease risk of infection    Thank you for this consult. ID will follow.

## 2018-04-10 NOTE — PROGRESS NOTES
Continued Stay Note  [facility]     Patient Name: Elisabet Berry  MRN: [id number]  Today's Date: 4/10/2018    Admit Date: 4/7/2018          Discharge Plan     Row Name 04/10/18 9427       Plan    Plan Comments Met with patient who states that  is checking to find out if she is on his insurance.      Row Name 04/10/18 2565       Plan    Plan Comments Called [facility] to check price of Levimir insulin which is $334.59 and has a $75 off coupon.  Called [facility] retail pharmacy and the cost per vial is $145.03.  Novolog 70/30 is $24.99 per vial at [facility].  Will jayy to follow to assist with meds..              Discharge Codes    No documentation.           Cata Burns RN

## 2018-04-11 LAB
ANION GAP SERPL CALCULATED.3IONS-SCNC: 8.4 MMOL/L
B MICROTI IGG TITR SER: NORMAL {TITER}
B MICROTI IGM TITR SER: NORMAL {TITER}
BUN BLD-MCNC: 25 MG/DL (ref 8–23)
BUN/CREAT SERPL: 33.8 (ref 7–25)
CALCIUM SPEC-SCNC: 8.7 MG/DL (ref 8.6–10.5)
CHLORIDE SERPL-SCNC: 100 MMOL/L (ref 98–107)
CO2 SERPL-SCNC: 25.6 MMOL/L (ref 22–29)
CREAT BLD-MCNC: 0.74 MG/DL (ref 0.57–1)
DEPRECATED RDW RBC AUTO: 47.9 FL (ref 37–54)
ERYTHROCYTE [DISTWIDTH] IN BLOOD BY AUTOMATED COUNT: 13.4 % (ref 11.7–13)
GFR SERPL CREATININE-BSD FRML MDRD: 79 ML/MIN/1.73
GLUCOSE BLD-MCNC: 172 MG/DL (ref 65–99)
GLUCOSE BLDC GLUCOMTR-MCNC: 153 MG/DL (ref 70–130)
GLUCOSE BLDC GLUCOMTR-MCNC: 163 MG/DL (ref 70–130)
GLUCOSE BLDC GLUCOMTR-MCNC: 210 MG/DL (ref 70–130)
GLUCOSE BLDC GLUCOMTR-MCNC: 229 MG/DL (ref 70–130)
HCT VFR BLD AUTO: 33.6 % (ref 35.6–45.5)
HGB BLD-MCNC: 10.9 G/DL (ref 11.9–15.5)
MCH RBC QN AUTO: 31.7 PG (ref 26.9–32)
MCHC RBC AUTO-ENTMCNC: 32.4 G/DL (ref 32.4–36.3)
MCV RBC AUTO: 97.7 FL (ref 80.5–98.2)
PLATELET # BLD AUTO: 91 10*3/MM3 (ref 140–500)
PMV BLD AUTO: 10.9 FL (ref 6–12)
POTASSIUM BLD-SCNC: 3.7 MMOL/L (ref 3.5–5.2)
R RICKETTSI IGG SER QL IA: NEGATIVE
R RICKETTSI IGM TITR SER: 0.44 INDEX (ref 0–0.89)
RBC # BLD AUTO: 3.44 10*6/MM3 (ref 3.9–5.2)
SODIUM BLD-SCNC: 134 MMOL/L (ref 136–145)
WBC NRBC COR # BLD: 6.1 10*3/MM3 (ref 4.5–10.7)

## 2018-04-11 PROCEDURE — 99233 SBSQ HOSP IP/OBS HIGH 50: CPT | Performed by: INTERNAL MEDICINE

## 2018-04-11 PROCEDURE — 99232 SBSQ HOSP IP/OBS MODERATE 35: CPT | Performed by: INTERNAL MEDICINE

## 2018-04-11 PROCEDURE — 82962 GLUCOSE BLOOD TEST: CPT

## 2018-04-11 PROCEDURE — 80048 BASIC METABOLIC PNL TOTAL CA: CPT | Performed by: NURSE PRACTITIONER

## 2018-04-11 PROCEDURE — 97110 THERAPEUTIC EXERCISES: CPT

## 2018-04-11 PROCEDURE — 63710000001 INSULIN ASPART PER 5 UNITS: Performed by: INTERNAL MEDICINE

## 2018-04-11 PROCEDURE — 97162 PT EVAL MOD COMPLEX 30 MIN: CPT

## 2018-04-11 PROCEDURE — 63710000001 INSULIN ISOPHANE HUMAN PER 5 UNITS: Performed by: INTERNAL MEDICINE

## 2018-04-11 PROCEDURE — 25010000003 PENICILLIN G POTASSIUM PER 600000 UNITS: Performed by: INTERNAL MEDICINE

## 2018-04-11 PROCEDURE — 85027 COMPLETE CBC AUTOMATED: CPT | Performed by: INTERNAL MEDICINE

## 2018-04-11 RX ADMIN — GABAPENTIN 600 MG: 300 CAPSULE ORAL at 06:54

## 2018-04-11 RX ADMIN — SODIUM CHLORIDE 4 MILLION UNITS: 9 INJECTION, SOLUTION INTRAVENOUS at 20:59

## 2018-04-11 RX ADMIN — INSULIN ASPART 3 UNITS: 100 INJECTION, SOLUTION INTRAVENOUS; SUBCUTANEOUS at 17:43

## 2018-04-11 RX ADMIN — SODIUM CHLORIDE 4 MILLION UNITS: 9 INJECTION, SOLUTION INTRAVENOUS at 04:36

## 2018-04-11 RX ADMIN — INSULIN ASPART 3 UNITS: 100 INJECTION, SOLUTION INTRAVENOUS; SUBCUTANEOUS at 13:02

## 2018-04-11 RX ADMIN — INSULIN ASPART 2 UNITS: 100 INJECTION, SOLUTION INTRAVENOUS; SUBCUTANEOUS at 21:00

## 2018-04-11 RX ADMIN — ACETAMINOPHEN 650 MG: 325 TABLET ORAL at 21:09

## 2018-04-11 RX ADMIN — GABAPENTIN 600 MG: 300 CAPSULE ORAL at 13:02

## 2018-04-11 RX ADMIN — INSULIN ASPART 2 UNITS: 100 INJECTION, SOLUTION INTRAVENOUS; SUBCUTANEOUS at 08:39

## 2018-04-11 RX ADMIN — SODIUM CHLORIDE 4 MILLION UNITS: 9 INJECTION, SOLUTION INTRAVENOUS at 13:02

## 2018-04-11 RX ADMIN — SODIUM CHLORIDE 4 MILLION UNITS: 9 INJECTION, SOLUTION INTRAVENOUS at 17:43

## 2018-04-11 RX ADMIN — SODIUM CHLORIDE 4 MILLION UNITS: 9 INJECTION, SOLUTION INTRAVENOUS at 00:33

## 2018-04-11 RX ADMIN — SODIUM CHLORIDE 4 MILLION UNITS: 9 INJECTION, SOLUTION INTRAVENOUS at 08:39

## 2018-04-11 RX ADMIN — GABAPENTIN 600 MG: 300 CAPSULE ORAL at 21:00

## 2018-04-11 RX ADMIN — ACETAMINOPHEN 650 MG: 325 TABLET ORAL at 15:29

## 2018-04-11 RX ADMIN — ACETAMINOPHEN 650 MG: 325 TABLET ORAL at 08:39

## 2018-04-11 RX ADMIN — LEVOTHYROXINE SODIUM 137 MCG: 137 TABLET ORAL at 06:54

## 2018-04-11 RX ADMIN — HUMAN INSULIN 15 UNITS: 100 INJECTION, SUSPENSION SUBCUTANEOUS at 11:16

## 2018-04-11 RX ADMIN — HUMAN INSULIN 8 UNITS: 100 INJECTION, SOLUTION SUBCUTANEOUS at 17:43

## 2018-04-11 NOTE — PROGRESS NOTES
LOS: 4 days   Patient Care Team:  Christine Phelps DO as PCP - General (Family Medicine)    Chief Complaint: Strep bacteremia       Interval History: No cardiac complaints.  Getting abx for strep bacteremia from foot ulcer.  She remains afebrile and stable.    Objective   Vital Signs  Temp:  [98 °F (36.7 °C)-100.7 °F (38.2 °C)] 98.3 °F (36.8 °C)  Heart Rate:  [77-87] 86  Resp:  [16-18] 18  BP: (117-132)/(59-80) 117/59  No intake or output data in the 24 hours ending 04/11/18 1024    Comfortable NAD  PERRL, conjunctiva clear  Neck supple, no JVD or thyromegaly appreciated  S1/S2 RRR, no m/r/g  Lungs CTA B, normal effort  Abdomen S/NT/ND (+) BS, no HSM appreciated  Extremities:  L toe ulcer  No visible or palpable skin lesions  A/Ox4, mood and affect appropriate    Results Review:        Results from last 7 days  Lab Units 04/11/18  0523 04/10/18  0520 04/09/18  0402   SODIUM mmol/L 134* 135* 139   POTASSIUM mmol/L 3.7 3.3* 3.4*   CHLORIDE mmol/L 100 101 102   CO2 mmol/L 25.6 22.6 20.0*   BUN mg/dL 25* 27* 25*   CREATININE mg/dL 0.74 0.78 0.93   GLUCOSE mg/dL 172* 190* 205*   CALCIUM mg/dL 8.7 8.4* 8.5*       Results from last 7 days  Lab Units 04/09/18  0402 04/08/18  1604 04/08/18  1051  04/07/18  1858   CK TOTAL U/L  --   --   --   --  62   TROPONIN T ng/mL <0.010 <0.010 <0.010  < > <0.010   < > = values in this interval not displayed.    Results from last 7 days  Lab Units 04/11/18  0523 04/10/18  0520 04/09/18  1158 04/09/18  0402   WBC 10*3/mm3 6.10 6.23  --  6.46   HEMOGLOBIN g/dL 10.9* 10.9*  --  11.5*   HEMATOCRIT % 33.6* 33.4*  --  34.6*   PLATELETS 10*3/mm3 91* 83* 75* 92*               Results from last 7 days  Lab Units 04/07/18  1858   MAGNESIUM mg/dL 1.8           I reviewed the patient's new clinical results.  I personally viewed and interpreted the patient's EKG/Telemetry data        Medication Review:     gabapentin 600 mg Oral Q8H   gadobenate dimeglumine 20 mL Intravenous Once in imaging    insulin aspart 2-5 Units Subcutaneous 4x Daily AC & at Bedtime   insulin NPH 15 Units Subcutaneous QAM   insulin NPH 15 Units Subcutaneous Nightly   insulin regular 8 Units Subcutaneous Daily With Breakfast & Dinner   levothyroxine 137 mcg Oral Q AM   penicillin g (potassium) 4 Million Units Intravenous Q4H         sodium chloride 9 mL/hr Last Rate: 9 mL/hr (04/09/18 0821)       Assessment/Plan     Active Problems:    Generalized weakness    Strep bacteremia.  On abx.  Her encephalopathy has improved significantly.  No clear evidence of endocarditis.  I would hold off on JAYE unless she shows signs of worsening.    I will continue to watch while she is in hospital.    Harpreet Dorantes MD  04/11/18  10:24 AM

## 2018-04-11 NOTE — PLAN OF CARE
Problem: Patient Care Overview  Goal: Plan of Care Review   04/11/18 2420   Coping/Psychosocial   Plan of Care Reviewed With patient   OTHER   Outcome Summary Patient is a pleasant 62 y.o. female who presents with impaired functional mobility and endurance secondary to admission for hyperglycemia, L foot infection, and generalized weakness. Patient is ind with all ADLs and no prior use of AD. Patient verbalized she has multiple steps in living environment but planning to DC home with daughter- access to home has option of no steps. All mobility required assist x1. Ambulation distance limited 2' to pain. May benefit from skilled PT services acutely to address deficits as able and improve level of independence. Possible need for RW upon DC-pending.

## 2018-04-11 NOTE — NURSING NOTE
"Diabetes Education  Assessment/Teaching    Patient Name:  Elisabet Berry  YOB: 1955  MRN: [id number]  Admit Date:  4/7/2018      Assessment Date:  4/11/2018  Flowsheet Row Most Recent Value   General Information    Referral From: A1c f/u with pt at bedside to reinforce DM ed.    Height 170.2 cm (67.01\")   Height Method Stated   Weight 107 kg (236 lb 12.4 oz)   Education Preferences   Barriers to Learning financial stress/problems e.g pt does not have medical/insurance.    Assessment Topics   Taking Medication - Assessment Needs education -pt will dc home new to N, R insulins. pt has hx insulin use-both via pen and via vial/syringe per pt.   Healthy Coping - Assessment Competent       Flowsheet Row Most Recent Value   DM Education Needs   Meter Has own   Medication Insulin, Vial -preview scheduling for NPH, Regular insulins. Advise pt that these insulins likely to be least expensive at [facility] (as Relion N and Relion R)   Healthy Coping Appropriate   Discharge Plan Home, Follow-up with PCP   Teaching Method Explanation, Handouts   Patient Response Verbalized understanding, Needs reinforcement          Electronically signed by:  Arpita Cohen, RN, BSN, CDE   04/11/18 3:51 PM  "

## 2018-04-11 NOTE — PLAN OF CARE
Problem: Patient Care Overview  Goal: Plan of Care Review  Outcome: Ongoing (interventions implemented as appropriate)   04/10/18 1727 04/10/18 2045 04/11/18 0325   Coping/Psychosocial   Plan of Care Reviewed With --  patient --    Plan of Care Review   Progress no change --  --    OTHER   Outcome Summary --  --  Pt. remains A&Ox4 throughout shift. Complains of both right shoulder pain and throat soreness. Up with 2 assist. Wash Left foot BID and paint with betadine.      Goal: Individualization and Mutuality  Outcome: Ongoing (interventions implemented as appropriate)    Goal: Discharge Needs Assessment  Outcome: Ongoing (interventions implemented as appropriate)      Problem: Skin Injury Risk (Adult)  Goal: Skin Health and Integrity  Outcome: Ongoing (interventions implemented as appropriate)      Problem: Fall Risk (Adult)  Goal: Absence of Fall  Outcome: Ongoing (interventions implemented as appropriate)      Problem: Sepsis/Septic Shock (Adult)  Goal: Signs and Symptoms of Listed Potential Problems Will be Absent, Minimized or Managed (Sepsis/Septic Shock)  Outcome: Ongoing (interventions implemented as appropriate)      Problem: Pain, Acute (Adult)  Goal: Acceptable Pain Control/Comfort Level  Outcome: Ongoing (interventions implemented as appropriate)

## 2018-04-11 NOTE — PROGRESS NOTES
62 y.o.   LOS: 4 days   Patient Care Team:  Christine Phelps DO as PCP - General (Family Medicine)    Chief Complaint:  Uncontrolled type 2 diabetes mellitus with complications    Chief Complaint   Patient presents with   • Fall     multiple falls today. last fall, hit head. small abrasion with hematoma to back of head. denies loc. denies blood thinner use. last normal was 1330 today.        Subjective     HPI   Patient appears to have tolerated the insulin regimen so far  Her blood sugars have stabilized in the 100-200 range  Patient is extremely concerned that she cannot afford Levemir and NovoLog at home  She would like to be switched to an economical option      Interval History     Patient is a 62-year-old white female with a past history of that medicine hypertension admitted to the hospital with altered mental status multiple falls and injury to the head and is currently being treated for cellulitis left leg and is currently on antibiotics  Patient's blood sugars were noted to be in the 200-400 range  Her hemoglobin A1c was 10.8 and was consulted for further managing patient's diabetes     Patient's mental status apparently improved as per the daughter at the bedside  Patient is able to answer questions  She reported that she's been diabetic for more than 20 years.  Until approximately 5 years ago she was working and was able to get care  She remembers being on toujeo 60 units daily along with victoza 1.8 mg daily.  She reports that the blood sugars used to be under control  She lost her job approximately 3 years ago and was managing with samples from the doctor's office.  She could not get consistent help with samples and goes through periods of no medication at all  Patient has not been checking her Accu-Cheks but reports that her blood sugars could range from 100-300 range sometimes higher     Patient reports symptoms of diabetic peripheral neuropathy and is currently on gabapentin  Patient denied any  knowledge of diabetic nephropathy  She denied any knowledge of diabetic retinopathy even though her last eye examination was about 2-3 years ago.     At the time of admission patient was noted to be in acute renal failure CT scan of the head was negative for any acute pathology next line patient is currently under nephrology care as well as intensive care specialists     Patient reported significant cost issues with the Levemir and victoza and was not able to use it without samples.   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    Respiratory: Negative.    Cardiovascular: Negative.    Gastrointestinal: Positive for abdominal distention.   Psychiatric/Behavioral: Negative.    All other systems reviewed and are negative.    Objective     Vital Signs   Temp:  [98 °F (36.7 °C)-100.7 °F (38.2 °C)] 98.7 °F (37.1 °C)  Heart Rate:  [77-89] 77  Resp:  [16-19] 17  BP: (112-132)/(70-80) 132/70    Physical Exam:  Physical Exam   Eyes: EOM are normal. Pupils are equal, round, and reactive to light.   Neck: Normal range of motion. Neck supple.   Cardiovascular: Normal rate, regular rhythm, normal heart sounds and intact distal pulses.    Pulmonary/Chest: Effort normal and breath sounds normal.   Abdominal: Soft. Bowel sounds are normal.   Musculoskeletal: Normal range of motion. She exhibits no edema.   Neurological: She is alert.   Skin: Skin is warm and dry.   Psychiatric: She has a normal mood and affect. Her behavior is normal.   Nursing note and vitals reviewed.  Results Review:     I reviewed the patient's new clinical results.      Glucose   Date/Time Value Ref Range Status   04/11/2018 0523 172 (H) 65 - 99 mg/dL Final   04/10/2018 0520 190 (H) 65 - 99 mg/dL Final   04/09/2018 0402 205 (H) 65 - 99 mg/dL Final     Lab Results (last 72 hours)     Procedure Component Value Units  Date/Time    [place] Spotted Fever, IgM [[id number]] Collected:  04/08/18 1051    Specimen:  Blood Updated:  04/11/18 0717     RMSF IgM 0.44 index      Comment:                                  Negative        <0.90                                   Equivocal 0.90 - 1.10                                   Positive        >1.10       Narrative:       Performed at:  [address] [place] [address]  [id number]  : Demian Batista MD, Phone:  [phone]    Basic Metabolic Panel [[id number]]  (Abnormal) Collected:  04/11/18 0523    Specimen:  Blood Updated:  04/11/18 0623     Glucose 172 (H) mg/dL      BUN 25 (H) mg/dL      Creatinine 0.74 mg/dL      Sodium 134 (L) mmol/L      Potassium 3.7 mmol/L      Chloride 100 mmol/L      CO2 25.6 mmol/L      Calcium 8.7 mg/dL      eGFR Non African Amer 79 mL/min/1.73      BUN/Creatinine Ratio 33.8 (H)     Anion Gap 8.4 mmol/L     Narrative:       GFR Normal >60  Chronic Kidney Disease <60  Kidney Failure <15    [facility] Spotted Fever, IgG [[id number]] Collected:  04/08/18 1051    Specimen:  Blood Updated:  04/11/18 0617     RMSF IgG Negative    Narrative:       Performed at:  [address] [place] [address]  [id number]  : Demina Batista MD, Phone:  [phone]    CBC (No Diff) [[id number]]  (Abnormal) Collected:  04/11/18 0523    Specimen:  Blood Updated:  04/11/18 0603     WBC 6.10 10*3/mm3      RBC 3.44 (L) 10*6/mm3      Hemoglobin 10.9 (L) g/dL      Hematocrit 33.6 (L) %      MCV 97.7 fL      MCH 31.7 pg      MCHC 32.4 g/dL      RDW 13.4 (H) %      RDW-SD 47.9 fl      MPV 10.9 fL      Platelets 91 (L) 10*3/mm3     POC Glucose Once [[id number]]  (Abnormal) Collected:  04/11/18 0501    Specimen:  Blood Updated:  04/11/18 0502     Glucose 153 (H) mg/dL     Narrative:       Meter: [id number] : [id number] Vincent ZAMORA    Blood Culture - Blood, [[id number]]  (Normal) Collected:  04/07/18 4648     Specimen:  Blood from Hand, Left Updated:  04/10/18 2231     Blood Culture No growth at 3 days    POC Glucose Once [[id number]]  (Abnormal) Collected:  04/10/18 2223    Specimen:  Blood Updated:  04/10/18 2224     Glucose 221 (H) mg/dL     Narrative:       Meter: [id number] : [id number] Vincent ZAMORA    POC Glucose Once [[id number]]  (Abnormal) Collected:  04/10/18 1613    Specimen:  Blood Updated:  04/10/18 1614     Glucose 214 (H) mg/dL     Narrative:       Meter: [id number] : [id number] Shaji Damon CNA    B. Burgdorferi Antibodies, WB Reflex [[id number]] Collected:  04/08/18 1051    Specimen:  Blood Updated:  04/10/18 1311     Lyme Ab IgG/IgM <0.91 ISR      Comment:                                 Negative         <0.91                                  Equivocal  0.91 - 1.09                                  Positive         >1.09        Lyme Disease Ab, Quant, IgM <0.80 index      Comment:                                 Negative         <0.80                                  Equivocal  0.80 - 1.19                                  Positive         >1.19   IgM levels may peak at 3-6 weeks post infection, then   gradually decline.       Narrative:       Performed at:  [address] [place], [place] [id number]  : Raulito Leonard PhD, Phone:  [phone]    POC Glucose Once [[id number]]  (Abnormal) Collected:  04/10/18 1124    Specimen:  Blood Updated:  04/10/18 1133     Glucose 209 (H) mg/dL     Narrative:       Meter: [id number] : [id number] Shaji Damon CNA    Blood Culture - Blood, [[id number]]  (Normal) Collected:  04/09/18 0930    Specimen:  Blood from Arm, Left Updated:  04/10/18 0946     Blood Culture No growth at 24 hours    Blood Culture - Blood, [[id number]]  (Normal) Collected:  04/09/18 0848    Specimen:  Blood from Hand, Left Updated:  04/10/18 0931     Blood Culture No growth at 24 hours    Blood Culture - Blood, [[id number]]  (Abnormal)   (Susceptibility) Collected:  04/07/18 2250    Specimen:  Blood from Arm, Left Updated:  04/10/18 0638     Blood Culture --      Streptococcus, Beta Hemolytic, Group C (A)     Isolated from Aerobic and Anaerobic Bottles     STREP GROUPING C     Gram Stain Result Anaerobic Bottle Gram positive cocci in chains      Aerobic Bottle Gram positive cocci in chains    Susceptibility      Streptococcus, Beta Hemolytic, Group C     GRETCHEN     Ceftriaxone <=0.12 ug/ml Susceptible     Clindamycin <=0.25 ug/ml Susceptible     Erythromycin <=0.12 ug/ml Susceptible     Levofloxacin 0.5 ug/ml Susceptible     Penicillin G <=0.06 ug/ml Susceptible     Vancomycin <=0.12 ug/ml Susceptible                    POC Glucose Once [[id number]]  (Abnormal) Collected:  04/10/18 0603    Specimen:  Blood Updated:  04/10/18 0614     Glucose 187 (H) mg/dL     Narrative:       Meter: [id number] : [id number] Amina Sorto CNA    Basic Metabolic Panel [[id number]]  (Abnormal) Collected:  04/10/18 0520    Specimen:  Blood Updated:  04/10/18 0605     Glucose 190 (H) mg/dL      BUN 27 (H) mg/dL      Creatinine 0.78 mg/dL      Sodium 135 (L) mmol/L      Potassium 3.3 (L) mmol/L      Chloride 101 mmol/L      CO2 22.6 mmol/L      Calcium 8.4 (L) mg/dL      eGFR Non African Amer 75 mL/min/1.73      BUN/Creatinine Ratio 34.6 (H)     Anion Gap 11.4 mmol/L     Narrative:       GFR Normal >60  Chronic Kidney Disease <60  Kidney Failure <15    CBC (No Diff) [[id number]]  (Abnormal) Collected:  04/10/18 0520    Specimen:  Blood Updated:  04/10/18 0603     WBC 6.23 10*3/mm3      RBC 3.44 (L) 10*6/mm3      Hemoglobin 10.9 (L) g/dL      Hematocrit 33.4 (L) %      MCV 97.1 fL      MCH 31.7 pg      MCHC 32.6 g/dL      RDW 13.5 (H) %      RDW-SD 47.8 fl      MPV 11.2 fL      Platelets 83 (L) 10*3/mm3     POC Glucose Once [[id number]]  (Abnormal) Collected:  04/09/18 2047    Specimen:  Blood Updated:  04/09/18 2048     Glucose 208 (H) mg/dL     Narrative:       Meter:  [id number] : [id number] India Huynh RN    POC Glucose Once [[id number]]  (Abnormal) Collected:  04/09/18 1639    Specimen:  Blood Updated:  04/09/18 1642     Glucose 179 (H) mg/dL     Narrative:       Meter: [id number] : [id number] Brittareinier Arlette ZAMORA    Immature Platelet Fraction [[id number]]  (Abnormal) Collected:  04/09/18 1158    Specimen:  Blood Updated:  04/09/18 1214     IPF 4.50 %      Platelets 75 (L) 10*3/mm3     POC Glucose Once [[id number]]  (Abnormal) Collected:  04/09/18 1123    Specimen:  Blood Updated:  04/09/18 1126     Glucose 254 (H) mg/dL     Narrative:       Meter: [id number] : [id number] Martin ZAMORA    POC Glucose Once [[id number]]  (Abnormal) Collected:  04/09/18 0717    Specimen:  Blood Updated:  04/09/18 0719     Glucose 192 (H) mg/dL     Narrative:       Meter: [id number] : [id number] Martin ZAMORA    CBC & Differential [[id number]] Collected:  04/09/18 0402    Specimen:  Blood Updated:  04/09/18 0506    Narrative:       The following orders were created for panel order CBC & Differential.  Procedure                               Abnormality         Status                     ---------                               -----------         ------                     Manual Differential[[id number]]          Abnormal            Final result               Scan Slide[[id number]]                                       Final result               CBC Auto Differential[[id number]]        Abnormal            Final result                 Please view results for these tests on the individual orders.    CBC Auto Differential [[id number]]  (Abnormal) Collected:  04/09/18 0402    Specimen:  Blood Updated:  04/09/18 0506     WBC 6.46 10*3/mm3      RBC 3.59 (L) 10*6/mm3      Hemoglobin 11.5 (L) g/dL      Hematocrit 34.6 (L) %      MCV 96.4 fL      MCH 32.0 pg      MCHC 33.2 g/dL      RDW 13.6 (H) %      RDW-SD 48.1 fl      MPV 11.9 fL      Platelets 92 (L) 10*3/mm3     Scan Slide [[id number]]  Collected:  04/09/18 0402    Specimen:  Blood Updated:  04/09/18 0506     Scan Slide --     Comment: See Manual Differential Results       Manual Differential [[id number]]  (Abnormal) Collected:  04/09/18 0402    Specimen:  Blood Updated:  04/09/18 0506     Neutrophil % 87.0 (H) %      Comment: 1+ bands noted        Lymphocyte % 9.0 (L) %      Monocyte % 3.0 (L) %      Metamyelocyte % 1.0 (H) %      Neutrophils Absolute 5.62 10*3/mm3      Lymphocytes Absolute 0.58 (L) 10*3/mm3      Monocytes Absolute 0.19 (L) 10*3/mm3      Dacrocytes Slight/1+     WBC Morphology Normal     Platelet Morphology Normal    Comprehensive Metabolic Panel [[id number]]  (Abnormal) Collected:  04/09/18 0402    Specimen:  Blood Updated:  04/09/18 0506     Glucose 205 (H) mg/dL      BUN 25 (H) mg/dL      Creatinine 0.93 mg/dL      Sodium 139 mmol/L      Potassium 3.4 (L) mmol/L      Chloride 102 mmol/L      CO2 20.0 (L) mmol/L      Calcium 8.5 (L) mg/dL      Total Protein 6.5 g/dL      Albumin 3.00 (L) g/dL      ALT (SGPT) 42 (H) U/L      AST (SGOT) 44 (H) U/L      Alkaline Phosphatase 65 U/L      Total Bilirubin 0.9 mg/dL      eGFR Non African Amer 61 mL/min/1.73      Globulin 3.5 gm/dL      A/G Ratio 0.9 g/dL      BUN/Creatinine Ratio 26.9 (H)     Anion Gap 17.0 mmol/L     Troponin [[id number]]  (Normal) Collected:  04/09/18 0402    Specimen:  Blood Updated:  04/09/18 0506     Troponin T <0.010 ng/mL     Narrative:       Troponin T Reference Ranges:  Less than 0.03 ng/mL:    Negative for AMI  0.03 to 0.09 ng/mL:      Indeterminant for AMI  Greater than 0.09 ng/mL: Positive for AMI    POC Glucose Once [[id number]]  (Abnormal) Collected:  04/09/18 0250    Specimen:  Blood Updated:  04/09/18 0251     Glucose 201 (H) mg/dL     Narrative:       Meter: [id number] : [id number] Frantz Pallavi TERRANCE    POC Glucose Once [[id number]]  (Abnormal) Collected:  04/08/18 2319    Specimen:  Blood Updated:  04/08/18 2320     Glucose 209 (H) mg/dL      Narrative:       Meter: [id number] : [id number] Frantz Olivo TERRANCE    POC Glucose Once [[id number]]  (Abnormal) Collected:  04/08/18 1925    Specimen:  Blood Updated:  04/08/18 1926     Glucose 235 (H) mg/dL     Narrative:       Meter: [id number] : [id number] Linda LLANES RN    Blood Culture ID, PCR - Blood, [[id number]]  (Abnormal) Collected:  04/07/18 2250    Specimen:  Blood from Arm, Left Updated:  04/08/18 1800     BCID, PCR Streptococcus spp, not A, B, or pneumoniae. Identification by BCID PCR. (C)    Hemoglobin A1c [[id number]]  (Abnormal) Collected:  04/08/18 0059    Specimen:  Blood Updated:  04/08/18 1705     Hemoglobin A1C 10.80 (H) %     Narrative:       Hemoglobin A1C Ranges:    Increased Risk for Diabetes  5.7% to 6.4%  Diabetes                     >= 6.5%  Diabetic Goal                < 7.0%    Uric Acid [[id number]]  (Normal) Collected:  04/08/18 1604    Specimen:  Blood from Arm, Right Updated:  04/08/18 1637     Uric Acid 3.1 mg/dL     Troponin [[id number]]  (Normal) Collected:  04/08/18 1604    Specimen:  Blood from Arm, Right Updated:  04/08/18 1636     Troponin T <0.010 ng/mL     Narrative:       Troponin T Reference Ranges:  Less than 0.03 ng/mL:    Negative for AMI  0.03 to 0.09 ng/mL:      Indeterminant for AMI  Greater than 0.09 ng/mL: Positive for AMI    Lactic Acid, Plasma [[id number]]  (Normal) Collected:  04/08/18 1604    Specimen:  Blood from Arm, Right Updated:  04/08/18 1636     Lactate 1.5 mmol/L     Blood Gas, Arterial [[id number]]  (Abnormal) Collected:  04/08/18 1606    Specimen:  Arterial Blood Updated:  04/08/18 1609     Site Arterial: right radial     Kieran's Test Positive     pH, Arterial 7.469 (H) pH units      pCO2, Arterial 22.9 (L) mm Hg      pO2, Arterial 71.2 (L) mm Hg      HCO3, Arterial 16.6 (L) mmol/L      Base Excess, Arterial -5.2 (L) mmol/L      O2 Saturation Calculated 95.5 %      A-a Gradiant 0.6 mmHg      Barometric Pressure for Blood Gas 749.7 mmHg       Modality Room Air     FIO2 21 %      Set [facility] Resp Rate 25    Narrative:       o2 sat 94 Meter: [id number] : [id number] Lance Tesfaye    POC Glucose Once [[id number]]  (Abnormal) Collected:  04/08/18 1515    Specimen:  Blood Updated:  04/08/18 1527     Glucose 298 (H) mg/dL     Narrative:       Meter: [id number] : [id number] Carlitos MILLER RN    Troponin [[id number]]  (Normal) Collected:  04/08/18 1051    Specimen:  Blood Updated:  04/08/18 1124     Troponin T <0.010 ng/mL     Narrative:       Troponin T Reference Ranges:  Less than 0.03 ng/mL:    Negative for AMI  0.03 to 0.09 ng/mL:      Indeterminant for AMI  Greater than 0.09 ng/mL: Positive for AMI    POC Glucose Once [[id number]]  (Abnormal) Collected:  04/08/18 1120    Specimen:  Blood Updated:  04/08/18 1123     Glucose 256 (H) mg/dL     Narrative:       Meter: [id number] : [id number] Deepak Diaz NOAH    Ehrlichia Antibody Panel [[id number]] Collected:  04/08/18 1051    Specimen:  Blood Updated:  04/08/18 1055    Babesia Microti Antibody Panel [[id number]] Collected:  04/08/18 1051    Specimen:  Blood Updated:  04/08/18 1055        Imaging Results (last 72 hours)     Procedure Component Value Units Date/Time    MRI Foot Right Without Contrast [[id number]] Collected:  04/09/18 1849     Updated:  04/10/18 0755    Narrative:       RIGHT MID AND FOREFOOT MRI WITHOUT CONTRAST     HISTORY: Soft tissue swelling. Evaluate for infection.     Limited MRI of the right mid and forefoot was performed using short axis  STIR and T1 images. The patient had a hard time holding still for the  exam which had to be discontinued without completing the entire  protocol. Left mid and forefoot MRI was performed yesterday and is  reported separately.     FINDINGS: There is soft tissue edema in the right mid and forefoot.  There is no bone marrow signal abnormality around the midfoot or the  forefoot to suggest osteomyelitis. Foot musculature is deconditioned but  not frankly  atrophic. No focal fluid collection is identified.       Impression:       Right forefoot MRI is somewhat limited but there is no  findings suspicious of osteomyelitis or abscess.     This report was finalized on 4/10/2018 7:52 AM by Dr. Nagi Avalos MD.       MRI Brain Without Contrast [[id number]] Collected:  04/08/18 1933     Updated:  04/10/18 0547    Narrative:       BRAIN MRI WITHOUT GADOLINIUM     HISTORY: Confusion/delirium, altered LOC, unexplained.     COMPARISON: None.     FINDINGS:  Multiplanar images of the head were obtained without the use  of intravenous contrast.     There is a mild degree of generalized atrophy. There are a few scattered  foci of increased T2 and FLAIR signal abnormality in the white matter  bilaterally most likely sequelae of mild/early ischemic gliotic change.  No foci of restricted diffusion are demonstrated on diffusion weighted  images (DWI) to suggest acute ischemia. Remainder of the brain  parenchyma is unremarkable. The ventricular system is normal in caliber  and configuration for age. Midline structures corpus callosum, pituitary  gland, and brainstem are unremarkable. There is no evidence of extra  axial mass or fluid collection. The major vascular flow voids are  patent. The orbital and encompassed paranasal soft tissues are  unremarkable. Osseous marrow signal intensity is within normal limits.     Preliminary interpretation telephoned directly to Dr. Marquez during  interpretation at 7:19 PM.                Impression:       1. Very mild atrophy and chronic appearing ischemic gliotic changes, no  acute intracranial finding     This report was finalized on 4/10/2018 5:43 AM by Gian Vance MD.       MRI Foot Left With & Without Contrast [[id number]] Collected:  04/09/18 1011     Updated:  04/09/18 1507    Narrative:       LEFT MID AND FOREFOOT MRI WITH AND WITHOUT CONTRAST     HISTORY: Sepsis with cellulitis and swelling at the 3rd toe.     TECHNIQUE: MRI of the left  mid and forefoot was performed with and  without IV contrast. There is no previous exam for comparison.     FINDINGS: Marrow signal throughout the mid and forefoot is normal.  Specifically, there is no marrow signal abnormality of the 3rd toe.  There is soft tissue edema throughout the mid and forefoot with soft  tissue enhancement along the 3rd toe. No fluid collection is observed at  the 3rd toe. The interphalangeal joints and the metatarsophalangeal  joints demonstrate normal amount of fluid and no abnormal contrast  enhancement.     There is some degenerative change at the 1st and 2nd tarsometatarsal  joints with a small volume of fluid around the plantar edge of the 1st  tarsometatarsal joint. There is also a small volume of fluid in the  flexor hallucis longus tendon sheath at the same level. Some soft tissue  edema and enhancement is observed along the medial plantar surface of  the foot in the same region as the small fluid collections. There is no  marrow signal abnormality around the 1st metatarsophalangeal joint,  however. No other fluid collection is identified in the mid or forefoot.  The flexor and extensor tendons appear intact.       Impression:       Cellulitis at the 3rd toe without evidence of osteomyelitis  or septic arthritis.     There is some fluid in the flexor hallucis longus tendon sheath and  plantar to the 1st metatarsophalangeal joint, favored to be arising from  that joint, with some soft tissue enhancement along the plantar surface  of the foot in the same region. Given the clinical scenario, it is  possible that these changes are infectious. There is no evidence of  osteomyelitis around the visualized midfoot. The entire hindfoot is not  included in this exam.     This report was finalized on 4/9/2018 3:04 PM by Dr. Nagi Avalos MD.       XR Shoulder 2+ View Right [[id number]] Collected:  04/08/18 1634     Updated:  04/08/18 1946    Narrative:       RIGHT SHOULDER 2 VIEWS 04/08/2018       HISTORY: Right shoulder pain.     FINDINGS: There is some minimal hypertrophic change of the inferior  glenoid. No fractures or dislocations are seen. AC joint appears intact.       Impression:       1. No acute process.  2. Minimal hypertrophic change in the inferior glenoid.     This report was finalized on 4/8/2018 7:43 PM by Dr. Tim Gunderson MD.             Medication Review:       Current Facility-Administered Medications:   •  acetaminophen (TYLENOL) suppository 650 mg, 650 mg, Rectal, Q4H PRN, Christian Mcknight MD, 650 mg at 04/08/18 2019  •  acetaminophen (TYLENOL) tablet 650 mg, 650 mg, Oral, Q6H PRN, Maria Luisa Acuna MD, 650 mg at 04/10/18 2156  •  dextrose (D50W) solution 25 g, 25 g, Intravenous, Q15 Min PRN, Christian Mcknight MD  •  dextrose (GLUTOSE) oral gel 15 g, 15 g, Oral, Q15 Min PRN, Christian Mcknight MD  •  gabapentin (NEURONTIN) capsule 600 mg, 600 mg, Oral, Q8H, Jerry Russo MD, 600 mg at 04/11/18 0654  •  gadobenate dimeglumine (MULTIHANCE) injection 20 mL, 20 mL, Intravenous, Once in imaging, Paul Key MD  •  glucagon (human recombinant) (GLUCAGEN DIAGNOSTIC) injection 1 mg, 1 mg, Subcutaneous, PRN, Christian Mcknight MD  •  insulin aspart (novoLOG) injection 2-5 Units, 2-5 Units, Subcutaneous, 4x Daily AC & at Bedtime, Mike DING MD, 3 Units at 04/10/18 2248  •  insulin aspart (novoLOG) injection 8 Units, 8 Units, Subcutaneous, TID With Meals, Mike DING MD  •  insulin detemir (LEVEMIR) injection 35 Units, 35 Units, Subcutaneous, Nightly, Mike DING MD  •  levothyroxine (SYNTHROID, LEVOTHROID) tablet 137 mcg, 137 mcg, Oral, Q AM, Mike DING MD, 137 mcg at 04/11/18 0654  •  morphine injection 2 mg, 2 mg, Intravenous, Q3H PRN, Paul H. Key, MD, 2 mg at 04/08/18 1004  •  morphine injection 4 mg, 4 mg, Intravenous, Q4H PRN, Paul Key MD  •  ondansetron (ZOFRAN) injection 4 mg, 4 mg, Intravenous, Q4H PRN, Maria Luisa  MD Kalpana, 4 mg at 04/09/18 1450  •  penicillin G potassium 4 Million Units in sodium chloride 0.9 % 100 mL IVPB, 4 Million Units, Intravenous, Q4H, Kalyan Deleon MD, 4 Million Units at 04/11/18 0436  •  potassium chloride (MICRO-K) CR capsule 40 mEq, 40 mEq, Oral, PRN, 40 mEq at 04/10/18 1409 **OR** potassium chloride (KLOR-CON) packet 40 mEq, 40 mEq, Oral, PRN **OR** potassium chloride 10 mEq in 100 mL IVPB, 10 mEq, Intravenous, Q1H PRN, Paul Key MD  •  potassium chloride 10 mEq in 100 mL IVPB, 10 mEq, Intravenous, Q1H PRN, Maria Luisa Acuna MD, Last Rate: 100 mL/hr at 04/09/18 1458, 10 mEq at 04/09/18 1458  •  sodium chloride 0.9 % flush 1-10 mL, 1-10 mL, Intravenous, PRN, Christian Mcknight MD  •  sodium chloride 0.9 % flush 10 mL, 10 mL, Intravenous, PRN, Antolin Montero MD  •  sodium chloride 0.9 % infusion, 9 mL/hr, Intravenous, Continuous, Maria Luisa Acuna MD, Last Rate: 9 mL/hr at 04/09/18 0821, 9 mL/hr at 04/09/18 0821    Assessment/Plan     Patient Active Problem List   Diagnosis   • Generalized weakness   Uncontrolled type 2 diabetes mellitus with hemoglobin A1c of 10.8%  Uncontrolled type 2 diabetes mellitus with diabetic peripheral neuropathy  Altered mental status  Hypothyroidism on medication  Patient is not clear about the dose I advised the daughter to go home to verify the dose so that she may be started on levothyroxine  Obesity      I discussed further management of patient's diabetes  I discussed the insulin management as well as oral medication  Patient cannot afford insulin pens at this point and wants a cheaper option     Patient recalls using Invokamet.  She tried metformin but which caused stomach issues  She does not recall the names of other medication    Patient's blood sugars have stabilized in the 100-200 range  Patient reports that she still cannot think she can afford Levemir and NovoLog regimen at home  She has used insulin syringes in the past  She is  "comfortable taking insulin injections    I discussed the use of NPH and regular insulin twice daily  Patient reported that she typically eats brunch and supper  Currently she is eating 3 meals daily during this hospitalization    I will start her on NPH insulin 15 units twice daily breakfast and bedtime  I will start her on 8 units of regular insulin at breakfast and suppertime  Will continue to monitor the Accu-Cheks and then adjust insulin as needed over the next few days of her hospitalization until discharge     Both patient and her daughter verbalized understanding  The total time spent for old record and lab review and floor time was more than 35 min of which greater than 20 min of time  ( greater than 50% of the total time )  was spent face to face with the patient counseling and coordination of care owas spent on counseling the patient on recommended evaluation and treatment options, instructions for management/treatment and /or follow up  and importance of compliance with chosen management or treatment options    Mike Chin MD FACE.  04/11/18  8:19 AM      EMR Dragon / transcription disclaimer:     \"Dictated utilizing Dragon dictation\".   "

## 2018-04-11 NOTE — PROGRESS NOTES
LOS: 4 days     Chief Complaint:  Follow-up Group C Strep septicemia    Interval History:  No acute events. 1x fever last night. No new symptoms. Tolerating antibiotics w/o rash or diarrhea. L foot swelling persists. Minimal dull intermittent pain better w/ pain meds and rest. Worse w/ movement and palpation.    ROS: no n/v/d    Vital Signs  Temp:  [98 °F (36.7 °C)-100.7 °F (38.2 °C)] 98.3 °F (36.8 °C)  Heart Rate:  [77-87] 86  Resp:  [16-18] 18  BP: (117-132)/(59-80) 117/59    Physical Exam:  General: awake, more alert today  Head: Normocephalic  Eyes:  no conjunctival pallor, no conjunctival hemorrhages.   ENT: MMM, OP clear, no thrush. Fair dentition.   Neck: Supple  Cardiovascular: NR, 1+LLE edema  Respiratory: normal work of breathing on ambient air  GI: Abdomen is soft, non-tender, non-distended  : no Biggs catheter present  Musculoskeletal: Left foot less erythematous but still edematous; 3rd toe specifically with shallow ulcer on dorsum; no other joint abnormalities, normal musculature  Skin: No embolic phenomenon  Neurological: Alert and oriented x 3,motor strength 5/5 in all four extremities  Psychiatric: Normal mood and affect   Vasc: no cyanosis; PIV w/o erythema    Antibiotics:  •  penicillin G potassium 4 Million Units in sodium chloride 0.9 % 100 mL IVPB, 4 Million Units, Intravenous, Q4H, Kalyan Deleon MD    LABS:  CBC, CRP, Crt, micro reviewed today  Lab Results   Component Value Date    WBC 6.10 04/11/2018    HGB 10.9 (L) 04/11/2018    HCT 33.6 (L) 04/11/2018    MCV 97.7 04/11/2018    PLT 91 (L) 04/11/2018     Lab Results   Component Value Date    CREATININE 0.74 04/11/2018     Lab Results   Component Value Date    CRP 5.43 (H) 04/07/2018     Lab Results   Component Value Date    HGBA1C 10.80 (H) 04/08/2018       Microbiology:  4/7 BCx: Strep group C species in 1/2 sets (sensitive to PCN, CTX, and LVQ)  4/7 RVP: negative  4/9 BCx: NGTD    Radiology (personally reviewed report):   No  new imaging today    Assessment/Plan   1. Sepsis due to Strep group C septicemia secondary to left foot cellulitis and tenosynovitis secondary to diabetic foot ulcer  -MRI negative for deep infection; some tendon changes but no fluid collections  -continue pCN G 4 million units q4h; will use this while in the hospital then change to  mg PO q24h x 3 weeks through 4/29/18 at time of discharge  -recommended outpatient wound care closer to home after discharge  -repeat BCx NGTD  -does not need a JAYE from my standpoint     2. Uncontrolled DM2  -poor compliance as an outpatient she says  -encouraged stricter control to promote healing and decrease risk of infection    Thank you for this consult. ID will follow.

## 2018-04-11 NOTE — PROGRESS NOTES
LOS: 3 days   Patient Care Team:  Christine Phelps DO as PCP - General (Family Medicine)    Chief Complaint: Strep bacteremia       Interval History: No cardiac complaints.  Getting abx for strep bacteremia from foot ulcer.    Objective   Vital Signs  Temp:  [98 °F (36.7 °C)-99.3 °F (37.4 °C)] 98 °F (36.7 °C)  Heart Rate:  [82-96] 87  Resp:  [15-19] 16  BP: (110-148)/(63-82) 120/76    Intake/Output Summary (Last 24 hours) at 04/10/18 2029  Last data filed at 04/09/18 2100   Gross per 24 hour   Intake                0 ml   Output              300 ml   Net             -300 ml       Comfortable NAD  PERRL, conjunctiva clear  Neck supple, no JVD or thyromegaly appreciated  S1/S2 RRR, no m/r/g  Lungs CTA B, normal effort  Abdomen S/NT/ND (+) BS, no HSM appreciated  Extremities:  L toe ulcer  No visible or palpable skin lesions  A/Ox4, mood and affect appropriate    Results Review:        Results from last 7 days  Lab Units 04/10/18  0520 04/09/18  0402 04/08/18  0059   SODIUM mmol/L 135* 139 139   POTASSIUM mmol/L 3.3* 3.4* 3.6   CHLORIDE mmol/L 101 102 105   CO2 mmol/L 22.6 20.0* 17.1*   BUN mg/dL 27* 25* 22   CREATININE mg/dL 0.78 0.93 0.92   GLUCOSE mg/dL 190* 205* 316*   CALCIUM mg/dL 8.4* 8.5* 6.9*       Results from last 7 days  Lab Units 04/09/18  0402 04/08/18  1604 04/08/18  1051  04/07/18  1858   CK TOTAL U/L  --   --   --   --  62   TROPONIN T ng/mL <0.010 <0.010 <0.010  < > <0.010   < > = values in this interval not displayed.    Results from last 7 days  Lab Units 04/10/18  0520 04/09/18  1158 04/09/18  0402 04/08/18  0059   WBC 10*3/mm3 6.23  --  6.46 9.46   HEMOGLOBIN g/dL 10.9*  --  11.5* 11.2*   HEMATOCRIT % 33.4*  --  34.6* 34.9*   PLATELETS 10*3/mm3 83* 75* 92* 86*               Results from last 7 days  Lab Units 04/07/18  1858   MAGNESIUM mg/dL 1.8           I reviewed the patient's new clinical results.  I personally viewed and interpreted the patient's EKG/Telemetry data        Medication  Review:     gabapentin 600 mg Oral Q8H   gadobenate dimeglumine 20 mL Intravenous Once in imaging   insulin aspart 2-5 Units Subcutaneous 4x Daily AC & at Bedtime   insulin aspart 5 Units Subcutaneous TID With Meals   insulin detemir 25 Units Subcutaneous Nightly   levothyroxine 137 mcg Oral Q AM   penicillin g (potassium) 4 Million Units Intravenous Q4H         sodium chloride 9 mL/hr Last Rate: 9 mL/hr (04/09/18 0821)       Assessment/Plan     Active Problems:    Generalized weakness    Strep bacteremia.  On abx.  Her encephalopathy has improved significantly.  No clear evidence of endocarditis.  I would hold off on JAYE unless she shows signs of worsening.      Harpreet Dorantes MD  04/10/18  8:29 PM

## 2018-04-11 NOTE — PROGRESS NOTES
PROGRESS NOTE  Patient Name: Elisabet Berry  Age/Sex: 62 y.o. female  : 1955  MRN: [id number]    Date of Admission: 2018  Date of Encounter Visit: 18   LOS: 4 days   Patient Care Team:  Christine Phelps DO as PCP - General (Family Medicine)    Chief Complaint: Left leg cellulitis    Interval History: Patient came in with syncope with no evidence of stroke on her workup. she does have cellulitis of her left lower extremity and is being followed by infectious disease and vascular surgery.  Patient did have sepsis due to strep group C septicemia secondary to left foot cellulitis from diabetic foot ulcer.  MRI was negative for deep infection, but there were some tendon changes with no fluid collection or other sign of osteomyelitis.  Initially treated with Rocephin 2 g, but was switched to penicillin 4 million units every 4 hours on 4/10/18 with plan to transition to Levaquin 750 mg by mouth every 24 hours until 18.  Repeat blood cultures have been negative.  She was complaining of shoulder and leg pain and her home Neurontin was continued as well as when necessary narcotic.  She has a history of diabetes and has been relying on samples for various drugs as outpatient.  Endocrinology was consulted and she was treated with insulin therapy and will consider affordable options at discharge given that she may not have insurance at this time.  Patient was evaluated by vascular surgery and at this point no intervention or amputation is necessary.  Patient is alert with no more neurological symptoms or recurrent syncope  Patient still complaining of discomfort from the leg and it still warm to touch but it is improving.      REVIEW OF SYSTEMS:   CARDIOVASCULAR: No chest pain, chest pressure or chest discomfort. No palpitations , positive edema.   RESPIRATORY: No shortness of breath, cough or sputum.   GASTROINTESTINAL: No anorexia, nausea, vomiting or diarrhea. No abdominal pain or blood.  "  HEMATOLOGIC: No bleeding or bruising.     Ventilator/Non-Invasive Ventilation Settings     None            Vital Signs  Temp:  [97.8 °F (36.6 °C)-100.7 °F (38.2 °C)] 97.8 °F (36.6 °C)  Heart Rate:  [74-86] 74  Resp:  [16-18] 18  BP: (107-132)/(54-72) 107/54  SpO2:  [92 %-100 %] 92 %  on    Device (Oxygen Therapy): room air  No intake or output data in the 24 hours ending 04/11/18 1817  Flowsheet Rows    Flowsheet Row First Filed Value   Admission Height 167.6 cm (66\") Documented at 04/07/2018 1854   Admission Weight 102 kg (225 lb) Documented at 04/07/2018 1854        Body mass index is 37.08 kg/m².  1    04/07/18  1854 04/08/18  0100 04/09/18  0851   Weight: 102 kg (225 lb) 107 kg (236 lb 12.4 oz) 107 kg (236 lb 12.4 oz)       Physical Exam:  GEN:  No acute distress, alert, cooperative, well developed, On room air  EYES:   Sclera clear. No icterus. PERRL.  ENT:   External ears/nose normal, no oral lesions, no thrush, mucous membranes moist  NECK:  Supple, midline trachea  LUNGS: Normal chest on inspection, CTAB with diminished bases, no wheezes. No rhonchi. No crackles. Respirations regular, even and unlabored.   CV:  Regular rhythm and rate. Normal S1/S2. No murmurs, gallops, or rubs noted.  ABD:  Soft, non-tender and non-distended. Normal bowel sounds. No guarding  EXT:  Moves all extremities well. No cyanosis. No redness.  Positive left lower extremity asymmetric edema.   Skin: Cellulitis around the second and the third toe on the left with some swelling and edema of that right lower extremity, no bleeding, she does have an ulcer at the tip of the toe    Results Review:        Results from last 7 days  Lab Units 04/11/18  0523 04/10/18  0520 04/09/18  0402 04/08/18  0059 04/07/18  1858   SODIUM mmol/L 134* 135* 139 139 130*   POTASSIUM mmol/L 3.7 3.3* 3.4* 3.6 5.8*   CHLORIDE mmol/L 100 101 102 105 91*   CO2 mmol/L 25.6 22.6 20.0* 17.1* 24.2   BUN mg/dL 25* 27* 25* 22 30*   CREATININE mg/dL 0.74 0.78 0.93 0.92 " 1.30*   CALCIUM mg/dL 8.7 8.4* 8.5* 6.9* 9.7   AST (SGOT) U/L  --   --  44* 30 48*   ALT (SGPT) U/L  --   --  42* 39* 63*   ANION GAP mmol/L 8.4 11.4 17.0 16.9 14.8   ALBUMIN g/dL  --   --  3.00* 2.80* 4.30       Results from last 7 days  Lab Units 04/09/18  0402 04/08/18  1604 04/08/18  1051  04/07/18  1858   CK TOTAL U/L  --   --   --   --  62   TROPONIN T ng/mL <0.010 <0.010 <0.010  < > <0.010   < > = values in this interval not displayed.            Results from last 7 days  Lab Units 04/11/18  0523 04/10/18  0520 04/09/18  1158 04/09/18  0402 04/08/18  0059 04/07/18  1858   WBC 10*3/mm3 6.10 6.23  --  6.46 9.46 8.38   HEMOGLOBIN g/dL 10.9* 10.9*  --  11.5* 11.2* 14.5   HEMATOCRIT % 33.6* 33.4*  --  34.6* 34.9* 42.6   PLATELETS 10*3/mm3 91* 83* 75* 92* 86* 104*   MCV fL 97.7 97.1  --  96.4 99.4* 96.2   NEUTROPHIL % %  --   --   --   --  88.5* 89.2*   LYMPHOCYTE % %  --   --   --   --  7.1* 6.9*   MONOCYTES % %  --   --   --   --  4.0* 3.5*   EOSINOPHIL % %  --   --   --   --  0.0* 0.1*   BASOPHIL % %  --   --   --   --  0.1 0.1   IMM GRAN % %  --   --   --   --  0.3 0.2           Results from last 7 days  Lab Units 04/07/18  1858   MAGNESIUM mg/dL 1.8           Invalid input(s): LDLCALC    Results from last 7 days  Lab Units 04/08/18  1606 04/08/18  0015   PH, ARTERIAL pH units 7.469* 7.453*   PCO2, ARTERIAL mm Hg 22.9* 26.6*   PO2 ART mm Hg 71.2* 75.1*   HCO3 ART mmol/L 16.6* 18.6*       Results from last 7 days  Lab Units 04/08/18  0059   HEMOGLOBIN A1C % 10.80*     Glucose   Date/Time Value Ref Range Status   04/11/2018 1619 210 (H) 70 - 130 mg/dL Final   04/11/2018 1117 229 (H) 70 - 130 mg/dL Final   04/11/2018 0501 153 (H) 70 - 130 mg/dL Final   04/10/2018 2223 221 (H) 70 - 130 mg/dL Final   04/10/2018 1613 214 (H) 70 - 130 mg/dL Final   04/10/2018 1124 209 (H) 70 - 130 mg/dL Final   04/10/2018 0603 187 (H) 70 - 130 mg/dL Final   04/09/2018 2047 208 (H) 70 - 130 mg/dL Final       Results from last 7  days  Lab Units 04/08/18  1604 04/08/18  0216 04/07/18  2214 04/07/18  1858   PROCALCITONIN ng/mL  --   --   --  1.08*   LACTATE mmol/L 1.5 2.4* 3.6*  --        Results from last 7 days  Lab Units 04/09/18  0930 04/09/18  0848 04/07/18  2250 04/07/18  2213   BLOODCX  No growth at 2 days No growth at 2 days   Streptococcus, Beta Hemolytic, Group C* No growth at 3 days   GRAM STAIN RESULT   --   --  Anaerobic Bottle Gram positive cocci in chains  Aerobic Bottle Gram positive cocci in chains  --    BCIDPCR   --   --  Streptococcus spp, not A, B, or pneumoniae. Identification by BCID PCR.*  --        Results from last 7 days  Lab Units 04/07/18  1942   NITRITE UA  Negative       Results from last 7 days  Lab Units 04/07/18  2217   ADENOVIRUS DETECTION BY PCR  Not Detected   CORONAVIRUS 229E  Not Detected   CORONAVIRUS HKU1  Not Detected   CORONAVIRUS NL63  Not Detected   CORONAVIRUS OC43  Not Detected   HUMAN METAPNEUMOVIRUS  Not Detected   HUMAN RHINOVIRUS/ENTEROVIRUS  Not Detected   INFLUENZA B PCR  Not Detected   PARAINFLUENZA 1  Not Detected   PARAINFLUENZA VIRUS 2  Not Detected   PARAINFLUENZA VIRUS 3  Not Detected   PARAINFLUENZA VIRUS 4  Not Detected   BORDETELLA PERTUSSIS PCR  Not Detected   CHLAMYDOPHILA PNEUMONIAE PCR  Not Detected   MYCOPLAMA PNEUMO PCR  Not Detected   INFLUENZA A PCR  Not Detected   INFLUENZA A H3  Not Detected   INFLUENZA A H1  Not Detected   RSV, PCR  Not Detected       Results from last 7 days  Lab Units 04/08/18  1604   URIC ACID mg/dL 3.1       Imaging:   Imaging Results (all)     Procedure Component Value Units Date/Time    MRI Foot Left With & Without Contrast [[id number]] Collected:  04/09/18 1011     Updated:  04/09/18 1011    Narrative:       LEFT MID AND FOREFOOT MRI WITH AND WITHOUT CONTRAST     HISTORY: Sepsis with cellulitis and swelling at the 3rd toe.     TECHNIQUE: MRI of the left mid and forefoot was performed with and  without IV contrast. There is no previous exam for  comparison.     FINDINGS: Marrow signal throughout the mid and forefoot is normal.  Specifically, there is no marrow signal abnormality of the 3rd toe.  There is soft tissue edema throughout the mid and forefoot with soft  tissue enhancement along the 3rd toe. No fluid collection is observed at  the 3rd toe. The interphalangeal joints and the metatarsophalangeal  joints demonstrate normal amount of fluid and no abnormal contrast  enhancement.     There is some degenerative change at the 1st and 2nd tarsometatarsal  joints with a small volume of fluid around the plantar edge of the 1st  tarsometatarsal joint. There is also a small volume of fluid in the  flexor hallucis longus tendon sheath at the same level. Some soft tissue  edema and enhancement is observed along the medial plantar surface of  the foot in the same region as the small fluid collections. There is no  marrow signal abnormality around the 1st metatarsophalangeal joint,  however. No other fluid collection is identified in the mid or forefoot.  The flexor and extensor tendons appear intact.       Impression:       Cellulitis at the 3rd toe without evidence of osteomyelitis  or septic arthritis.     There is some fluid in the flexor hallucis longus tendon sheath and  plantar to the 1st metatarsophalangeal joint, favored to be arising from  that joint, with some soft tissue enhancement along the plantar surface  of the foot in the same region. Given the clinical scenario, it is  possible that these changes are infectious. There is no evidence of  osteomyelitis around the visualized midfoot. The entire hindfoot is not  included in this exam.             I reviewed the patient's new clinical results.  I personally viewed and interpreted the patient's imaging results:Chest x-ray showed no acute disease, MRI films findings per radiologist        Medication Review:     gabapentin 600 mg Oral Q8H   gadobenate dimeglumine 20 mL Intravenous Once in imaging    insulin aspart 2-5 Units Subcutaneous 4x Daily AC & at Bedtime   insulin NPH 15 Units Subcutaneous QAM   insulin NPH 15 Units Subcutaneous Nightly   insulin regular 8 Units Subcutaneous Daily With Breakfast & Dinner   levothyroxine 137 mcg Oral Q AM   penicillin g (potassium) 4 Million Units Intravenous Q4H         sodium chloride 9 mL/hr Last Rate: 9 mL/hr (04/09/18 0821)       ASSESSMENT:   1. Sepsis with streptococcal C infection from left foot cellulitis  2. Diabetic left foot ulcer  3. Uncontrolled diabetes mellitus  4. Syncope, negative cardiac/neurology workup  5. History of gout  6. Lactic acidosis on presentation  7. Thrombocytopenia  8. Hypokalemia  9. Hyponatremia    PLAN:  Will follow with the infectious disease recommendation, I would hold on the discharge plan at this point since she still have some swelling and she still have discomfort and we will reassess on a daily basis and then will transition to the oral Levaquin per infectious disease note once patient is ready for that step.  We'll reassess tomorrow and decide on the discharge  Patient has been afebrile for 24 hour which is reassuring  For any further adjustment on her insulin regimen to the endocrinology team  Diabetic foot wear was suggested by vascular surgery to reduce the chance of further problem with the foot ulcers down the road  Sodium level is doing better, we will discontinue the daily labs but will continue with the Accu-Cheks and sliding scale      Disposition: Consider discharge in next 1-2 days depending on overall status  According to patient and family the insurance company stated that her  has insurance, but she does not and she was supposed to be set up according to her 's employer.  There undergoing current investigation, but at this point she does not have insurance and needs help to afford her medications.  CCP did evaluation and 70/30 NovoLog may be the best option for affordability.  Will need follow-up  with PCP, podiatry and endocrinology     Jerry Russo MD  04/11/18  6:17 PM    Dictated utilizing Dragon dictation:  Much of this encounter note is an electronic transcription/translation of spoken language to printed text. The electronic translation of spoken language may permit erroneous, or at times, nonsensical words or phrases to be inadvertently transcribed; Although I have reviewed the note for such errors, some may still exist.

## 2018-04-11 NOTE — SIGNIFICANT NOTE
04/11/18 0831   Rehab Time/Intention   Evaluation Not Performed patient/family declined evaluation  (Patient requested us to check back this afternoon. Will follow up.)   Recommendation   PT - Next Appointment 04/11/18

## 2018-04-11 NOTE — THERAPY EVALUATION
Acute Care - Physical Therapy Initial Evaluation  Psychiatric     Patient Name: Elisabet Berry  : 1955  MRN: [id number]  Today's Date: 2018      Date of Referral to PT: 18  Referring Physician: Karan      Admit Date: 2018    Visit Dx:     ICD-10-CM ICD-9-CM   1. Generalized weakness R53.1 780.79   2. Hyperglycemia R73.9 790.29   3. Dehydration E86.0 276.51   4. Altered mental status, unspecified altered mental status type R41.82 780.97   5. Sepsis, due to unspecified organism A41.9 038.9     995.91     Patient Active Problem List   Diagnosis   • Generalized weakness     Past Medical History:   Diagnosis Date   • Arthritis    • Diabetes mellitus    • Gout    • Peripheral neuropathy      No past surgical history on file.     PT ASSESSMENT (last 12 hours)      Physical Therapy Evaluation     Row Name 18 1500          PT Evaluation Time/Intention    Subjective Information complains of;pain;fatigue;weakness  -MA     Document Type evaluation  -MA     Mode of Treatment physical therapy  -MA     Patient Effort adequate  -MA     Symptoms Noted During/After Treatment fatigue;increased pain  -MA     Row Name 18 1500          General Information    Patient Profile Reviewed? yes  -MA     Referring Physician Karan  -MA     Patient Observations alert;cooperative;agree to therapy  -MA     Patient/Family Observations UIC with legs elevated, L LE elevated, no exit alarm on, no acute distress noted at rest  -MA     Prior Level of Function independent:;gait;transfer;bed mobility  -MA     Equipment Currently Used at Home none  -MA     Pertinent History of Current Functional Problem Admission for hyperglycemia, AMS, L foot infection, generalized weakness  -MA     Existing Precautions/Restrictions fall   L LE-wbat  -MA     Risks Reviewed patient:  -MA     Benefits Reviewed patient:  -MA     Barriers to Rehab medically complex  -MA     Row Name 18 1500          Home Main Entrance    Number of  "Stairs, Main Entrance --   12  -MA     Stairs Comment, Main Entrance Patient verbalized she has multiple steps to enter and within home but plannign to return home with daugther. Daugther does not have any steps to enter if you take \"long way\". Discussed with patient that this may be her only option as she has difficulty advancing L LE.  -MA     Row Name 04/11/18 1500          Cognitive Assessment/Intervention- PT/OT    Orientation Status (Cognition) oriented x 4  -MA     Follows Commands (Cognition) verbal cues/prompting required  -MA     Safety Deficit (Cognitive) at risk behavior observed;insight into deficits/self awareness  -MA     Personal Safety Interventions fall prevention program maintained;gait belt;nonskid shoes/slippers when out of bed  -MA     Row Name 04/11/18 1500          Safety Issues, Functional Mobility    Safety Issues Affecting Function (Mobility) awareness of need for assistance;insight into deficits/self awareness  -MA     Impairments Affecting Function (Mobility) pain;strength;endurance/activity tolerance  -MA     Row Name 04/11/18 1500          Mobility Assessment/Treatment    Extremity Weight-bearing Status left lower extremity  -MA     Left Lower Extremity (Weight-bearing Status) weight-bearing as tolerated (WBAT)  -MA     Row Name 04/11/18 1500          Bed Mobility Assessment/Treatment    Bed Mobility Assessment/Treatment sit-supine  -MA     Sit-Supine [place] (Bed Mobility) supervision;verbal cues  -MA     Bed Mobility, Safety Issues decreased use of legs for bridging/pushing;impaired trunk control for bed mobility  -MA     Assistive Device (Bed Mobility) bed rails;head of bed elevated  -MA     Row Name 04/11/18 1500          Transfer Assessment/Treatment    Transfer Assessment/Treatment sit-stand transfer;stand-sit transfer  -MA     Comment (Transfers) Hand placement cues  -MA     Sit-Stand [place] (Transfers) stand by assist;verbal cues  -MA     Stand-Sit [place] " (Transfers) stand by assist;verbal cues  -MA     Row Name 04/11/18 1500          Sit-Stand Transfer    Assistive Device (Sit-Stand Transfers) walker, front-wheeled  -MA     Row Name 04/11/18 1500          Stand-Sit Transfer    Assistive Device (Stand-Sit Transfers) walker, front-wheeled  -MA     Row Name 04/11/18 1500          Gait/Stairs Assessment/Training    [place] Level (Gait) contact guard;verbal cues  -MA     Assistive Device (Gait) walker, front-wheeled  -MA     Distance in Feet (Gait) 5  -MA     Pattern (Gait) step-to  -MA     Deviations/Abnormal Patterns (Gait) antalgic;irene decreased  -MA     Right Sided Gait Deviations foot drop/toe drag;heel strike decreased  -MA     Comment (Gait/Stairs) Difficulty weight shifting through L LE and advancing when ambulation- distance limited 2' to pain  -MA     Row Name 04/11/18 1500          General ROM    GENERAL ROM COMMENTS B LE WFL  -[facility] 04/11/18 1500          General Assessment (Manual Muscle Testing)    Comment, General Manual Muscle Testing (MMT) Assessment B LE MMT 4+/5 grossly  -MA     Row Name 04/11/18 1500          Motor Assessment/Intervention    Additional Documentation Balance (Group)  -MA     Row Name 04/11/18 1500          Balance    Balance static sitting balance;static standing balance  -MA     Row Name 04/11/18 1500          Static Sitting Balance    Level of [place] (Unsupported Sitting, Static Balance) supervision  -MA     Sitting Position (Unsupported Sitting, Static Balance) sitting on edge of bed  -MA     Level of [place] (Supported Sitting, Static Balance) supervision  -MA     Sitting Position (Supported Sitting, Static Balance) sitting on edge of bed  -MA     Row Name 04/11/18 1500          Static Standing Balance    Level of [place] (Supported Standing, Static Balance) contact guard assist  -MA     Assistive Device Utilized (Supported Standing, Static Balance) rolling walker  -MA     Row Name 04/11/18 1500           Sensory Assessment/Intervention    Sensory General Assessment no sensation deficits identified  -MA     Row Name 04/11/18 1500          Vision Assessment/Intervention    Visual Impairment/Limitations WFL  -MA     Row Name 04/11/18 1500          Pain Assessment    Additional Documentation Pain Scale: FACES Pre/Post-Treatment (Group)  -MA     Row Name 04/11/18 1500          Pain Scale: Numbers Pre/Post-Treatment    Pain Location - Side Left  -MA     Pain Location - Orientation lower  -MA     Pain Location toe  -MA     Pain Intervention(s) Repositioned;Ambulation/increased activity;Rest  -MA     Row Name 04/11/18 1500          Pain Scale: FACES Pre/Post-Treatment    Pain: FACES Scale, Pretreatment 4-->hurts little more  -MA     Pain: FACES Scale, Post-Treatment 6-->hurts even more  -MA     Row Name             Wound 04/08/18 0015 Left toe diabetic/neuropathic ulceration    Wound - Properties Group Date first assessed: 04/08/18  -KA Time first assessed: 0015  -KA Present On Admission : yes  -KA Side: Left  -KA Location: toe  -KA, 3rd  Type: diabetic/neuropathic ulceration  -KA    Row Name             Wound 04/08/18 0015 occipital region laceration    Wound - Properties Group Date first assessed: 04/08/18  -KA Time first assessed: 0015  -KA Present On Admission : yes  -KA Location: occipital region  -KA Type: laceration  -KA Number of Staples Placed: 1  -KA    Row Name 04/11/18 1500          Plan of Care Review    Plan of Care Reviewed With patient  -MA     Row Name 04/11/18 1500          Physical Therapy Clinical Impression    Date of Referral to PT 04/11/18  -MA     PT Diagnosis (PT Clinical Impression) impaired funct mobility 2' to generalized weakness  -MA     Criteria for Skilled Interventions Met (PT Clinical Impression) yes;treatment indicated  -MA     Pathology/Pathophysiology Noted (Describe Specifically for Each System) musculoskeletal  -MA     Impairments Found (describe specific impairments) aerobic  capacity/endurance;ergonomics and body mechanics;gait, locomotion, and balance  -MA     Rehab Potential (PT Clinical Summary) fair, will monitor progress closely  -MA     Care Plan Review (PT) patient/other agree to care plan  -MA     Row Name 04/11/18 1500          Vital Signs    O2 Delivery Pre Treatment room air  -MA     O2 Delivery Post Treatment room air  -MA     Row Name 04/11/18 1500          Physical Therapy Goals    Bed Mobility Goal Selection (PT) bed mobility, PT goal 1  -MA     Transfer Goal Selection (PT) transfer, PT goal 1  -MA     Gait Training Goal Selection (PT) gait training, PT goal 1  -MA     Row Name 04/11/18 1500          Bed Mobility Goal 1 (PT)    Activity/Assistive Device (Bed Mobility Goal 1, PT) bed mobility activities, all  -MA     [place] Level/Cues Needed (Bed Mobility Goal 1, PT) supervision required  -MA     Time Frame (Bed Mobility Goal 1, PT) 1 week  -MA     Row Name 04/11/18 1500          Transfer Goal 1 (PT)    Activity/Assistive Device (Transfer Goal 1, PT) transfers, all;walker, rolling  -MA     [place] Level/Cues Needed (Transfer Goal 1, PT) supervision required  -MA     Time Frame (Transfer Goal 1, PT) 1 week  -MA     Row Name 04/11/18 1500          Gait Training Goal 1 (PT)    Activity/Assistive Device (Gait Training Goal 1, PT) walker, rolling  -MA     [place] Level (Gait Training Goal 1, PT) supervision required  -MA     Distance (Gait Goal 1, PT) 100  -MA     Time Frame (Gait Training Goal 1, PT) 1 week  -MA     Row Name 04/11/18 1500          Positioning and Restraints    Pre-Treatment Position sitting in chair/recliner  -MA     Post Treatment Position bed  -MA     In Bed call light within reach;encouraged to call for assist;exit alarm on;supine  -MA     Row Name 04/11/18 1500          Living Environment    Home Accessibility stairs to enter home  -MA       User Key  (r) = Recorded By, (t) = Taken By, (c) = Cosigned By    Initials Name Provider Type    KA  Abigail Quan, RN Registered Nurse    VLADISLAV Harris, PT Physical Therapist          Physical Therapy Education     Title: PT OT SLP Therapies (Active)     Topic: Physical Therapy (Active)     Point: Mobility training (Active)    Learning Progress Summary     Learner Status Readiness Method Response Comment Documented by    Patient Active Acceptance E NR  MA 04/11/18 1610          Point: Body mechanics (Active)    Learning Progress Summary     Learner Status Readiness Method Response Comment Documented by    Patient Active Acceptance E NR  MA 04/11/18 1610          Point: Precautions (Active)    Learning Progress Summary     Learner Status Readiness Method Response Comment Documented by    Patient Active Acceptance E NR  MA 04/11/18 1610                      User Key     Initials Effective Dates Name Provider Type Discipline    MA 04/03/18 -  Shyla Harris, PT Physical Therapist PT                PT Recommendation and Plan  Anticipated Discharge Disposition (PT): home or self care, home with home health care  Planned Therapy Interventions (PT Eval): balance training, bed mobility training, gait training, home exercise program, postural re-education, prosthetic fitting/training, stair training, strengthening, transfer training  Therapy Frequency (PT Clinical Impression): 5 times/wk  Outcome Summary/Treatment Plan (PT)  Anticipated Equipment Needs at Discharge (PT): front wheeled walker (pending insurance options and PT progress made)  Anticipated Discharge Disposition (PT): home or self care, home with home health care  Plan of Care Reviewed With: patient  Outcome Summary: Patient is a pleasant 62 y.o. female who presents with impaired functional mobility and endurance secondary to admission for hyperglycemia, L foot infection, and generalized weakness. Patient is ind with all ADLs and no prior use of AD. Patient verbalized she has multiple steps in living environment but planning to DC home with  daughter- access to home has option of no steps. All mobility required assist x1. Ambulation distance limited 2' to pain. May benefit from skilled PT services acutely to address deficits as able and improve level of independence.          Outcome Measures     Row Name 04/11/18 1600             How much help from another person do you currently need...    Turning from your back to your side while in flat bed without using bedrails? 4  -MA      Moving from lying on back to sitting on the side of a flat bed without bedrails? 4  -MA      Moving to and from a bed to a chair (including a wheelchair)? 3  -MA      Standing up from a chair using your arms (e.g., wheelchair, bedside chair)? 3  -MA      Climbing 3-5 steps with a railing? 2  -MA      To walk in hospital room? 3  -MA      AM-PAC 6 Clicks Score 19  -MA         Functional Assessment    Outcome Measure Options AM-PAC 6 Clicks Basic Mobility (PT)  -MA        User Key  (r) = Recorded By, (t) = Taken By, (c) = Cosigned By    Initials Name Provider Type    VLADISLAV Harris PT Physical Therapist           Time Calculation:         PT Charges     Row Name 04/11/18 1558 04/11/18 0831          Time Calculation    Start Time 1540  -MA  --     Stop Time 1558  -MA  --     Time Calculation (min) 18 min  -MA  --     PT Received On 04/11/18  -MA  --     PT - Next Appointment 04/12/18  -MA 04/11/18  -MA     PT Goal Re-Cert Due Date 04/18/18  -MA  --       User Key  (r) = Recorded By, (t) = Taken By, (c) = Cosigned By    Initials Name Provider Type    VLADISLAV Harris PT Physical Therapist          Therapy Charges for Today     Code Description Service Date Service Provider Modifiers Qty    67546284974 HC PT EVAL MOD COMPLEXITY 2 4/11/2018 Shyla Harris, PT GP 1    40033391911 HC PT THER PROC EA 15 MIN 4/11/2018 Shyla Harris PT GP 1          PT G-Codes  Outcome Measure Options: AM-PAC 6 Clicks Basic Mobility (PT)      hSyla Harris  PT  4/11/2018

## 2018-04-12 LAB
A PHAGOCYTOPH IGM TITR SER IF: NEGATIVE {TITER}
BACTERIA SPEC AEROBE CULT: NORMAL
CONV HGE IGG TITER: NEGATIVE
CREAT BLD-MCNC: 0.64 MG/DL (ref 0.57–1)
CRP SERPL-MCNC: 12.21 MG/DL (ref 0–0.5)
DEPRECATED RDW RBC AUTO: 48.7 FL (ref 37–54)
E CHAFFEENSIS IGG TITR SER IF: NEGATIVE {TITER}
E. CHAFFEENSIS (HME) IGM TITER: NEGATIVE
ERYTHROCYTE [DISTWIDTH] IN BLOOD BY AUTOMATED COUNT: 13.5 % (ref 11.7–13)
GFR SERPL CREATININE-BSD FRML MDRD: 94 ML/MIN/1.73
GLUCOSE BLDC GLUCOMTR-MCNC: 148 MG/DL (ref 70–130)
GLUCOSE BLDC GLUCOMTR-MCNC: 149 MG/DL (ref 70–130)
GLUCOSE BLDC GLUCOMTR-MCNC: 151 MG/DL (ref 70–130)
GLUCOSE BLDC GLUCOMTR-MCNC: 180 MG/DL (ref 70–130)
GLUCOSE BLDC GLUCOMTR-MCNC: 233 MG/DL (ref 70–130)
HCT VFR BLD AUTO: 35.1 % (ref 35.6–45.5)
HGB BLD-MCNC: 11.4 G/DL (ref 11.9–15.5)
MCH RBC QN AUTO: 32 PG (ref 26.9–32)
MCHC RBC AUTO-ENTMCNC: 32.5 G/DL (ref 32.4–36.3)
MCV RBC AUTO: 98.6 FL (ref 80.5–98.2)
PLATELET # BLD AUTO: 115 10*3/MM3 (ref 140–500)
PMV BLD AUTO: 10.7 FL (ref 6–12)
RBC # BLD AUTO: 3.56 10*6/MM3 (ref 3.9–5.2)
WBC NRBC COR # BLD: 7.11 10*3/MM3 (ref 4.5–10.7)

## 2018-04-12 PROCEDURE — 85027 COMPLETE CBC AUTOMATED: CPT | Performed by: INTERNAL MEDICINE

## 2018-04-12 PROCEDURE — 82962 GLUCOSE BLOOD TEST: CPT

## 2018-04-12 PROCEDURE — 25010000002 PENICILLIN G POTASSIUM PER 600000 UNITS: Performed by: INTERNAL MEDICINE

## 2018-04-12 PROCEDURE — 99233 SBSQ HOSP IP/OBS HIGH 50: CPT | Performed by: INTERNAL MEDICINE

## 2018-04-12 PROCEDURE — 99232 SBSQ HOSP IP/OBS MODERATE 35: CPT | Performed by: INTERNAL MEDICINE

## 2018-04-12 PROCEDURE — 63710000001 INSULIN ASPART PER 5 UNITS: Performed by: INTERNAL MEDICINE

## 2018-04-12 PROCEDURE — 82565 ASSAY OF CREATININE: CPT | Performed by: INTERNAL MEDICINE

## 2018-04-12 PROCEDURE — 63710000001 INSULIN ISOPHANE HUMAN PER 5 UNITS: Performed by: INTERNAL MEDICINE

## 2018-04-12 PROCEDURE — 86140 C-REACTIVE PROTEIN: CPT | Performed by: INTERNAL MEDICINE

## 2018-04-12 PROCEDURE — 97110 THERAPEUTIC EXERCISES: CPT

## 2018-04-12 PROCEDURE — 25010000003 PENICILLIN G POTASSIUM PER 600000 UNITS: Performed by: INTERNAL MEDICINE

## 2018-04-12 RX ORDER — LEVOFLOXACIN 750 MG/1
750 TABLET ORAL EVERY 24 HOURS
Status: DISCONTINUED | OUTPATIENT
Start: 2018-04-12 | End: 2018-04-17 | Stop reason: HOSPADM

## 2018-04-12 RX ORDER — ALLOPURINOL 100 MG/1
200 TABLET ORAL DAILY
Status: DISCONTINUED | OUTPATIENT
Start: 2018-04-12 | End: 2018-04-13

## 2018-04-12 RX ADMIN — SODIUM CHLORIDE 4 MILLION UNITS: 9 INJECTION, SOLUTION INTRAVENOUS at 04:03

## 2018-04-12 RX ADMIN — GABAPENTIN 600 MG: 300 CAPSULE ORAL at 05:05

## 2018-04-12 RX ADMIN — GABAPENTIN 600 MG: 300 CAPSULE ORAL at 21:21

## 2018-04-12 RX ADMIN — HUMAN INSULIN 8 UNITS: 100 INJECTION, SOLUTION SUBCUTANEOUS at 10:34

## 2018-04-12 RX ADMIN — ACETAMINOPHEN 650 MG: 325 TABLET ORAL at 11:31

## 2018-04-12 RX ADMIN — ALLOPURINOL 200 MG: 100 TABLET ORAL at 21:20

## 2018-04-12 RX ADMIN — SODIUM CHLORIDE 4 MILLION UNITS: 9 INJECTION, SOLUTION INTRAVENOUS at 09:30

## 2018-04-12 RX ADMIN — ACETAMINOPHEN 650 MG: 325 TABLET ORAL at 05:02

## 2018-04-12 RX ADMIN — SODIUM CHLORIDE 4 MILLION UNITS: 9 INJECTION, SOLUTION INTRAVENOUS at 00:24

## 2018-04-12 RX ADMIN — HUMAN INSULIN 15 UNITS: 100 INJECTION, SUSPENSION SUBCUTANEOUS at 21:21

## 2018-04-12 RX ADMIN — HUMAN INSULIN 15 UNITS: 100 INJECTION, SUSPENSION SUBCUTANEOUS at 10:34

## 2018-04-12 RX ADMIN — INSULIN ASPART 3 UNITS: 100 INJECTION, SOLUTION INTRAVENOUS; SUBCUTANEOUS at 13:14

## 2018-04-12 RX ADMIN — HUMAN INSULIN 8 UNITS: 100 INJECTION, SOLUTION SUBCUTANEOUS at 17:49

## 2018-04-12 RX ADMIN — ACETAMINOPHEN 650 MG: 325 TABLET ORAL at 21:20

## 2018-04-12 RX ADMIN — LEVOTHYROXINE SODIUM 137 MCG: 137 TABLET ORAL at 09:30

## 2018-04-12 RX ADMIN — INSULIN ASPART 2 UNITS: 100 INJECTION, SOLUTION INTRAVENOUS; SUBCUTANEOUS at 21:21

## 2018-04-12 RX ADMIN — GABAPENTIN 600 MG: 300 CAPSULE ORAL at 13:14

## 2018-04-12 RX ADMIN — LEVOFLOXACIN 750 MG: 750 TABLET, FILM COATED ORAL at 13:14

## 2018-04-12 NOTE — PROGRESS NOTES
62 y.o.   LOS: 5 days   Patient Care Team:  Christine Phelps DO as PCP - General (Family Medicine)    Chief Complaint:  Uncontrolled type 2 diabetes mellitus    Chief Complaint   Patient presents with   • Fall     multiple falls today. last fall, hit head. small abrasion with hematoma to back of head. denies loc. denies blood thinner use. last normal was 1330 today.        Subjective     HPI  Patient appears to tolerated the insulin regimen so far  Blood sugars appear to be in the 140-220 range  Patient is currently receiving NPH and regular insulin twice a day dosing        Interval History     Patient is a 62-year-old white female with a past history of that medicine hypertension admitted to the hospital with altered mental status multiple falls and injury to the head and is currently being treated for cellulitis left leg and is currently on antibiotics  Patient's blood sugars were noted to be in the 200-400 range  Her hemoglobin A1c was 10.8 and was consulted for further managing patient's diabetes     Patient's mental status apparently improved as per the daughter at the bedside  Patient is able to answer questions  She reported that she's been diabetic for more than 20 years.  Until approximately 5 years ago she was working and was able to get care  She remembers being on toujeo 60 units daily along with victoza 1.8 mg daily.  She reports that the blood sugars used to be under control  She lost her job approximately 3 years ago and was managing with samples from the doctor's office.  She could not get consistent help with samples and goes through periods of no medication at all  Patient has not been checking her Accu-Cheks but reports that her blood sugars could range from 100-300 range sometimes higher     Patient reports symptoms of diabetic peripheral neuropathy and is currently on gabapentin  Patient denied any knowledge of diabetic nephropathy  She denied any knowledge of diabetic retinopathy even though  her last eye examination was about 2-3 years ago.     At the time of admission patient was noted to be in acute renal failure CT scan of the head was negative for any acute pathology next line patient is currently under nephrology care as well as intensive care specialists     Patient reported significant cost issues with the Levemir and victoza and was not able to use it without samples.   apparently works in construction and was supposed to have insurance but they're still trying to verify that coverage  Review of Systems:      Review of Systems   Eyes: Negative.    Respiratory: Negative.    Cardiovascular: Negative.    Gastrointestinal: Positive for abdominal distention.   Psychiatric/Behavioral: Negative.    All other systems reviewed and are negative.    Objective     Vital Signs   Temp:  [97.8 °F (36.6 °C)-99.5 °F (37.5 °C)] 99.5 °F (37.5 °C)  Heart Rate:  [68-81] 81  Resp:  [18] 18  BP: (107-123)/(54-72) 122/61    Physical Exam:  Physical Exam   Eyes: EOM are normal. Pupils are equal, round, and reactive to light.   Neck: Normal range of motion. Neck supple.   Cardiovascular: Normal rate, regular rhythm, normal heart sounds and intact distal pulses.    Pulmonary/Chest: Effort normal and breath sounds normal.   Abdominal: Soft. Bowel sounds are normal.   Musculoskeletal: Normal range of motion. She exhibits no edema.   Neurological: She is alert.   Skin: Skin is warm and dry.   Psychiatric: She has a normal mood and affect. Her behavior is normal.   Nursing note and vitals reviewed.  Results Review:     I reviewed the patient's new clinical results.      Glucose   Date/Time Value Ref Range Status   04/11/2018 0523 172 (H) 65 - 99 mg/dL Final   04/10/2018 0520 190 (H) 65 - 99 mg/dL Final     Lab Results (last 72 hours)     Procedure Component Value Units Date/Time    POC Glucose Once [[id number]]  (Abnormal) Collected:  04/12/18 0805    Specimen:  Blood Updated:  04/12/18 0808     Glucose 148 (H) mg/dL      Narrative:       Meter: [id number] : [id number] Chip Barton CNA    C-reactive Protein [[id number]]  (Abnormal) Collected:  04/12/18 0457    Specimen:  Blood Updated:  04/12/18 0603     C-Reactive Protein 12.21 (H) mg/dL     Creatinine, Serum [[id number]]  (Normal) Collected:  04/12/18 0457    Specimen:  Blood Updated:  04/12/18 0603     Creatinine 0.64 mg/dL      eGFR Non African Amer 94 mL/min/1.73     CBC (No Diff) [[id number]]  (Abnormal) Collected:  04/12/18 0457    Specimen:  Blood Updated:  04/12/18 0544     WBC 7.11 10*3/mm3      RBC 3.56 (L) 10*6/mm3      Hemoglobin 11.4 (L) g/dL      Hematocrit 35.1 (L) %      MCV 98.6 (H) fL      MCH 32.0 pg      MCHC 32.5 g/dL      RDW 13.5 (H) %      RDW-SD 48.7 fl      MPV 10.7 fL      Platelets 115 (L) 10*3/mm3     Blood Culture - Blood, [[id number]]  (Normal) Collected:  04/07/18 2213    Specimen:  Blood from Hand, Left Updated:  04/11/18 2231     Blood Culture No growth at 4 days    POC Glucose Once [[id number]]  (Abnormal) Collected:  04/11/18 2015    Specimen:  Blood Updated:  04/11/18 2021     Glucose 163 (H) mg/dL     Narrative:       Meter: [id number] : [id number] Brandon ZAMORA    Babesia Microti Antibody Panel [[id number]] Collected:  04/08/18 1051    Specimen:  Blood Updated:  04/11/18 1908     Babesia microti IgM <1:10     Babesia microti IgG <1:10     Comment: This test was developed and its performance characteristics determined  by [employer]. It has not been cleared or approved by the  [place]. Food and Drug Administration. The FDA has determined that such  clearance or approval is not necessary. This test is used for clinical  purposes. It should not be regarded as investigational or research.       Narrative:       Performed at:   - [address]  [id number]  : Demian Batista MD, Phone:  [phone]    POC Glucose Once [[id number]]  (Abnormal) Collected:  04/11/18 1612    Specimen:   Blood Updated:  04/11/18 1622     Glucose 210 (H) mg/dL     Narrative:       Meter: [id number] : [id number] Akpaglo Shen NA    POC Glucose Once [[id number]]  (Abnormal) Collected:  04/11/18 1117    Specimen:  Blood Updated:  04/11/18 1121     Glucose 229 (H) mg/dL     Narrative:       Meter: [id number] : [id number] Akpaglo Shen NA    Blood Culture - Blood, [[id number]]  (Normal) Collected:  04/09/18 0930    Specimen:  Blood from Arm, Left Updated:  04/11/18 0946     Blood Culture No growth at 2 days    Blood Culture - Blood, [[id number]]  (Normal) Collected:  04/09/18 0848    Specimen:  Blood from Hand, Left Updated:  04/11/18 0931     Blood Culture No growth at 2 days    [place] Spotted Fever, IgM [[id number]] Collected:  04/08/18 1051    Specimen:  Blood Updated:  04/11/18 0717     RMSF IgM 0.44 index      Comment:                                  Negative        <0.90                                   Equivocal 0.90 - 1.10                                   Positive        >1.10       Narrative:       Performed at:  [address] [place] [address]  [id number]  : Demian Batista MD, Phone:  [phone]    Basic Metabolic Panel [[id number]]  (Abnormal) Collected:  04/11/18 0523    Specimen:  Blood Updated:  04/11/18 0623     Glucose 172 (H) mg/dL      BUN 25 (H) mg/dL      Creatinine 0.74 mg/dL      Sodium 134 (L) mmol/L      Potassium 3.7 mmol/L      Chloride 100 mmol/L      CO2 25.6 mmol/L      Calcium 8.7 mg/dL      eGFR Non African Amer 79 mL/min/1.73      BUN/Creatinine Ratio 33.8 (H)     Anion Gap 8.4 mmol/L     Narrative:       GFR Normal >60  Chronic Kidney Disease <60  Kidney Failure <15    [place] Spotted Fever, IgG [[id number]] Collected:  04/08/18 1051    Specimen:  Blood Updated:  04/11/18 0617     RMSF IgG Negative    Narrative:       Performed at:  [address] [place] [address]  [id number]  : Demian Batista MD,  Phone:  [phone]    CBC (No Diff) [[id number]]  (Abnormal) Collected:  04/11/18 0523    Specimen:  Blood Updated:  04/11/18 0603     WBC 6.10 10*3/mm3      RBC 3.44 (L) 10*6/mm3      Hemoglobin 10.9 (L) g/dL      Hematocrit 33.6 (L) %      MCV 97.7 fL      MCH 31.7 pg      MCHC 32.4 g/dL      RDW 13.4 (H) %      RDW-SD 47.9 fl      MPV 10.9 fL      Platelets 91 (L) 10*3/mm3     POC Glucose Once [[id number]]  (Abnormal) Collected:  04/11/18 0501    Specimen:  Blood Updated:  04/11/18 0502     Glucose 153 (H) mg/dL     Narrative:       Meter: [id number] : [id number] [employer] NA    POC Glucose Once [[id number]]  (Abnormal) Collected:  04/10/18 2223    Specimen:  Blood Updated:  04/10/18 2224     Glucose 221 (H) mg/dL     Narrative:       Meter: [id number] : [id number] Elfalan [employer] NA    POC Glucose Once [[id number]]  (Abnormal) Collected:  04/10/18 1613    Specimen:  Blood Updated:  04/10/18 1614     Glucose 214 (H) mg/dL     Narrative:       Meter: [id number] : [id number] Shaji Damon CNA    B. [place]dorferi Antibodies, WB Reflex [[id number]] Collected:  04/08/18 1051    Specimen:  Blood Updated:  04/10/18 1311     Lyme Ab IgG/IgM <0.91 ISR      Comment:                                 Negative         <0.91                                  Equivocal  0.91 - 1.09                                  Positive         >1.09        Lyme Disease Ab, Quant, IgM <0.80 index      Comment:                                 Negative         <0.80                                  Equivocal  0.80 - 1.19                                  Positive         >1.19   IgM levels may peak at 3-6 weeks post infection, then   gradually decline.       Narrative:       Performed at:  [address] [place] [address]  [id number]  : Raulito Leonard PhD, Phone:  [phone]    POC Glucose Once [[id number]]  (Abnormal) Collected:  04/10/18 1124    Specimen:  Blood Updated:  04/10/18 1133      Glucose 209 (H) mg/dL     Narrative:       Meter: [id number] : [id number] Shaji Damon CNA    Blood Culture - Blood, [[id number]]  (Abnormal)  (Susceptibility) Collected:  04/07/18 2250    Specimen:  Blood from Arm, Left Updated:  04/10/18 0638     Blood Culture --      Streptococcus, Beta Hemolytic, Group C (A)     Isolated from Aerobic and Anaerobic Bottles     STREP GROUPING C     Gram Stain Result Anaerobic Bottle Gram positive cocci in chains      Aerobic Bottle Gram positive cocci in chains    Susceptibility      Streptococcus, Beta Hemolytic, Group C     GRETCHEN     Ceftriaxone <=0.12 ug/ml Susceptible     Clindamycin <=0.25 ug/ml Susceptible     Erythromycin <=0.12 ug/ml Susceptible     Levofloxacin 0.5 ug/ml Susceptible     Penicillin G <=0.06 ug/ml Susceptible     Vancomycin <=0.12 ug/ml Susceptible                    POC Glucose Once [[id number]]  (Abnormal) Collected:  04/10/18 0603    Specimen:  Blood Updated:  04/10/18 0614     Glucose 187 (H) mg/dL     Narrative:       Meter: [id number] : [id number] Amina Sorto CNA    Basic Metabolic Panel [[id number]]  (Abnormal) Collected:  04/10/18 0520    Specimen:  Blood Updated:  04/10/18 0605     Glucose 190 (H) mg/dL      BUN 27 (H) mg/dL      Creatinine 0.78 mg/dL      Sodium 135 (L) mmol/L      Potassium 3.3 (L) mmol/L      Chloride 101 mmol/L      CO2 22.6 mmol/L      Calcium 8.4 (L) mg/dL      eGFR Non African Amer 75 mL/min/1.73      BUN/Creatinine Ratio 34.6 (H)     Anion Gap 11.4 mmol/L     Narrative:       GFR Normal >60  Chronic Kidney Disease <60  Kidney Failure <15    CBC (No Diff) [[id number]]  (Abnormal) Collected:  04/10/18 0520    Specimen:  Blood Updated:  04/10/18 0603     WBC 6.23 10*3/mm3      RBC 3.44 (L) 10*6/mm3      Hemoglobin 10.9 (L) g/dL      Hematocrit 33.4 (L) %      MCV 97.1 fL      MCH 31.7 pg      MCHC 32.6 g/dL      RDW 13.5 (H) %      RDW-SD 47.8 fl      MPV 11.2 fL      Platelets 83 (L) 10*3/mm3     POC Glucose Once  [[id number]]  (Abnormal) Collected:  04/09/18 2047    Specimen:  Blood Updated:  04/09/18 2048     Glucose 208 (H) mg/dL     Narrative:       Meter: [id number] : [id number] India Huynh RN    POC Glucose Once [[id number]]  (Abnormal) Collected:  04/09/18 1639    Specimen:  Blood Updated:  04/09/18 1642     Glucose 179 (H) mg/dL     Narrative:       Meter: [id number] : [id number] Anup Arlette NA    Immature Platelet Fraction [[id number]]  (Abnormal) Collected:  04/09/18 1158    Specimen:  Blood Updated:  04/09/18 1214     IPF 4.50 %      Platelets 75 (L) 10*3/mm3     POC Glucose Once [[id number]]  (Abnormal) Collected:  04/09/18 1123    Specimen:  Blood Updated:  04/09/18 1126     Glucose 254 (H) mg/dL     Narrative:       Meter: [id number] : [id number] Martin ZAMORA        Imaging Results (last 72 hours)     Procedure Component Value Units Date/Time    MRI Foot Right Without Contrast [[id number]] Collected:  04/09/18 1849     Updated:  04/10/18 0755    Narrative:       RIGHT MID AND FOREFOOT MRI WITHOUT CONTRAST     HISTORY: Soft tissue swelling. Evaluate for infection.     Limited MRI of the right mid and forefoot was performed using short axis  STIR and T1 images. The patient had a hard time holding still for the  exam which had to be discontinued without completing the entire  protocol. Left mid and forefoot MRI was performed yesterday and is  reported separately.     FINDINGS: There is soft tissue edema in the right mid and forefoot.  There is no bone marrow signal abnormality around the midfoot or the  forefoot to suggest osteomyelitis. Foot musculature is deconditioned but  not frankly atrophic. No focal fluid collection is identified.       Impression:       Right forefoot MRI is somewhat limited but there is no  findings suspicious of osteomyelitis or abscess.     This report was finalized on 4/10/2018 7:52 AM by Dr. Nagi Avalos MD.       MRI Brain Without Contrast [[id number]] Collected:   04/08/18 1933     Updated:  04/10/18 0547    Narrative:       BRAIN MRI WITHOUT GADOLINIUM     HISTORY: Confusion/delirium, altered LOC, unexplained.     COMPARISON: None.     FINDINGS:  Multiplanar images of the head were obtained without the use  of intravenous contrast.     There is a mild degree of generalized atrophy. There are a few scattered  foci of increased T2 and FLAIR signal abnormality in the white matter  bilaterally most likely sequelae of mild/early ischemic gliotic change.  No foci of restricted diffusion are demonstrated on diffusion weighted  images (DWI) to suggest acute ischemia. Remainder of the brain  parenchyma is unremarkable. The ventricular system is normal in caliber  and configuration for age. Midline structures corpus callosum, pituitary  gland, and brainstem are unremarkable. There is no evidence of extra  axial mass or fluid collection. The major vascular flow voids are  patent. The orbital and encompassed paranasal soft tissues are  unremarkable. Osseous marrow signal intensity is within normal limits.     Preliminary interpretation telephoned directly to Dr. Marquez during  interpretation at 7:19 PM.                Impression:       1. Very mild atrophy and chronic appearing ischemic gliotic changes, no  acute intracranial finding     This report was finalized on 4/10/2018 5:43 AM by Gian Vance MD.       MRI Foot Left With & Without Contrast [[id number]] Collected:  04/09/18 1011     Updated:  04/09/18 1507    Narrative:       LEFT MID AND FOREFOOT MRI WITH AND WITHOUT CONTRAST     HISTORY: Sepsis with cellulitis and swelling at the 3rd toe.     TECHNIQUE: MRI of the left mid and forefoot was performed with and  without IV contrast. There is no previous exam for comparison.     FINDINGS: Marrow signal throughout the mid and forefoot is normal.  Specifically, there is no marrow signal abnormality of the 3rd toe.  There is soft tissue edema throughout the mid and forefoot with  soft  tissue enhancement along the 3rd toe. No fluid collection is observed at  the 3rd toe. The interphalangeal joints and the metatarsophalangeal  joints demonstrate normal amount of fluid and no abnormal contrast  enhancement.     There is some degenerative change at the 1st and 2nd tarsometatarsal  joints with a small volume of fluid around the plantar edge of the 1st  tarsometatarsal joint. There is also a small volume of fluid in the  flexor hallucis longus tendon sheath at the same level. Some soft tissue  edema and enhancement is observed along the medial plantar surface of  the foot in the same region as the small fluid collections. There is no  marrow signal abnormality around the 1st metatarsophalangeal joint,  however. No other fluid collection is identified in the mid or forefoot.  The flexor and extensor tendons appear intact.       Impression:       Cellulitis at the 3rd toe without evidence of osteomyelitis  or septic arthritis.     There is some fluid in the flexor hallucis longus tendon sheath and  plantar to the 1st metatarsophalangeal joint, favored to be arising from  that joint, with some soft tissue enhancement along the plantar surface  of the foot in the same region. Given the clinical scenario, it is  possible that these changes are infectious. There is no evidence of  osteomyelitis around the visualized midfoot. The entire hindfoot is not  included in this exam.     This report was finalized on 4/9/2018 3:04 PM by Dr. Nagi Avalos MD.             Medication Review:       Current Facility-Administered Medications:   •  acetaminophen (TYLENOL) suppository 650 mg, 650 mg, Rectal, Q4H PRN, Christian Mcknight MD, 650 mg at 04/08/18 2019  •  acetaminophen (TYLENOL) tablet 650 mg, 650 mg, Oral, Q6H PRN, Maria Luisa Acuna MD, 650 mg at 04/12/18 0502  •  dextrose (D50W) solution 25 g, 25 g, Intravenous, Q15 Min PRN, Christian Mcknight MD  •  dextrose (GLUTOSE) oral gel 15 g, 15 g, Oral, Q15 Min  PRN, Christian Mcknight MD  •  gabapentin (NEURONTIN) capsule 600 mg, 600 mg, Oral, Q8H, Jerry Russo MD, 600 mg at 04/12/18 0505  •  gadobenate dimeglumine (MULTIHANCE) injection 20 mL, 20 mL, Intravenous, Once in imaging, Paul Key MD  •  glucagon (human recombinant) (GLUCAGEN DIAGNOSTIC) injection 1 mg, 1 mg, Subcutaneous, PRN, Christian Mcknight MD  •  insulin aspart (novoLOG) injection 2-5 Units, 2-5 Units, Subcutaneous, 4x Daily AC & at Bedtime, Mike DING MD, 2 Units at 04/11/18 2100  •  insulin NPH (humuLIN N,novoLIN N) injection 15 Units, 15 Units, Subcutaneous, QAM, Mike DING MD, 15 Units at 04/11/18 1116  •  insulin NPH (humuLIN N,novoLIN N) injection 15 Units, 15 Units, Subcutaneous, Nightly, Mike DING MD  •  insulin regular (HumuLIN R,NovoLIN R) injection 8 Units, 8 Units, Subcutaneous, Daily With Breakfast & Dinner, Mike DING MD, 8 Units at 04/11/18 1743  •  levothyroxine (SYNTHROID, LEVOTHROID) tablet 137 mcg, 137 mcg, Oral, Q AM, Mike DING MD, 137 mcg at 04/11/18 0654  •  morphine injection 2 mg, 2 mg, Intravenous, Q3H PRN, Paul Key MD, 2 mg at 04/08/18 1004  •  morphine injection 4 mg, 4 mg, Intravenous, Q4H PRN, Paul Key MD  •  ondansetron (ZOFRAN) injection 4 mg, 4 mg, Intravenous, Q4H PRN, Maria Luisa Acuna MD, 4 mg at 04/09/18 1450  •  penicillin G potassium 4 Million Units in sodium chloride 0.9 % 100 mL IVPB, 4 Million Units, Intravenous, Q4H, Kalyan Deleon MD, 4 Million Units at 04/12/18 0403  •  potassium chloride (MICRO-K) CR capsule 40 mEq, 40 mEq, Oral, PRN, 40 mEq at 04/10/18 1409 **OR** potassium chloride (KLOR-CON) packet 40 mEq, 40 mEq, Oral, PRN **OR** potassium chloride 10 mEq in 100 mL IVPB, 10 mEq, Intravenous, Q1H PRN, Paul Key MD  •  potassium chloride 10 mEq in 100 mL IVPB, 10 mEq, Intravenous, Q1H PRN, Maria Luisa Acuna MD, Last Rate: 100 mL/hr at 04/09/18 1458, 10 mEq at  04/09/18 1458  •  sodium chloride 0.9 % flush 1-10 mL, 1-10 mL, Intravenous, PRN, Christian Mcknight MD  •  sodium chloride 0.9 % flush 10 mL, 10 mL, Intravenous, PRN, Antolin Montero MD  •  sodium chloride 0.9 % infusion, 9 mL/hr, Intravenous, Continuous, Maria Luisa Acuna MD, Last Rate: 9 mL/hr at 04/09/18 0821, 9 mL/hr at 04/09/18 0821    Assessment/Plan     Patient Active Problem List   Diagnosis   • Generalized weakness   Uncontrolled type 2 diabetes mellitus with hemoglobin A1c of 10.8%  Uncontrolled type 2 diabetes mellitus with diabetic peripheral neuropathy  Altered mental status  Hypothyroidism on medication  Patient is not clear about the dose I advised the daughter to go home to verify the dose so that she may be started on levothyroxine  Obesity      I discussed further management of patient's diabetes  I discussed the insulin management as well as oral medication  Patient cannot afford insulin pens at this point and wants a cheaper option     Patient recalls using Invokamet.  She tried metformin but which caused stomach issues  She does not recall the names of other medication     Patient's blood sugars have stabilized in the 100-200 range  Patient reports that she still cannot think she can afford Levemir and NovoLog regimen at home  She has used insulin syringes in the past  She is comfortable taking insulin injections     I discussed the use of NPH and regular insulin twice daily  Patient reported that she typically eats brunch and supper  Currently she is eating 3 meals daily during this hospitalization     Patient is currently receiving NPH insulin 15 units in the morning and at bedtime  She is also receiving 8 units of regular insulin at breakfast and suppertime  She is also on a sliding scale  Most of the blood sugars are in the 100-200 range so far  Patient may be able to use this plan at home after discharge  Patient verbalized understanding and is comfortable with that    The total time spent  "for old record and lab review and floor time was more than 35 min of which greater than 20 min of time  ( greater than 50% of the total time )  was spent face to face with the patient counseling and coordination of care owas spent on counseling the patient on recommended evaluation and treatment options, instructions for management/treatment and /or follow up  and importance of compliance with chosen management or treatment options      Mike Chin MD FACE.  04/12/18  9:21 AM      EMR Dragon / transcription disclaimer:     \"Dictated utilizing Dragon dictation\".   "

## 2018-04-12 NOTE — PLAN OF CARE
Problem: Patient Care Overview  Goal: Plan of Care Review  Outcome: Ongoing (interventions implemented as appropriate)   04/12/18 0559   Coping/Psychosocial   Plan of Care Reviewed With patient   Plan of Care Review   Progress no change       Problem: Skin Injury Risk (Adult)  Goal: Skin Health and Integrity  Outcome: Ongoing (interventions implemented as appropriate)   04/12/18 0559   Skin Injury Risk (Adult)   Skin Health and Integrity making progress toward outcome       Problem: Fall Risk (Adult)  Goal: Absence of Fall  Outcome: Ongoing (interventions implemented as appropriate)   04/12/18 0559   Fall Risk (Adult)   Absence of Fall making progress toward outcome       Problem: Pain, Acute (Adult)  Goal: Acceptable Pain Control/Comfort Level  Outcome: Ongoing (interventions implemented as appropriate)   04/12/18 0559   Pain, Acute (Adult)   Acceptable Pain Control/Comfort Level making progress toward outcome

## 2018-04-12 NOTE — PLAN OF CARE
Problem: Patient Care Overview  Goal: Plan of Care Review   04/12/18 1044   Coping/Psychosocial   Plan of Care Reviewed With patient   Plan of Care Review   Progress improving   OTHER   Outcome Summary Improved tolerance to activity through increased ambulation distance of 30' with RW. Increased weight-shifting to L LE noted with ambulation. Ambulates well with walker-patient motivated today. Will continue to progress as able.

## 2018-04-12 NOTE — NURSING NOTE
Pt seen for home care wound recommendations per Dr. Deleon's request.  Pt has dry black eschar on tip of left 3rd toe; vascular has written for betadine to area which is recommended  for pt to continue at home.  Discussed option of pt following up at  A wound care center near her home in [place], [place] once she has insurance.  Recommend elevating left leg more as left foot quite edematous and painful with mild erythema noted.  Pt states she usually take gout medication but has not been on it since she was admitted; pt wondering if she may be having a gout flare causing the pain and edema in her left foot.  Discussed with primary RN

## 2018-04-12 NOTE — PROGRESS NOTES
LOS: 5 days   Patient Care Team:  Christine Phelps DO as PCP - General (Family Medicine)    Chief Complaint: Strep bacteremia       Interval History: No cardiac complaints.  Getting abx for strep bacteremia from foot ulcer.  She remains afebrile and stable.    Objective   Vital Signs  Temp:  [97.8 °F (36.6 °C)-99.5 °F (37.5 °C)] 98.3 °F (36.8 °C)  Heart Rate:  [68-81] 68  Resp:  [18] 18  BP: (107-123)/(54-72) 121/67  No intake or output data in the 24 hours ending 04/12/18 0959    Comfortable NAD  PERRL, conjunctiva clear  Neck supple, no JVD or thyromegaly appreciated  S1/S2 RRR, no m/r/g  Lungs CTA B, normal effort  Abdomen S/NT/ND (+) BS, no HSM appreciated  Extremities:  L toe ulcer  No visible or palpable skin lesions  A/Ox4, mood and affect appropriate    Results Review:        Results from last 7 days  Lab Units 04/12/18 0457 04/11/18  0523 04/10/18  0520 04/09/18  0402   SODIUM mmol/L  --  134* 135* 139   POTASSIUM mmol/L  --  3.7 3.3* 3.4*   CHLORIDE mmol/L  --  100 101 102   CO2 mmol/L  --  25.6 22.6 20.0*   BUN mg/dL  --  25* 27* 25*   CREATININE mg/dL 0.64 0.74 0.78 0.93   GLUCOSE mg/dL  --  172* 190* 205*   CALCIUM mg/dL  --  8.7 8.4* 8.5*       Results from last 7 days  Lab Units 04/09/18  0402 04/08/18  1604 04/08/18  1051  04/07/18  1858   CK TOTAL U/L  --   --   --   --  62   TROPONIN T ng/mL <0.010 <0.010 <0.010  < > <0.010   < > = values in this interval not displayed.    Results from last 7 days  Lab Units 04/12/18  0457 04/11/18  0523 04/10/18  0520   WBC 10*3/mm3 7.11 6.10 6.23   HEMOGLOBIN g/dL 11.4* 10.9* 10.9*   HEMATOCRIT % 35.1* 33.6* 33.4*   PLATELETS 10*3/mm3 115* 91* 83*               Results from last 7 days  Lab Units 04/07/18  1858   MAGNESIUM mg/dL 1.8           I reviewed the patient's new clinical results.  I personally viewed and interpreted the patient's EKG/Telemetry data        Medication Review:     gabapentin 600 mg Oral Q8H   gadobenate dimeglumine 20 mL Intravenous  Once in imaging   insulin aspart 2-5 Units Subcutaneous 4x Daily AC & at Bedtime   insulin NPH 15 Units Subcutaneous QAM   insulin NPH 15 Units Subcutaneous Nightly   insulin regular 8 Units Subcutaneous Daily With Breakfast & Dinner   levothyroxine 137 mcg Oral Q AM   penicillin g (potassium) 4 Million Units Intravenous Q4H         sodium chloride 9 mL/hr Last Rate: 9 mL/hr (04/09/18 0821)       Assessment/Plan     Active Problems:    Generalized weakness    Strep bacteremia.  On abx.  Her mental status is back to normal.  No clear evidence of endocarditis.  I would hold off on JAYE unless she shows signs of worsening.    She is likely going home today or tomorrow.    Harpreet Dorantes MD  04/12/18  9:59 AM

## 2018-04-12 NOTE — PROGRESS NOTES
PROGRESS NOTE  Patient Name: Elisabet Berry  Age/Sex: 62 y.o. female  : 1955  MRN: [id number]    Date of Admission: 2018  Date of Encounter Visit: 18   LOS: 5 days   Patient Care Team:  Christine Phelps DO as PCP - General (Family Medicine)    Chief Complaint: Left leg cellulitis    Interval History: Patient came in with syncope with no evidence of stroke on her workup. she does have cellulitis of her left lower extremity and is being followed by infectious disease and vascular surgery.  Patient did have sepsis due to strep group C septicemia secondary to left foot cellulitis from diabetic foot ulcer.  MRI was negative for deep infection, but there were some tendon changes with no fluid collection or other sign of osteomyelitis.  Initially treated with Rocephin 2 g, but was switched to penicillin 4 million units every 4 hours on 4/10/18 with plan to transition to Levaquin 750 mg by mouth every 24 hours until 18.  Repeat blood cultures have been negative.  She was complaining of shoulder and leg pain and her home Neurontin was continued as well as when necessary narcotic.  She has a history of diabetes and has been relying on samples for various drugs as outpatient.  Endocrinology was consulted and she was treated with insulin therapy and will consider affordable options at discharge given that she may not have insurance at this time.  Patient was evaluated by vascular surgery and at this point no intervention or amputation is necessary.  Patient is alert with no more neurological symptoms or recurrent syncope  Patient still complaining of discomfort from the leg and it still warm to touch but it is improving.  Patient was switched to oral antibiotic on the morning of 2018      REVIEW OF SYSTEMS:   CARDIOVASCULAR: No chest pain, chest pressure or chest discomfort. No palpitations , positive edema.   RESPIRATORY: No shortness of breath, cough or sputum.   GASTROINTESTINAL: No anorexia,  "nausea, vomiting or diarrhea. No abdominal pain or blood.   HEMATOLOGIC: No bleeding or bruising.     Ventilator/Non-Invasive Ventilation Settings     None            Vital Signs  Temp:  [97.9 °F (36.6 °C)-99.5 °F (37.5 °C)] 97.9 °F (36.6 °C)  Heart Rate:  [68-81] 72  Resp:  [18] 18  BP: (118-131)/(61-74) 131/74  SpO2:  [95 %-98 %] 96 %  on  Flow (L/min):  [2] 2 Device (Oxygen Therapy): room air  No intake or output data in the 24 hours ending 04/12/18 1757  Flowsheet Rows    Flowsheet Row First Filed Value   Admission Height 167.6 cm (66\") Documented at 04/07/2018 1854   Admission Weight 102 kg (225 lb) Documented at 04/07/2018 1854        Body mass index is 37.08 kg/m².  1    04/07/18  1854 04/08/18  0100 04/09/18  0851   Weight: 102 kg (225 lb) 107 kg (236 lb 12.4 oz) 107 kg (236 lb 12.4 oz)       Physical Exam:  GEN:  No acute distress, alert, cooperative, well developed, On room air  EYES:   Sclera clear. No icterus. PERRL.  ENT:   External ears/nose normal, no oral lesions, no thrush, mucous membranes moist  NECK:  Supple, midline trachea  LUNGS: Normal chest on inspection, CTAB with diminished bases, no wheezes. No rhonchi. No crackles. Respirations regular, even and unlabored.   CV:  Regular rhythm and rate. Normal S1/S2. No murmurs, gallops, or rubs noted.  ABD:  Soft, non-tender and non-distended. Normal bowel sounds. No guarding  EXT:  Moves all extremities well. No cyanosis. No redness.  Positive left lower extremity asymmetric edema.   Skin: Cellulitis around the second and the third toe on the left with some swelling and edema of that right lower extremity, no bleeding, she does have an ulcer at the tip of the toe    Results Review:        Results from last 7 days  Lab Units 04/12/18  0457 04/11/18  0523 04/10/18  0520 04/09/18  0402 04/08/18  0059 04/07/18  1858   SODIUM mmol/L  --  134* 135* 139 139 130*   POTASSIUM mmol/L  --  3.7 3.3* 3.4* 3.6 5.8*   CHLORIDE mmol/L  --  100 101 102 105 91*   CO2 " mmol/L  --  25.6 22.6 20.0* 17.1* 24.2   BUN mg/dL  --  25* 27* 25* 22 30*   CREATININE mg/dL 0.64 0.74 0.78 0.93 0.92 1.30*   CALCIUM mg/dL  --  8.7 8.4* 8.5* 6.9* 9.7   AST (SGOT) U/L  --   --   --  44* 30 48*   ALT (SGPT) U/L  --   --   --  42* 39* 63*   ANION GAP mmol/L  --  8.4 11.4 17.0 16.9 14.8   ALBUMIN g/dL  --   --   --  3.00* 2.80* 4.30       Results from last 7 days  Lab Units 04/09/18  0402 04/08/18  1604 04/08/18  1051  04/07/18  1858   CK TOTAL U/L  --   --   --   --  62   TROPONIN T ng/mL <0.010 <0.010 <0.010  < > <0.010   < > = values in this interval not displayed.            Results from last 7 days  Lab Units 04/12/18  0457 04/11/18  0523 04/10/18  0520 04/09/18  1158 04/09/18  0402 04/08/18  0059 04/07/18  1858   WBC 10*3/mm3 7.11 6.10 6.23  --  6.46 9.46 8.38   HEMOGLOBIN g/dL 11.4* 10.9* 10.9*  --  11.5* 11.2* 14.5   HEMATOCRIT % 35.1* 33.6* 33.4*  --  34.6* 34.9* 42.6   PLATELETS 10*3/mm3 115* 91* 83* 75* 92* 86* 104*   MCV fL 98.6* 97.7 97.1  --  96.4 99.4* 96.2   NEUTROPHIL % %  --   --   --   --   --  88.5* 89.2*   LYMPHOCYTE % %  --   --   --   --   --  7.1* 6.9*   MONOCYTES % %  --   --   --   --   --  4.0* 3.5*   EOSINOPHIL % %  --   --   --   --   --  0.0* 0.1*   BASOPHIL % %  --   --   --   --   --  0.1 0.1   IMM GRAN % %  --   --   --   --   --  0.3 0.2           Results from last 7 days  Lab Units 04/07/18  1858   MAGNESIUM mg/dL 1.8           Invalid input(s): LDLCALC    Results from last 7 days  Lab Units 04/08/18  1606 04/08/18  0015   PH, ARTERIAL pH units 7.469* 7.453*   PCO2, ARTERIAL mm Hg 22.9* 26.6*   PO2 ART mm Hg 71.2* 75.1*   HCO3 ART mmol/L 16.6* 18.6*       Results from last 7 days  Lab Units 04/08/18  0059   HEMOGLOBIN A1C % 10.80*     Glucose   Date/Time Value Ref Range Status   04/12/2018 1146 233 (H) 70 - 130 mg/dL Final   04/12/2018 0805 148 (H) 70 - 130 mg/dL Final   04/11/2018 2015 163 (H) 70 - 130 mg/dL Final   04/11/2018 1619 210 (H) 70 - 130 mg/dL Final    04/11/2018 1117 229 (H) 70 - 130 mg/dL Final   04/11/2018 0501 153 (H) 70 - 130 mg/dL Final   04/10/2018 2223 221 (H) 70 - 130 mg/dL Final   04/10/2018 1613 214 (H) 70 - 130 mg/dL Final       Results from last 7 days  Lab Units 04/08/18  1604 04/08/18  0216 04/07/18  2214 04/07/18  1858   PROCALCITONIN ng/mL  --   --   --  1.08*   LACTATE mmol/L 1.5 2.4* 3.6*  --        Results from last 7 days  Lab Units 04/09/18  0930 04/09/18  0848 04/07/18  2250 04/07/18  2213   BLOODCX  No growth at 3 days No growth at 3 days   Streptococcus, Beta Hemolytic, Group C* No growth at 4 days   GRAM STAIN RESULT   --   --  Anaerobic Bottle Gram positive cocci in chains  Aerobic Bottle Gram positive cocci in chains  --    BCIDPCR   --   --  Streptococcus spp, not A, B, or pneumoniae. Identification by BCID PCR.*  --        Results from last 7 days  Lab Units 04/07/18  1942   NITRITE UA  Negative       Results from last 7 days  Lab Units 04/07/18  2217   ADENOVIRUS DETECTION BY PCR  Not Detected   CORONAVIRUS 229E  Not Detected   CORONAVIRUS HKU1  Not Detected   CORONAVIRUS NL63  Not Detected   CORONAVIRUS OC43  Not Detected   HUMAN METAPNEUMOVIRUS  Not Detected   HUMAN RHINOVIRUS/ENTEROVIRUS  Not Detected   INFLUENZA B PCR  Not Detected   PARAINFLUENZA 1  Not Detected   PARAINFLUENZA VIRUS 2  Not Detected   PARAINFLUENZA VIRUS 3  Not Detected   PARAINFLUENZA VIRUS 4  Not Detected   BORDETELLA PERTUSSIS PCR  Not Detected   CHLAMYDOPHILA PNEUMONIAE PCR  Not Detected   MYCOPLAMA PNEUMO PCR  Not Detected   INFLUENZA A PCR  Not Detected   INFLUENZA A H3  Not Detected   INFLUENZA A H1  Not Detected   RSV, PCR  Not Detected       Results from last 7 days  Lab Units 04/08/18  1604   URIC ACID mg/dL 3.1       Imaging:   Imaging Results (all)     Procedure Component Value Units Date/Time    MRI Foot Left With & Without Contrast [[id number]] Collected:  04/09/18 1011     Updated:  04/09/18 1011    Narrative:       LEFT MID AND FOREFOOT MRI  WITH AND WITHOUT CONTRAST     HISTORY: Sepsis with cellulitis and swelling at the 3rd toe.     TECHNIQUE: MRI of the left mid and forefoot was performed with and  without IV contrast. There is no previous exam for comparison.     FINDINGS: Marrow signal throughout the mid and forefoot is normal.  Specifically, there is no marrow signal abnormality of the 3rd toe.  There is soft tissue edema throughout the mid and forefoot with soft  tissue enhancement along the 3rd toe. No fluid collection is observed at  the 3rd toe. The interphalangeal joints and the metatarsophalangeal  joints demonstrate normal amount of fluid and no abnormal contrast  enhancement.     There is some degenerative change at the 1st and 2nd tarsometatarsal  joints with a small volume of fluid around the plantar edge of the 1st  tarsometatarsal joint. There is also a small volume of fluid in the  flexor hallucis longus tendon sheath at the same level. Some soft tissue  edema and enhancement is observed along the medial plantar surface of  the foot in the same region as the small fluid collections. There is no  marrow signal abnormality around the 1st metatarsophalangeal joint,  however. No other fluid collection is identified in the mid or forefoot.  The flexor and extensor tendons appear intact.       Impression:       Cellulitis at the 3rd toe without evidence of osteomyelitis  or septic arthritis.     There is some fluid in the flexor hallucis longus tendon sheath and  plantar to the 1st metatarsophalangeal joint, favored to be arising from  that joint, with some soft tissue enhancement along the plantar surface  of the foot in the same region. Given the clinical scenario, it is  possible that these changes are infectious. There is no evidence of  osteomyelitis around the visualized midfoot. The entire hindfoot is not  included in this exam.             I reviewed the patient's new clinical results.  I personally viewed and interpreted the  patient's imaging results:Chest x-ray showed no acute disease, MRI films findings per radiologist        Medication Review:     gabapentin 600 mg Oral Q8H   gadobenate dimeglumine 20 mL Intravenous Once in imaging   insulin aspart 2-5 Units Subcutaneous 4x Daily AC & at Bedtime   insulin NPH 15 Units Subcutaneous QAM   insulin NPH 15 Units Subcutaneous Nightly   insulin regular 8 Units Subcutaneous Daily With Breakfast & Dinner   levoFLOXacin 750 mg Oral Q24H   levothyroxine 137 mcg Oral Q AM         sodium chloride 9 mL/hr Last Rate: 9 mL/hr (04/09/18 0821)       ASSESSMENT:   1. Sepsis with streptococcal C infection from left foot cellulitis  2. Diabetic left foot ulcer  3. Uncontrolled diabetes mellitus  4. Syncope, negative cardiac/neurology workup  5. History of gout  6. Lactic acidosis on presentation  7. Thrombocytopenia  8. Hypokalemia  9. Hyponatremia    PLAN:  Patient was switched to oral antibiotic and the plan to discharge home tomorrow if she can afford the medication and if she is doing better on the oral regimen  No surgical intervention necessary at this point that she needs to have home health to follow which will be arranged as well initially if affordable  Patient has history of gout and has not been on any allopurinol so we will resume it today, she is not sure about the dosing so we will start with 200 mg.  Discussed with the patient and with the treating staff, we'll continue to follow  We will have CCP assess the cost of the Levaquin on discharge      Disposition: Consider discharge tomorrow  According to patient and family the insurance company stated that her  has insurance, but she does not and she was supposed to be set up according to her 's employer.  There undergoing current investigation, but at this point she does not have insurance and needs help to afford her medications.  CCP did evaluation and 70/30 NovoLog may be the best option for affordability.  Will request  evaluation for the Levaquin caused  Will need follow-up with PCP, podiatry and endocrinology     Jerry Russo MD  04/12/18  5:57 PM    Dictated utilizing Dragon dictation:  Much of this encounter note is an electronic transcription/translation of spoken language to printed text. The electronic translation of spoken language may permit erroneous, or at times, nonsensical words or phrases to be inadvertently transcribed; Although I have reviewed the note for such errors, some may still exist.

## 2018-04-12 NOTE — THERAPY TREATMENT NOTE
Acute Care - Physical Therapy Treatment Note  [facility]     Patient Name: Elisabet Berry  : 1955  MRN: [id number]  Today's Date: 2018     Date of Referral to PT: 18  Referring Physician: Karan    Admit Date: 2018    Visit Dx:    ICD-10-CM ICD-9-CM   1. Generalized weakness R53.1 780.79   2. Hyperglycemia R73.9 790.29   3. Dehydration E86.0 276.51   4. Altered mental status, unspecified altered mental status type R41.82 780.97   5. Sepsis, due to unspecified organism A41.9 038.9     995.91     Patient Active Problem List   Diagnosis   • Generalized weakness       Therapy Treatment          Rehabilitation Treatment Summary     Row Name 18 1000             Treatment Time/Intention    Discipline physical therapist  -MA      Document Type therapy note (daily note)  -MA      Subjective Information complains of;weakness;pain   feeling better than yesterday  -MA      Mode of Treatment physical therapy  -MA      Patient/Family Observations [facility] upon PT arrival, exit alarm on, no acute distress noted  -MA      Therapy Frequency (PT Clinical Impression) 5 times/wk  -MA      Patient Effort good  -MA      Existing Precautions/Restrictions fall   L LE-wbat  -MA      Recorded by [MA] Shyla Harris, PT 18 1044 18 1044      Row Name 18 1000             Vital Signs    O2 Delivery Pre Treatment room air  -MA      O2 Delivery Intra Treatment room air  -MA      O2 Delivery Post Treatment room air  -MA      Recorded by [MA] Shyla Harris, PT 18 1044 18 1044      Row Name 18 1000             Cognitive Assessment/Intervention- PT/OT    Orientation Status (Cognition) oriented x 4  -MA      Follows Commands (Cognition) WFL  -MA      Safety Deficit (Cognitive) at risk behavior observed  -MA      Personal Safety Interventions fall prevention program maintained;gait belt;nonskid shoes/slippers when out of bed  -MA      Recorded by [MA] Shyla Harris, PT 18  1044 04/12/18 1044      Row Name 04/12/18 1000             Safety Issues, Functional Mobility    Safety Issues Affecting Function (Mobility) awareness of need for assistance;insight into deficits/self awareness  -MA      Impairments Affecting Function (Mobility) pain;endurance/activity tolerance  -MA      Recorded by [MA] Shyla Harris, PT 04/12/18 1044 04/12/18 1044      Row Name 04/12/18 1000             Bed Mobility Assessment/Treatment    Bed Mobility Assessment/Treatment supine-sit;sit-supine  -MA      Comment (Bed Mobility) [facility] upon PT arrival  -MA      Recorded by [MA] Shyla Harris, PT 04/12/18 1044 04/12/18 1044      Row Name 04/12/18 1000             Transfer Assessment/Treatment    Sit-Stand [place] (Transfers) minimum assist (75% patient effort);moderate assist (50% patient effort);verbal cues  -MA      Stand-Sit [place] (Transfers) minimum assist (75% patient effort);moderate assist (50% patient effort);verbal cues  -MA      Recorded by [MA] Shyla Harris, PT 04/12/18 1044 04/12/18 1044      Row Name 04/12/18 1000             Sit-Stand Transfer    Assistive Device (Sit-Stand Transfers) walker, front-wheeled  -MA      Recorded by [MA] Shyla Harris, PT 04/12/18 1044 04/12/18 1044      Row Name 04/12/18 1000             Stand-Sit Transfer    Assistive Device (Stand-Sit Transfers) walker, front-wheeled  -MA      Recorded by [MA] Shyla Harris, PT 04/12/18 1044 04/12/18 1044      Row Name 04/12/18 1000             Gait/Stairs Assessment/Training    [place] Level (Gait) stand by assist;verbal cues  -MA      Assistive Device (Gait) walker, front-wheeled  -MA      Distance in Feet (Gait) 30  -MA      Pattern (Gait) step-to  -MA      Deviations/Abnormal Patterns (Gait) antalgic;irene decreased;gait speed decreased  -MA      Comment (Gait/Stairs) Improvement with weight shifting to L LE noted today- distance limited 2' to fatigue  -MA      Recorded by [MA] Shyla REID  Steven, PT 04/12/18 1044 04/12/18 1044      Row Name 04/12/18 1000             Static Sitting Balance    Level of [place] (Unsupported Sitting, Static Balance) supervision  -MA      Sitting Position (Unsupported Sitting, Static Balance) sitting on edge of bed  -MA      Level of [place] (Supported Sitting, Static Balance) supervision  -MA      Sitting Position (Supported Sitting, Static Balance) sitting on edge of bed  -MA      Recorded by [MA] Shyla Harris, PT 04/12/18 1044 04/12/18 1044      Row Name 04/12/18 1000             Static Standing Balance    Level of [place] (Supported Standing, Static Balance) standby assist  -MA      Assistive Device Utilized (Supported Standing, Static Balance) rolling walker  -MA      Recorded by [MA] Shyla Harris, PT 04/12/18 1044 04/12/18 1044      Row Name 04/12/18 1000             Positioning and Restraints    Pre-Treatment Position sitting in chair/recliner  -MA      Post Treatment Position chair  -MA      In Chair sitting;call light within reach;encouraged to call for assist;exit alarm on;legs elevated  -MA      Recorded by [MA] Shyla Harris, PT 04/12/18 1044 04/12/18 1044      Row Name 04/12/18 1000             Pain Scale: Numbers Pre/Post-Treatment    Pain Location - Side Left  -MA      Pain Location - Orientation lower  -MA      Pain Location toe  -MA      Pain Intervention(s) Repositioned;Ambulation/increased activity;Rest  -MA      Recorded by [MA] Shyla Harris, PT 04/12/18 1044 04/12/18 1044      Row Name 04/12/18 1000             Pain Scale: FACES Pre/Post-Treatment    Pain: FACES Scale, Pretreatment 2-->hurts little bit  -MA      Pain: FACES Scale, Post-Treatment 4-->hurts little more  -MA      Recorded by [MA] Shyla Harris, PT 04/12/18 1044 04/12/18 1044      Row Name 04/12/18 1000             Sensory Assessment/Intervention    Sensory General Assessment no sensation deficits identified  -MA      Recorded by [MA] Shyla REID  Steven, PT 04/12/18 1044 04/12/18 1044      Row Name 04/12/18 1000             Vision Assessment/Intervention    Visual Impairment/Limitations WFL  -MA      Recorded by [MA] Shyla Harris, PT 04/12/18 1044 04/12/18 1044      Row Name                Wound 04/08/18 0015 Left toe diabetic/neuropathic ulceration    Wound - Properties Group Date first assessed: 04/08/18 [KA] Time first assessed: 0015 [KA] Present On Admission : yes [KA] Side: Left [KA] Location: toe [KA], 3rd  Type: diabetic/neuropathic ulceration [KA] Recorded by:  [KA] Abigail Quan RN 04/08/18 0206 04/08/18 0206    Row Name                Wound 04/08/18 0015 occipital region laceration    Wound - Properties Group Date first assessed: 04/08/18 [KA] Time first assessed: 0015 [KA] Present On Admission : yes [KA] Location: occipital region [KA] Type: laceration [KA] Number of Staples Placed: 1 [KA] Recorded by:  [KA] Abigail Quan RN 04/08/18 0209 04/08/18 0209    Row Name 04/12/18 1000             Plan of Care Review    Plan of Care Reviewed With patient  -MA      Recorded by [MA] Shyla Harris, PT 04/12/18 1044 04/12/18 1044      Row Name 04/12/18 1000             Outcome Summary/Treatment Plan (PT)    Daily Summary of Progress (PT) progress toward functional goals is good  -MA      Recorded by [MA] Shyla Harris, PT 04/12/18 1044 04/12/18 1044        User Key  (r) = Recorded By, (t) = Taken By, (c) = Cosigned By    Initials Name Effective Dates Discipline    KA Abigail Quan RN 01/26/18 -  Nurse    VLADISLAV Harris, PT 04/03/18 -  PT          Wound 04/08/18 0015 Left toe diabetic/neuropathic ulceration (Active)   Dressing Appearance dry;no drainage 4/12/2018  9:30 AM   Base black eschar;scab 4/12/2018  9:30 AM   Drainage Amount none 4/12/2018  9:30 AM   Care, Wound cleansed with;soap and water 4/12/2018  9:30 AM   Dressing Care, Wound open to air 4/12/2018  9:30 AM       Wound 04/08/18 0015 occipital region laceration  (Active)   Dressing Appearance open to air 4/12/2018  9:30 AM   Closure Staples 4/12/2018  9:30 AM   Base closed/resurfaced 4/12/2018  9:30 AM             Physical Therapy Education     Title: PT OT SLP Therapies (Done)     Topic: Physical Therapy (Done)     Point: Mobility training (Done)    Learning Progress Summary     Learner Status Readiness Method Response Comment Documented by    Patient Done Acceptance E VU  MA 04/12/18 1045     Active Acceptance E NR  MA 04/11/18 1610          Point: Body mechanics (Done)    Learning Progress Summary     Learner Status Readiness Method Response Comment Documented by    Patient Done Acceptance E VU  MA 04/12/18 1045     Active Acceptance E NR  MA 04/11/18 1610          Point: Precautions (Done)    Learning Progress Summary     Learner Status Readiness Method Response Comment Documented by    Patient Done Acceptance E VU  MA 04/12/18 1045     Active Acceptance E NR  MA 04/11/18 1610                      User Key     Initials Effective Dates Name Provider Type Discipline    MA 04/03/18 -  Shyla Harris, PT Physical Therapist PT                    PT Recommendation and Plan  Anticipated Discharge Disposition (PT): home or self care, home with home health care  Planned Therapy Interventions (PT Eval): balance training, bed mobility training, gait training, home exercise program, postural re-education, prosthetic fitting/training, stair training, strengthening, transfer training  Therapy Frequency (PT Clinical Impression): 5 times/wk  Outcome Summary/Treatment Plan (PT)  Daily Summary of Progress (PT): progress toward functional goals is good  Anticipated Equipment Needs at Discharge (PT): front wheeled walker (pending insurance options and PT progress made)  Anticipated Discharge Disposition (PT): home or self care, home with home health care  Plan of Care Reviewed With: patient  Progress: improving  Outcome Summary: Improved tolerance to activity through increased  ambulation distance of 30' with RW. Increased weight-shifting to L LE noted with ambulation. Will continue to progress as able.          Outcome Measures     Row Name 04/12/18 1000 04/11/18 1600          How much help from another person do you currently need...    Turning from your back to your side while in flat bed without using bedrails? 4  -MA 4  -MA     Moving from lying on back to sitting on the side of a flat bed without bedrails? 4  -MA 4  -MA     Moving to and from a bed to a chair (including a wheelchair)? 2  -MA 3  -MA     Standing up from a chair using your arms (e.g., wheelchair, bedside chair)? 2  -MA 3  -MA     Climbing 3-5 steps with a railing? 2  -MA 2  -MA     To walk in hospital room? 3  -MA 3  -MA     AM-PAC 6 Clicks Score 17  -MA 19  -MA        Functional Assessment    Outcome Measure Options AM-PAC 6 Clicks Basic Mobility (PT)  -MA AM-PAC 6 Clicks Basic Mobility (PT)  -MA       User Key  (r) = Recorded By, (t) = Taken By, (c) = Cosigned By    Initials Name Provider Type    VLADISLAV Harris PT Physical Therapist           Time Calculation:         PT Charges     Row Name 04/12/18 1039             Time Calculation    Start Time 1020  -MA      Stop Time 1039  -MA      Time Calculation (min) 19 min  -MA      PT Received On 04/12/18  -MA      PT - Next Appointment 04/13/18  -MA        User Key  (r) = Recorded By, (t) = Taken By, (c) = Cosigned By    Initials Name Provider Type    VLADISLAV Harris PT Physical Therapist          Therapy Charges for Today     Code Description Service Date Service Provider Modifiers Qty    66698741500  PT EVAL MOD COMPLEXITY 2 4/11/2018 Shyla Harris PT GP 1    83852588640 HC PT THER PROC EA 15 MIN 4/11/2018 Shyla Harris PT GP 1    92314900404 HC PT THER PROC EA 15 MIN 4/12/2018 Shyla Harris PT GP 1          PT G-Codes  Outcome Measure Options: AM-PAC 6 Clicks Basic Mobility (PT)    Shyla Harris PT  4/12/2018

## 2018-04-12 NOTE — PROGRESS NOTES
LOS: 5 days     Chief Complaint:  Follow-up Group C Strep septicemia    Interval History:  No acute events. No fevers. Still w/ foot edema. She is tolerating antibiotics w/o rash or diarrhea. She is eager for discharge. She is going to stay w/ her daughter in [place] for the next week.    ROS: no n/v/d    Vital Signs  Temp:  [97.8 °F (36.6 °C)-99.5 °F (37.5 °C)] 98.3 °F (36.8 °C)  Heart Rate:  [68-81] 68  Resp:  [18] 18  BP: (107-123)/(54-72) 121/67    Physical Exam:  General: awake, alert  Head: Normocephalic  Eyes:  no conjunctival pallor, no conjunctival hemorrhages.   ENT: MMM, OP clear, no thrush. Fair dentition.   Neck: Supple  Cardiovascular: NR, 1+LLE edema  Respiratory: normal work of breathing on ambient air  GI: Abdomen is soft, non-tender, non-distended  : no Biggs catheter present  Musculoskeletal: Left foot less erythematous but still edematous; 3rd toe specifically with shallow ulcer on dorsum; no other joint abnormalities, normal musculature  Skin: No embolic phenomenon  Neurological: Alert and oriented x 3,motor strength 5/5 in all four extremities  Psychiatric: Normal mood and affect   Vasc: no cyanosis; PIV w/o erythema    Antibiotics:  •  penicillin G potassium 4 Million Units in sodium chloride 0.9 % 100 mL IVPB, 4 Million Units, Intravenous, Q4H, Kalyan Deleon MD    LABS:  CBC, CRP, Crt, micro reviewed today  Lab Results   Component Value Date    WBC 7.11 04/12/2018    HGB 11.4 (L) 04/12/2018    HCT 35.1 (L) 04/12/2018    MCV 98.6 (H) 04/12/2018     (L) 04/12/2018     Lab Results   Component Value Date    CREATININE 0.64 04/12/2018     Lab Results   Component Value Date    CRP 12.21 (H) 04/12/2018     Lab Results   Component Value Date    HGBA1C 10.80 (H) 04/08/2018       Microbiology:  4/7 BCx: Strep group C species in 1/2 sets (sensitive to PCN, CTX, and LVQ)  4/7 RVP: negative  4/9 BCx: NGTD    Radiology (personally reviewed report):   No new imaging today    EKG  reviewed for QTc and it is less than 400 ms    Assessment/Plan   1. Sepsis due to Strep group C septicemia secondary to left foot cellulitis and tenosynovitis secondary to diabetic foot ulcer  -MRI positive for tendon inflammation but no abscess or osteomyelitis  -stop PCN G  -start levofloxacin 750 mg PO q24h x 3 weeks with stop date 4/29/18; this will provide strep coverage and also provide good joint/tendon penetration   -will ask wound care team to see her  -asked her to call her PCP to set her up with wound care closer to home in [place], [place] as an outpatient given diabetes and foot ulcer  -repeat BCx NGTD  -does not need a JAYE from my standpoint     2. Uncontrolled DM2  -poor compliance as an outpatient she says  -encouraged stricter control to promote healing and decrease risk of infection    Thank you for allowing me to be involved in the care of this patient. Infectious diseases will sign off at this time with antibiotics plan in place but please call me at [phone] if any further questions.

## 2018-04-13 ENCOUNTER — APPOINTMENT (OUTPATIENT)
Dept: MRI IMAGING | Facility: HOSPITAL | Age: 63
End: 2018-04-13

## 2018-04-13 LAB
GLUCOSE BLDC GLUCOMTR-MCNC: 133 MG/DL (ref 70–130)
GLUCOSE BLDC GLUCOMTR-MCNC: 139 MG/DL (ref 70–130)
GLUCOSE BLDC GLUCOMTR-MCNC: 164 MG/DL (ref 70–130)
GLUCOSE BLDC GLUCOMTR-MCNC: 217 MG/DL (ref 70–130)

## 2018-04-13 PROCEDURE — 82962 GLUCOSE BLOOD TEST: CPT

## 2018-04-13 PROCEDURE — 72141 MRI NECK SPINE W/O DYE: CPT

## 2018-04-13 PROCEDURE — 63710000001 INSULIN ASPART PER 5 UNITS: Performed by: INTERNAL MEDICINE

## 2018-04-13 PROCEDURE — 97110 THERAPEUTIC EXERCISES: CPT

## 2018-04-13 PROCEDURE — 99233 SBSQ HOSP IP/OBS HIGH 50: CPT | Performed by: INTERNAL MEDICINE

## 2018-04-13 PROCEDURE — 99232 SBSQ HOSP IP/OBS MODERATE 35: CPT | Performed by: PSYCHIATRY & NEUROLOGY

## 2018-04-13 PROCEDURE — 99231 SBSQ HOSP IP/OBS SF/LOW 25: CPT | Performed by: NURSE PRACTITIONER

## 2018-04-13 PROCEDURE — 25010000002 LORAZEPAM PER 2 MG: Performed by: PSYCHIATRY & NEUROLOGY

## 2018-04-13 PROCEDURE — 63710000001 INSULIN ISOPHANE HUMAN PER 5 UNITS: Performed by: INTERNAL MEDICINE

## 2018-04-13 PROCEDURE — 25010000002 MORPHINE PER 10 MG: Performed by: INTERNAL MEDICINE

## 2018-04-13 RX ORDER — LORAZEPAM 2 MG/ML
1 INJECTION INTRAMUSCULAR
Status: COMPLETED | OUTPATIENT
Start: 2018-04-13 | End: 2018-04-13

## 2018-04-13 RX ORDER — ALLOPURINOL 300 MG/1
300 TABLET ORAL DAILY
Status: DISCONTINUED | OUTPATIENT
Start: 2018-04-14 | End: 2018-04-17 | Stop reason: HOSPADM

## 2018-04-13 RX ADMIN — LORAZEPAM 1 MG: 2 INJECTION INTRAMUSCULAR; INTRAVENOUS at 18:24

## 2018-04-13 RX ADMIN — HUMAN INSULIN 15 UNITS: 100 INJECTION, SUSPENSION SUBCUTANEOUS at 21:22

## 2018-04-13 RX ADMIN — ACETAMINOPHEN 650 MG: 325 TABLET ORAL at 08:35

## 2018-04-13 RX ADMIN — LEVOFLOXACIN 750 MG: 750 TABLET, FILM COATED ORAL at 13:42

## 2018-04-13 RX ADMIN — GABAPENTIN 600 MG: 300 CAPSULE ORAL at 13:42

## 2018-04-13 RX ADMIN — HUMAN INSULIN 15 UNITS: 100 INJECTION, SUSPENSION SUBCUTANEOUS at 09:57

## 2018-04-13 RX ADMIN — HUMAN INSULIN 8 UNITS: 100 INJECTION, SOLUTION SUBCUTANEOUS at 17:42

## 2018-04-13 RX ADMIN — GABAPENTIN 600 MG: 300 CAPSULE ORAL at 07:04

## 2018-04-13 RX ADMIN — GABAPENTIN 600 MG: 300 CAPSULE ORAL at 21:22

## 2018-04-13 RX ADMIN — LEVOTHYROXINE SODIUM 137 MCG: 137 TABLET ORAL at 07:04

## 2018-04-13 RX ADMIN — MORPHINE SULFATE 2 MG: 2 INJECTION, SOLUTION INTRAMUSCULAR; INTRAVENOUS at 21:22

## 2018-04-13 RX ADMIN — ALLOPURINOL 200 MG: 100 TABLET ORAL at 08:35

## 2018-04-13 RX ADMIN — HUMAN INSULIN 8 UNITS: 100 INJECTION, SOLUTION SUBCUTANEOUS at 09:57

## 2018-04-13 RX ADMIN — INSULIN ASPART 3 UNITS: 100 INJECTION, SOLUTION INTRAVENOUS; SUBCUTANEOUS at 17:41

## 2018-04-13 NOTE — THERAPY TREATMENT NOTE
Acute Care - Physical Therapy Treatment Note  [facility]     Patient Name: Elisabet Berry  : 1955  MRN: [id number]  Today's Date: 2018     Date of Referral to PT: 18  Referring Physician: Karan    Admit Date: 2018    Visit Dx:    ICD-10-CM ICD-9-CM   1. Generalized weakness R53.1 780.79   2. Hyperglycemia R73.9 790.29   3. Dehydration E86.0 276.51   4. Altered mental status, unspecified altered mental status type R41.82 780.97   5. Sepsis, due to unspecified organism A41.9 038.9     995.91     Patient Active Problem List   Diagnosis   • Generalized weakness       Therapy Treatment          Rehabilitation Treatment Summary     Row Name 18 1400 18 1300          Treatment Time/Intention    Discipline physical therapist  -MA physical therapist  -MA     Document Type  -- therapy note (daily note)  -MA     Subjective Information  -- complains of;weakness;fatigue  -MA2     Mode of Treatment  -- physical therapy  -MA2     Patient/Family Observations  -- UIC upon PT arrival, no exit alarm, no acute distress noted at rest  -MA2     Care Plan Review  -- patient/other agree to care plan  -MA2     Therapy Frequency (PT Clinical Impression)  -- 5 times/wk  -MA2     Patient Effort  -- good  -MA2     Existing Precautions/Restrictions  -- fall   L LE-wbat  -MA2     Recorded by [MA] Shyla Harris, PT 18 1455 18 1455 [MA] Shyla Harris, PT 18 1455 18 1455  [MA2] Shyla Harris, PT 18 1328 18 1328     Row Name 18 1300             Vital Signs    O2 Delivery Pre Treatment room air  -MA      O2 Delivery Intra Treatment room air  -MA      O2 Delivery Post Treatment room air  -MA      Recorded by [MA] Shyla Harris, PT 18 1328 18 1328      Row Name 18 1300             Cognitive Assessment/Intervention- PT/OT    Orientation Status (Cognition) oriented x 4  -MA      Follows Commands (Cognition) WFL  -MA      Safety Deficit  (Cognitive) at risk behavior observed  -MA      Personal Safety Interventions fall prevention program maintained;gait belt;nonskid shoes/slippers when out of bed  -MA      Recorded by [MA] Shyla Harris, PT 04/13/18 1328 04/13/18 1328      Row Name 04/13/18 1300             Safety Issues, Functional Mobility    Safety Issues Affecting Function (Mobility) at risk behavior observed  -MA      Impairments Affecting Function (Mobility) endurance/activity tolerance;pain;strength  -MA      Recorded by [MA] Shyla Harris, PT 04/13/18 1455 04/13/18 1455      Row Name 04/13/18 1300             Mobility Assessment/Intervention    Extremity Weight-bearing Status left lower extremity  -MA      Left Lower Extremity (Weight-bearing Status) weight-bearing as tolerated (WBAT)  -MA      Recorded by [MA] Shyla Harris, PT 04/13/18 1455 04/13/18 1455      Row Name 04/13/18 1300             Bed Mobility Assessment/Treatment    Bed Mobility Assessment/Treatment supine-sit;sit-supine  -MA      Supine-Sit [place] (Bed Mobility) not tested  -MA      Sit-Supine [place] (Bed Mobility) minimum assist (75% patient effort);verbal cues  -MA      Bed Mobility, Safety Issues decreased use of legs for bridging/pushing;impaired trunk control for bed mobility  -MA      Assistive Device (Bed Mobility) bed rails;head of bed elevated  -MA      Comment (Bed Mobility) UIC upon PT arrival  -MA      Recorded by [MA] Shyla Harris, PT 04/13/18 1328 04/13/18 1328      Row Name 04/13/18 1300             Transfer Assessment/Treatment    Transfer Assessment/Treatment sit-stand transfer;stand-sit transfer;toilet transfer  -MA      Sit-Stand [place] (Transfers) minimum assist (75% patient effort);moderate assist (50% patient effort);verbal cues  -MA      Stand-Sit [place] (Transfers) minimum assist (75% patient effort);moderate assist (50% patient effort);verbal cues  -MA      [place] Level (Toilet Transfer) moderate  assist (50% patient effort);verbal cues  -MA      Assistive Device (Toilet Transfer) walker, front-wheeled  -MA      Recorded by [MA] Shyla Harris, PT 04/13/18 1328 04/13/18 1328      Row Name 04/13/18 1300             Sit-Stand Transfer    Assistive Device (Sit-Stand Transfers) walker, front-wheeled  -MA      Recorded by [MA] Shyla Harris, PT 04/13/18 1328 04/13/18 1328      Row Name 04/13/18 1300             Stand-Sit Transfer    Assistive Device (Stand-Sit Transfers) walker, front-wheeled  -MA      Recorded by [MA] Shyla Harris, PT 04/13/18 1328 04/13/18 1328      Row Name 04/13/18 1300             Toilet Transfer    Type (Toilet Transfer) sit-stand;stand-sit  -MA      Recorded by [MA] Shyla Harris, PT 04/13/18 1328 04/13/18 1328      Row Name 04/13/18 1300             Gait/Stairs Assessment/Training    [place] Level (Gait) stand by assist;verbal cues  -MA      Assistive Device (Gait) walker, front-wheeled  -MA      Distance in Feet (Gait) 5  -MA2      Pattern (Gait) step-to  -MA      Deviations/Abnormal Patterns (Gait) antalgic;irene decreased;gait speed decreased  -MA      Comment (Gait/Stairs) Distance limited 2' to pain. Ambulated a few steps forward- Dr. Watson entered room and requested us to have her sit back in chair. Neuro consult completed and patient then assisted back from chair to bed.  -MA2      Recorded by [MA] Shyla Harris, PT 04/13/18 1328 04/13/18 1328  [MA2] Shyla Harris, PT 04/13/18 1455 04/13/18 1455      Row Name 04/13/18 1300             Static Sitting Balance    Level of [place] (Unsupported Sitting, Static Balance) supervision  -MA      Sitting Position (Unsupported Sitting, Static Balance) sitting in chair  -MA      Level of [place] (Supported Sitting, Static Balance) supervision  -MA      Sitting Position (Supported Sitting, Static Balance) sitting in chair  -MA      Recorded by [MA] Shyla Harris, PT 04/13/18 1328 04/13/18 1328       Row Name 04/13/18 1300             Static Standing Balance    Level of [place] (Supported Standing, Static Balance) standby assist  -MA      Assistive Device Utilized (Supported Standing, Static Balance) rolling walker  -MA      Recorded by [MA] Shyla Harris, PT 04/13/18 1328 04/13/18 1328      Row Name 04/13/18 1300             Positioning and Restraints    Pre-Treatment Position sitting in chair/recliner  -MA      Post Treatment Position bed  -MA      In Bed fowlers;call light within reach;encouraged to call for assist;with family/caregiver;LLE elevated  -MA2      Recorded by [MA] Shyla Harris, PT 04/13/18 1328 04/13/18 1328  [MA2] Shyla Harris, PT 04/13/18 1455 04/13/18 1455      Row Name 04/13/18 1300             Pain Scale: Numbers Pre/Post-Treatment    Pain Location - Side Left  -MA      Pain Location - Orientation lower  -MA      Pain Location toe  -MA      Pain Intervention(s) Repositioned;Acupuncture;Rest  -MA2      Recorded by [MA] Shyla Harris, PT 04/13/18 1328 04/13/18 1328  [MA2] Shyla Harris, PT 04/13/18 1455 04/13/18 1455      Row Name 04/13/18 1300             Pain Scale: FACES Pre/Post-Treatment    Pain: FACES Scale, Pretreatment 4-->hurts little more  -MA      Pain: FACES Scale, Post-Treatment 6-->hurts even more  -MA      Recorded by [MA] Shyla Harris, PT 04/13/18 1455 04/13/18 1455      Row Name 04/13/18 1300             Sensory Assessment/Intervention    Sensory General Assessment --   Numbness of L toe  -MA      Recorded by [MA] Shyla Harris, PT 04/13/18 1455 04/13/18 1455      Row Name 04/13/18 1300             Vision Assessment/Intervention    Visual Impairment/Limitations WFL  -MA      Recorded by [MA] Shyla Harris, PT 04/13/18 1328 04/13/18 1328      Row Name                Wound 04/08/18 0015 Left toe diabetic/neuropathic ulceration    Wound - Properties Group Date first assessed: 04/08/18 [KA] Time first assessed: 0015 [KA] Present  On Admission : yes [KA] Side: Left [KA] Location: toe [KA], 3rd  Type: diabetic/neuropathic ulceration [KA] Recorded by:  [KA] Abigail Quan RN 04/08/18 0206 04/08/18 0206    Row Name                Wound 04/08/18 0015 occipital region laceration    Wound - Properties Group Date first assessed: 04/08/18 [KA] Time first assessed: 0015 [KA] Present On Admission : yes [KA] Location: occipital region [KA] Type: laceration [KA] Number of Staples Placed: 1 [KA] Recorded by:  [KA] Abigail Quan RN 04/08/18 0209 04/08/18 0209    Row Name 04/13/18 1300             Plan of Care Review    Plan of Care Reviewed With patient  -MA      Recorded by [MA] Shyla Harris, PT 04/13/18 1328 04/13/18 1328      Row Name 04/13/18 1300             Outcome Summary/Treatment Plan (PT)    Daily Summary of Progress (PT) progress towards functional goals is fair  -MA      Recorded by [MA] Shyla Harris, PT 04/13/18 1455 04/13/18 1455        User Key  (r) = Recorded By, (t) = Taken By, (c) = Cosigned By    Initials Name Effective Dates Discipline    JEANNINE Quan RN 01/26/18 -  Nurse    VLADISLAV Harris, PT 04/03/18 -  PT          Wound 04/08/18 0015 Left toe diabetic/neuropathic ulceration (Active)   Dressing Appearance open to air 4/13/2018  8:35 AM   Closure None 4/13/2018  8:35 AM   Base black eschar;scab 4/13/2018  8:35 AM   Periwound redness 4/13/2018  8:35 AM   Periwound Temperature warm 4/13/2018  8:35 AM   Periwound Skin Turgor firm 4/13/2018  8:35 AM   Edges irregular 4/13/2018  8:35 AM   Drainage Amount none 4/13/2018  8:35 AM   Dressing Care, Wound open to air 4/13/2018  8:35 AM       Wound 04/08/18 0015 occipital region laceration (Active)   Dressing Appearance open to air 4/13/2018  8:35 AM   Closure Staples 4/13/2018  8:35 AM   Base closed/resurfaced 4/13/2018  8:35 AM   Drainage Amount none 4/13/2018  8:35 AM   Dressing Care, Wound open to air 4/13/2018  8:35 AM             Physical Therapy Education      Title: PT OT SLP Therapies (Done)     Topic: Physical Therapy (Done)     Point: Mobility training (Done)    Learning Progress Summary     Learner Status Readiness Method Response Comment Documented by    Patient Done Acceptance E VU  MA 04/13/18 1455     Done Acceptance E VU  MA 04/12/18 1045     Active Acceptance E NR  MA 04/11/18 1610          Point: Body mechanics (Done)    Learning Progress Summary     Learner Status Readiness Method Response Comment Documented by    Patient Done Acceptance E VU  MA 04/13/18 1455     Done Acceptance E VU  MA 04/12/18 1045     Active Acceptance E NR  MA 04/11/18 1610          Point: Precautions (Done)    Learning Progress Summary     Learner Status Readiness Method Response Comment Documented by    Patient Done Acceptance E VU  MA 04/13/18 1455     Done Acceptance E VU  MA 04/12/18 1045     Active Acceptance E NR  MA 04/11/18 1610                      User Key     Initials Effective Dates Name Provider Type Discipline    MA 04/03/18 -  Shyla Harris, PT Physical Therapist PT                    PT Recommendation and Plan  Anticipated Discharge Disposition (PT): home or self care, home with home health care  Planned Therapy Interventions (PT Eval): balance training, bed mobility training, gait training, home exercise program, postural re-education, prosthetic fitting/training, stair training, strengthening, transfer training  Therapy Frequency (PT Clinical Impression): 5 times/wk  Outcome Summary/Treatment Plan (PT)  Daily Summary of Progress (PT): progress towards functional goals is fair  Anticipated Equipment Needs at Discharge (PT): front wheeled walker (pending insurance options and PT progress made)  Anticipated Discharge Disposition (PT): home or self care, home with home health care  Plan of Care Reviewed With: patient  Progress: improving  Outcome Summary: Unable to progress ambulation distance today 2' to pain. Improved carryover with safety awareness. Will continue  to progress as able.          Outcome Measures     Row Name 04/13/18 1400 04/12/18 1000 04/11/18 1600       How much help from another person do you currently need...    Turning from your back to your side while in flat bed without using bedrails? 4  -MA 4  -MA 4  -MA    Moving from lying on back to sitting on the side of a flat bed without bedrails? 4  -MA 4  -MA 4  -MA    Moving to and from a bed to a chair (including a wheelchair)? 2  -MA 2  -MA 3  -MA    Standing up from a chair using your arms (e.g., wheelchair, bedside chair)? 2  -MA 2  -MA 3  -MA    Climbing 3-5 steps with a railing? 2  -MA 2  -MA 2  -MA    To walk in hospital room? 3  -MA 3  -MA 3  -MA    AM-PAC 6 Clicks Score 17  -MA 17  -MA 19  -MA       Functional Assessment    Outcome Measure Options AM-PAC 6 Clicks Basic Mobility (PT)  -MA AM-PAC 6 Clicks Basic Mobility (PT)  -MA AM-PAC 6 Clicks Basic Mobility (PT)  -MA      User Key  (r) = Recorded By, (t) = Taken By, (c) = Cosigned By    Initials Name Provider Type    VLADISLAV Harris PT Physical Therapist           Time Calculation:         PT Charges     Row Name 04/13/18 1323 04/13/18 1315          Time Calculation    Start Time 1430  -MA 1300  -MA     Stop Time 1452  -MA  --     Time Calculation (min) 22 min  -MA  --     PT Received On 04/13/18  -MA  --     PT - Next Appointment 04/14/18  -MA  --       User Key  (r) = Recorded By, (t) = Taken By, (c) = Cosigned By    Initials Name Provider Type    VLADISLAV Harris PT Physical Therapist          Therapy Charges for Today     Code Description Service Date Service Provider Modifiers Qty    23151636548 HC PT THER PROC EA 15 MIN 4/12/2018 Shyla Harris, PT GP 1    30239686466 HC PT THER PROC EA 15 MIN 4/13/2018 Shyla Harris PT GP 1          PT G-Codes  Outcome Measure Options: AM-PAC 6 Clicks Basic Mobility (PT)    Shyla Harris PT  4/13/2018

## 2018-04-13 NOTE — PLAN OF CARE
Problem: Patient Care Overview  Goal: Plan of Care Review  Outcome: Ongoing (interventions implemented as appropriate)   04/13/18 0556   Coping/Psychosocial   Plan of Care Reviewed With patient   Plan of Care Review   Progress improving   OTHER   Outcome Summary Pt A/O x4, VSS. C/o pain to lt toe, foot, and ankle. Lt leg elevated and pain med help to relieve some pain. Pt denies having much of an appetite. Will continue to monitor       Problem: Skin Injury Risk (Adult)  Goal: Skin Health and Integrity  Outcome: Ongoing (interventions implemented as appropriate)      Problem: Fall Risk (Adult)  Goal: Absence of Fall  Outcome: Ongoing (interventions implemented as appropriate)      Problem: Sepsis/Septic Shock (Adult)  Goal: Signs and Symptoms of Listed Potential Problems Will be Absent, Minimized or Managed (Sepsis/Septic Shock)  Outcome: Ongoing (interventions implemented as appropriate)      Problem: Pain, Acute (Adult)  Goal: Acceptable Pain Control/Comfort Level  Outcome: Ongoing (interventions implemented as appropriate)

## 2018-04-13 NOTE — PROGRESS NOTES
62 y.o.   LOS: 6 days   Patient Care Team:  Christine Phelps DO as PCP - General (Family Medicine)    Chief Complaint:  Uncontrolled type 2 diabetes mellitus    Chief Complaint   Patient presents with   • Fall     multiple falls today. last fall, hit head. small abrasion with hematoma to back of head. denies loc. denies blood thinner use. last normal was 1330 today.        Subjective     HPI  Patient's blood sugars are in the 130-220 range  She is tolerating the NPH plus regular insulin so far  She is comfortable with taking the insulin injections herself  She also reported that her leg is feeling better        Interval History      Patient is a 62-year-old white female with a past history of that medicine hypertension admitted to the hospital with altered mental status multiple falls and injury to the head and is currently being treated for cellulitis left leg and is currently on antibiotics  Patient's blood sugars were noted to be in the 200-400 range  Her hemoglobin A1c was 10.8 and was consulted for further managing patient's diabetes     Patient's mental status apparently improved as per the daughter at the bedside  Patient is able to answer questions  She reported that she's been diabetic for more than 20 years.  Until approximately 5 years ago she was working and was able to get care  She remembers being on toujeo 60 units daily along with victoza 1.8 mg daily.  She reports that the blood sugars used to be under control  She lost her job approximately 3 years ago and was managing with samples from the doctor's office.  She could not get consistent help with samples and goes through periods of no medication at all  Patient has not been checking her Accu-Cheks but reports that her blood sugars could range from 100-300 range sometimes higher     Patient reports symptoms of diabetic peripheral neuropathy and is currently on gabapentin  Patient denied any knowledge of diabetic nephropathy  She denied any knowledge  of diabetic retinopathy even though her last eye examination was about 2-3 years ago.     At the time of admission patient was noted to be in acute renal failure CT scan of the head was negative for any acute pathology next line patient is currently under nephrology care as well as intensive care specialists     Patient reported significant cost issues with the Levemir and victoza and was not able to use it without samples.   apparently works in construction and was supposed to have insurance but they're still trying to verify that coverage  Review of Systems:      Review of Systems   Eyes: Negative.    Respiratory: Negative.    Cardiovascular: Negative.    Gastrointestinal: Positive for abdominal distention.   Psychiatric/Behavioral: Negative.    All other systems reviewed and are negative.    Objective     Vital Signs   Temp:  [98.3 °F (36.8 °C)-99.5 °F (37.5 °C)] 98.3 °F (36.8 °C)  Heart Rate:  [71-98] 98  Resp:  [18] 18  BP: (115-154)/(54-84) 115/54    Physical Exam:  Physical Exam   Eyes: EOM are normal. Pupils are equal, round, and reactive to light.   Neck: Normal range of motion. Neck supple.   Cardiovascular: Normal rate, regular rhythm, normal heart sounds and intact distal pulses.    Pulmonary/Chest: Effort normal and breath sounds normal.   Abdominal: Soft. Bowel sounds are normal.   Musculoskeletal: Normal range of motion. She exhibits no edema.   Neurological: She is alert.   Skin: Skin is warm and dry.   Psychiatric: She has a normal mood and affect. Her behavior is normal.   Nursing note and vitals reviewed.  Results Review:     I reviewed the patient's new clinical results.      Glucose   Date/Time Value Ref Range Status   04/11/2018 0523 172 (H) 65 - 99 mg/dL Final     Lab Results (last 72 hours)     Procedure Component Value Units Date/Time    POC Glucose Once [[id number]]  (Abnormal) Collected:  04/13/18 1645    Specimen:  Blood Updated:  04/13/18 1648     Glucose 217 (H) mg/dL      Narrative:       Meter: [id number] : [id number] Miya Iglesias CNA    POC Glucose Once [[id number]]  (Abnormal) Collected:  04/13/18 1144    Specimen:  Blood Updated:  04/13/18 1146     Glucose 139 (H) mg/dL     Narrative:       Meter: [id number] : [id number] Miya PADILLAA    Blood Culture - Blood, [[id number]]  (Normal) Collected:  04/09/18 0930    Specimen:  Blood from Arm, Left Updated:  04/13/18 0946     Blood Culture No growth at 4 days    Blood Culture - Blood, [[id number]]  (Normal) Collected:  04/09/18 0848    Specimen:  Blood from Hand, Left Updated:  04/13/18 0931     Blood Culture No growth at 4 days    POC Glucose Once [[id number]]  (Abnormal) Collected:  04/13/18 0712    Specimen:  Blood Updated:  04/13/18 0713     Glucose 164 (H) mg/dL     Narrative:       Meter: [id number] : [id number] Miya PADILLAA    Blood Culture - Blood, [[id number]]  (Normal) Collected:  04/07/18 2213    Specimen:  Blood from Hand, Left Updated:  04/12/18 2231     Blood Culture No growth at 5 days    POC Glucose Once [[id number]]  (Abnormal) Collected:  04/12/18 2102    Specimen:  Blood Updated:  04/12/18 2152     Glucose 151 (H) mg/dL     Narrative:       Meter: [id number] : [id number] Brandon Guevara NA    POC Glucose Once [[id number]]  (Abnormal) Collected:  04/12/18 1736    Specimen:  Blood Updated:  04/12/18 2102     Glucose 149 (H) mg/dL     Narrative:       Meter: [id number] : [id number] Live Dickson RN    POC Glucose Once [[id number]]  (Abnormal) Collected:  04/12/18 1715    Specimen:  Blood Updated:  04/12/18 2058     Glucose 180 (H) mg/dL     Narrative:       Meter: [id number] : [id number] Chip PADILLAA    Ehrlichia Antibody Panel [[id number]] Collected:  04/08/18 1051    Specimen:  Blood Updated:  04/12/18 1711     E. chaffeensis (HME) IgG Titer Negative     E. chaffeensis (HME) IgM Titer Negative     Comment: IgG titers if 1:64 or greater indicate exposure or  acute and  convalescent samples  showing a four-fold increase, and/or the presence  of IgM indicate recent or current infection.        HGE IgG Titer Negative     Comment: HGE IgG levels are detectable 7 to 10 days post infection and persist  approximately one year.        HGE IgM Titer Negative     Comment: Due to a reagent backorder, this test was performed using a different  assay. The reference interval for this alternate assay is:                                         Negative      <1:64                                         Positive       1:64 or greater  IgM levels usually rise 3 to 5 days post infection and fall to normal  levels in approximately 30 to 60 days.       Narrative:       Performed at:  [address] [place] [address]  [id number]  : Demian Batista MD, Phone:  [phone]    POC Glucose Once [[id number]]  (Abnormal) Collected:  04/12/18 1146    Specimen:  Blood Updated:  04/12/18 1151     Glucose 233 (H) mg/dL     Narrative:       Meter: [id number] : [id number] Saunders Devana CNA    POC Glucose Once [[id number]]  (Abnormal) Collected:  04/12/18 0805    Specimen:  Blood Updated:  04/12/18 0808     Glucose 148 (H) mg/dL     Narrative:       Meter: [id number] : [id number] Saunders Devana CNA    C-reactive Protein [[id number]]  (Abnormal) Collected:  04/12/18 0457    Specimen:  Blood Updated:  04/12/18 0603     C-Reactive Protein 12.21 (H) mg/dL     Creatinine, Serum [[id number]]  (Normal) Collected:  04/12/18 0457    Specimen:  Blood Updated:  04/12/18 0603     Creatinine 0.64 mg/dL      eGFR Non African Amer 94 mL/min/1.73     CBC (No Diff) [[id number]]  (Abnormal) Collected:  04/12/18 0457    Specimen:  Blood Updated:  04/12/18 0544     WBC 7.11 10*3/mm3      RBC 3.56 (L) 10*6/mm3      Hemoglobin 11.4 (L) g/dL      Hematocrit 35.1 (L) %      MCV 98.6 (H) fL      MCH 32.0 pg      MCHC 32.5 g/dL      RDW 13.5 (H) %      RDW-SD 48.7 fl      MPV 10.7 fL      Platelets 115 (L) 10*3/mm3     POC  Glucose Once [[id number]]  (Abnormal) Collected:  04/11/18 2015    Specimen:  Blood Updated:  04/11/18 2021     Glucose 163 (H) mg/dL     Narrative:       Meter: [id number] : [id number] Brandon ZAMORA    Babesia Microti Antibody Panel [[id number]] Collected:  04/08/18 1051    Specimen:  Blood Updated:  04/11/18 1908     Babesia microti IgM <1:10     Babesia microti IgG <1:10     Comment: This test was developed and its performance characteristics determined  by [facility]. It has not been cleared or approved by the  [place]. Food and Drug Administration. The FDA has determined that such  clearance or approval is not necessary. This test is used for clinical  purposes. It should not be regarded as investigational or research.       Narrative:       Performed at:  [address] [place] [address]  [id number]  : Demian Batista MD, Phone:  [phone]    POC Glucose Once [[id number]]  (Abnormal) Collected:  04/11/18 1619    Specimen:  Blood Updated:  04/11/18 1622     Glucose 210 (H) mg/dL     Narrative:       Meter: [id number] : [id number] Fiorglo Shen NA    POC Glucose Once [[id number]]  (Abnormal) Collected:  04/11/18 1117    Specimen:  Blood Updated:  04/11/18 1121     Glucose 229 (H) mg/dL     Narrative:       Meter: [id number] : [id number] Akpaglo Shen NA    [place] Spotted Fever, IgM [[id number]] Collected:  04/08/18 1051    Specimen:  Blood Updated:  04/11/18 0717     RMSF IgM 0.44 index      Comment:                                  Negative        <0.90                                   Equivocal 0.90 - 1.10                                   Positive        >1.10       Narrative:       Performed at:  [address] [place] [address]  [id number]  : Demian Batista MD, Phone:  [phone]    Basic Metabolic Panel [[id number]]  (Abnormal) Collected:  04/11/18 0523    Specimen:  Blood Updated:  04/11/18 0623     Glucose  172 (H) mg/dL      BUN 25 (H) mg/dL      Creatinine 0.74 mg/dL      Sodium 134 (L) mmol/L      Potassium 3.7 mmol/L      Chloride 100 mmol/L      CO2 25.6 mmol/L      Calcium 8.7 mg/dL      eGFR Non African Amer 79 mL/min/1.73      BUN/Creatinine Ratio 33.8 (H)     Anion Gap 8.4 mmol/L     Narrative:       GFR Normal >60  Chronic Kidney Disease <60  Kidney Failure <15    [facility] Spotted Fever, IgG [[id number]] Collected:  04/08/18 1051    Specimen:  Blood Updated:  04/11/18 0617     RMSF IgG Negative    Narrative:       Performed at:  [address]  [id number]  : Demian Batista MD, Phone:  [phone]    CBC (No Diff) [[id number]]  (Abnormal) Collected:  04/11/18 0523    Specimen:  Blood Updated:  04/11/18 0603     WBC 6.10 10*3/mm3      RBC 3.44 (L) 10*6/mm3      Hemoglobin 10.9 (L) g/dL      Hematocrit 33.6 (L) %      MCV 97.7 fL      MCH 31.7 pg      MCHC 32.4 g/dL      RDW 13.4 (H) %      RDW-SD 47.9 fl      MPV 10.9 fL      Platelets 91 (L) 10*3/mm3     POC Glucose Once [[id number]]  (Abnormal) Collected:  04/11/18 0501    Specimen:  Blood Updated:  04/11/18 0502     Glucose 153 (H) mg/dL     Narrative:       Meter: [id number] : [id number] Elfalan Brionne NA    POC Glucose Once [[id number]]  (Abnormal) Collected:  04/10/18 2223    Specimen:  Blood Updated:  04/10/18 2224     Glucose 221 (H) mg/dL     Narrative:       Meter: [id number] : [id number] Elfalan Brionne NA        Imaging Results (last 72 hours)     ** No results found for the last 72 hours. **          Medication Review:       Current Facility-Administered Medications:   •  acetaminophen (TYLENOL) suppository 650 mg, 650 mg, Rectal, Q4H PRN, Christian Mcknight MD, 650 mg at 04/08/18 2019  •  acetaminophen (TYLENOL) tablet 650 mg, 650 mg, Oral, Q6H PRN, Maria Luisa Acuna MD, 650 mg at 04/13/18 0835  •  [START ON 4/14/2018] allopurinol (ZYLOPRIM) tablet 300 mg, 300 mg, Oral, Daily, Lyndsay DANIELS  Jani, LUDY  •  dextrose (D50W) solution 25 g, 25 g, Intravenous, Q15 Min PRN, Christian Mcknight MD  •  dextrose (GLUTOSE) oral gel 15 g, 15 g, Oral, Q15 Min PRN, Christian Mcknight MD  •  gabapentin (NEURONTIN) capsule 600 mg, 600 mg, Oral, Q8H, Jerry Russo MD, 600 mg at 04/13/18 1342  •  gadobenate dimeglumine (MULTIHANCE) injection 20 mL, 20 mL, Intravenous, Once in imaging, Paul Key MD  •  glucagon (human recombinant) (GLUCAGEN DIAGNOSTIC) injection 1 mg, 1 mg, Subcutaneous, PRN, Christian Mcknight MD  •  insulin aspart (novoLOG) injection 2-5 Units, 2-5 Units, Subcutaneous, 4x Daily AC & at Bedtime, Mike DING MD, 2 Units at 04/12/18 2121  •  insulin NPH (humuLIN N,novoLIN N) injection 15 Units, 15 Units, Subcutaneous, QAM, Mike DING MD, 15 Units at 04/13/18 0957  •  insulin NPH (humuLIN N,novoLIN N) injection 15 Units, 15 Units, Subcutaneous, Nightly, Mike DING MD, 15 Units at 04/12/18 2121  •  insulin regular (HumuLIN R,NovoLIN R) injection 8 Units, 8 Units, Subcutaneous, Daily With Breakfast & Dinner, Mike DING MD, 8 Units at 04/13/18 0957  •  levoFLOXacin (LEVAQUIN) tablet 750 mg, 750 mg, Oral, Q24H, Kalyan Deleon MD, 750 mg at 04/13/18 1342  •  levothyroxine (SYNTHROID, LEVOTHROID) tablet 137 mcg, 137 mcg, Oral, Q AM, Mike DING MD, 137 mcg at 04/13/18 0704  •  LORazepam (ATIVAN) injection 1 mg, 1 mg, Intravenous, On Call, Nagi Watson MD  •  morphine injection 2 mg, 2 mg, Intravenous, Q3H PRN, Paul Key MD, 2 mg at 04/08/18 1004  •  morphine injection 4 mg, 4 mg, Intravenous, Q4H PRN, Paul Key MD  •  ondansetron (ZOFRAN) injection 4 mg, 4 mg, Intravenous, Q4H PRN, Maria Luisa Acuna MD, 4 mg at 04/09/18 1450  •  potassium chloride (MICRO-K) CR capsule 40 mEq, 40 mEq, Oral, PRN, 40 mEq at 04/10/18 1409 **OR** potassium chloride (KLOR-CON) packet 40 mEq, 40 mEq, Oral, PRN **OR** potassium chloride 10 mEq  in 100 mL IVPB, 10 mEq, Intravenous, Q1H PRN, Paul Key MD  •  potassium chloride 10 mEq in 100 mL IVPB, 10 mEq, Intravenous, Q1H PRN, Maria Luisa Acuna MD, Last Rate: 100 mL/hr at 04/09/18 1458, 10 mEq at 04/09/18 1458  •  sodium chloride 0.9 % flush 1-10 mL, 1-10 mL, Intravenous, PRN, Christian Mcknight MD  •  sodium chloride 0.9 % flush 10 mL, 10 mL, Intravenous, PRN, Antolin Montero MD  •  sodium chloride 0.9 % infusion, 9 mL/hr, Intravenous, Continuous, Maria Luisa Acuna MD, Last Rate: 9 mL/hr at 04/09/18 0821, 9 mL/hr at 04/09/18 0821    Assessment/Plan     Patient Active Problem List   Diagnosis   • Generalized weakness     Uncontrolled type 2 diabetes mellitus with hemoglobin A1c of 10.8%  Uncontrolled type 2 diabetes mellitus with diabetic peripheral neuropathy  Altered mental status  Hypothyroidism on medication  Patient is not clear about the dose I advised the daughter to go home to verify the dose so that she may be started on levothyroxine  Obesity      I discussed further management of patient's diabetes  I discussed the insulin management as well as oral medication  Patient cannot afford insulin pens at this point and wants a cheaper option     Patient recalls using Invokamet.  She tried metformin but which caused stomach issues  She does not recall the names of other medication     Patient's blood sugars have stabilized in the 100-200 range  Patient reports that she still cannot think she can afford Levemir and NovoLog regimen at home  She has used insulin syringes in the past  She is comfortable taking insulin injections.       I discussed further management of diabetes with the patient  She is currently taking NPH insulin 15 units with breakfast and bedtime and regular insulin 8 units before breakfast and before supper  Her blood sugars are mostly below 200  Patient appears comfortable with taking insulin injections which she has done for the past several years with samples receiving from  "primary care physician's office  Will continue to monitor the Accu-Cheks and then adjust insulin as needed    The total time spent for old record and lab review and floor time was more than 35 min of which greater than 20 min of time  ( greater than 50% of the total time )  was spent face to face with the patient counseling and coordination of care owas spent on counseling the patient on recommended evaluation and treatment options, instructions for management/treatment and /or follow up  and importance of compliance with chosen management or treatment options      Mike Chin MD FACE.  04/13/18  5:39 PM      EMR Dragon / transcription disclaimer:     \"Dictated utilizing Dragon dictation\".   "

## 2018-04-13 NOTE — PLAN OF CARE
Problem: Patient Care Overview  Goal: Plan of Care Review   04/13/18 0778   Coping/Psychosocial   Plan of Care Reviewed With patient   OTHER   Outcome Summary Unable to progress ambulation distance today 2' to pain. Improved carryover with safety awareness. Will continue to progress as able.

## 2018-04-13 NOTE — PROGRESS NOTES
PROGRESS NOTE  Patient Name: Elisabet Berry  Age/Sex: 62 y.o. female  : 1955  MRN: [id number]    Date of Admission: 2018  Date of Encounter Visit: 18   LOS: 6 days   Patient Care Team:  Christine Phelps DO as PCP - General (Family Medicine)    Chief Complaint: Bilateral upper extremity weakness    Interval History: Patient came in with syncope with no evidence of stroke on her workup. she does have cellulitis of her left lower extremity and is being followed by infectious disease and vascular surgery.  Patient did have sepsis due to strep group C septicemia secondary to left foot cellulitis from diabetic foot ulcer.  MRI was negative for deep infection, but there were some tendon changes with no fluid collection or other sign of osteomyelitis.  Initially treated with Rocephin 2 g, but was switched to penicillin 4 million units every 4 hours on 4/10/18 with plan to transition to Levaquin 750 mg by mouth every 24 hours until 18.  Repeat blood cultures have been negative.  Patient was supposed to be transferred home today however it was noted that she's having rapidly progressive bilateral upper extremity weakness of unexplained origin.  Today she was unable to even lift her hands against gravity  She is afebrile, she is tolerating by mouth, she still having swelling and redness in the left lower extremity    REVIEW OF SYSTEMS:   CARDIOVASCULAR: No chest pain, chest pressure or chest discomfort. No palpitations , positive edema.   RESPIRATORY: No shortness of breath, cough or sputum.   GASTROINTESTINAL: No anorexia, nausea, vomiting or diarrhea. No abdominal pain or blood.   HEMATOLOGIC: No bleeding or bruising.   NEUROLOGIC: Worsening upper extremity weakness    Ventilator/Non-Invasive Ventilation Settings     None            Vital Signs  Temp:  [97.9 °F (36.6 °C)-99.5 °F (37.5 °C)] 99.5 °F (37.5 °C)  Heart Rate:  [71-87] 78  Resp:  [18] 18  BP: (128-154)/(74-84) 154/84  SpO2:  [96 %-100 %] 98  "%  on    Device (Oxygen Therapy): room air  No intake or output data in the 24 hours ending 04/13/18 1215  Flowsheet Rows    Flowsheet Row First Filed Value   Admission Height 167.6 cm (66\") Documented at 04/07/2018 1854   Admission Weight 102 kg (225 lb) Documented at 04/07/2018 1854        Body mass index is 37.08 kg/m².  1    04/07/18  1854 04/08/18  0100 04/09/18  0851   Weight: 102 kg (225 lb) 107 kg (236 lb 12.4 oz) 107 kg (236 lb 12.4 oz)       Physical Exam:  GEN:  No acute distress, alert, cooperative, well developed, On room air  EYES:   Sclera clear. No icterus. PERRL.  ENT:   External ears/nose normal, no oral lesions, no thrush, mucous membranes moist  NECK:  Supple, midline trachea  LUNGS: Normal chest on inspection, CTAB with diminished bases, no wheezes. No rhonchi. No crackles. Respirations regular, even and unlabored.   CV:  Regular rhythm and rate. Normal S1/S2. No murmurs, gallops, or rubs noted.  ABD:  Soft, non-tender and non-distended. Normal bowel sounds. No guarding  EXT:  Able to move all extremity however she is getting significantly weaker especially over the upper extremity with a strength of 2/5, No cyanosis.  minor left foot  redness.  Positive left lower extremity asymmetric edema.   Skin: Cellulitis around the second and the third toe on the left with some swelling and edema of that right lower extremity, no bleeding, she does have an ulcer at the tip of the toe    Results Review:        Results from last 7 days  Lab Units 04/12/18  0457 04/11/18  0523 04/10/18  0520 04/09/18  0402 04/08/18  0059 04/07/18  1858   SODIUM mmol/L  --  134* 135* 139 139 130*   POTASSIUM mmol/L  --  3.7 3.3* 3.4* 3.6 5.8*   CHLORIDE mmol/L  --  100 101 102 105 91*   CO2 mmol/L  --  25.6 22.6 20.0* 17.1* 24.2   BUN mg/dL  --  25* 27* 25* 22 30*   CREATININE mg/dL 0.64 0.74 0.78 0.93 0.92 1.30*   CALCIUM mg/dL  --  8.7 8.4* 8.5* 6.9* 9.7   AST (SGOT) U/L  --   --   --  44* 30 48*   ALT (SGPT) U/L  --   --   " --  42* 39* 63*   ANION GAP mmol/L  --  8.4 11.4 17.0 16.9 14.8   ALBUMIN g/dL  --   --   --  3.00* 2.80* 4.30       Results from last 7 days  Lab Units 04/09/18  0402 04/08/18  1604 04/08/18  1051  04/07/18  1858   CK TOTAL U/L  --   --   --   --  62   TROPONIN T ng/mL <0.010 <0.010 <0.010  < > <0.010   < > = values in this interval not displayed.            Results from last 7 days  Lab Units 04/12/18  0457 04/11/18  0523 04/10/18  0520 04/09/18  1158 04/09/18  0402 04/08/18  0059 04/07/18  1858   WBC 10*3/mm3 7.11 6.10 6.23  --  6.46 9.46 8.38   HEMOGLOBIN g/dL 11.4* 10.9* 10.9*  --  11.5* 11.2* 14.5   HEMATOCRIT % 35.1* 33.6* 33.4*  --  34.6* 34.9* 42.6   PLATELETS 10*3/mm3 115* 91* 83* 75* 92* 86* 104*   MCV fL 98.6* 97.7 97.1  --  96.4 99.4* 96.2   NEUTROPHIL % %  --   --   --   --   --  88.5* 89.2*   LYMPHOCYTE % %  --   --   --   --   --  7.1* 6.9*   MONOCYTES % %  --   --   --   --   --  4.0* 3.5*   EOSINOPHIL % %  --   --   --   --   --  0.0* 0.1*   BASOPHIL % %  --   --   --   --   --  0.1 0.1   IMM GRAN % %  --   --   --   --   --  0.3 0.2           Results from last 7 days  Lab Units 04/07/18  1858   MAGNESIUM mg/dL 1.8           Invalid input(s): LDLCALC    Results from last 7 days  Lab Units 04/08/18  1606 04/08/18  0015   PH, ARTERIAL pH units 7.469* 7.453*   PCO2, ARTERIAL mm Hg 22.9* 26.6*   PO2 ART mm Hg 71.2* 75.1*   HCO3 ART mmol/L 16.6* 18.6*       Results from last 7 days  Lab Units 04/08/18  0059   HEMOGLOBIN A1C % 10.80*     Glucose   Date/Time Value Ref Range Status   04/13/2018 1144 139 (H) 70 - 130 mg/dL Final   04/13/2018 0712 164 (H) 70 - 130 mg/dL Final   04/12/2018 2102 151 (H) 70 - 130 mg/dL Final   04/12/2018 1736 149 (H) 70 - 130 mg/dL Final   04/12/2018 1715 180 (H) 70 - 130 mg/dL Final   04/12/2018 1146 233 (H) 70 - 130 mg/dL Final   04/12/2018 0805 148 (H) 70 - 130 mg/dL Final   04/11/2018 2015 163 (H) 70 - 130 mg/dL Final       Results from last 7 days  Lab Units 04/08/18  1603  04/08/18  0216 04/07/18  2214 04/07/18  1858   PROCALCITONIN ng/mL  --   --   --  1.08*   LACTATE mmol/L 1.5 2.4* 3.6*  --        Results from last 7 days  Lab Units 04/09/18  0930 04/09/18  0848 04/07/18  2250 04/07/18  2213   BLOODCX  No growth at 4 days No growth at 4 days   Streptococcus, Beta Hemolytic, Group C* No growth at 5 days   GRAM STAIN RESULT   --   --  Anaerobic Bottle Gram positive cocci in chains  Aerobic Bottle Gram positive cocci in chains  --    BCIDPCR   --   --  Streptococcus spp, not A, B, or pneumoniae. Identification by BCID PCR.*  --        Results from last 7 days  Lab Units 04/07/18  1942   NITRITE UA  Negative       Results from last 7 days  Lab Units 04/07/18  2217   ADENOVIRUS DETECTION BY PCR  Not Detected   CORONAVIRUS 229E  Not Detected   CORONAVIRUS HKU1  Not Detected   CORONAVIRUS NL63  Not Detected   CORONAVIRUS OC43  Not Detected   HUMAN METAPNEUMOVIRUS  Not Detected   HUMAN RHINOVIRUS/ENTEROVIRUS  Not Detected   INFLUENZA B PCR  Not Detected   PARAINFLUENZA 1  Not Detected   PARAINFLUENZA VIRUS 2  Not Detected   PARAINFLUENZA VIRUS 3  Not Detected   PARAINFLUENZA VIRUS 4  Not Detected   BORDETELLA PERTUSSIS PCR  Not Detected   CHLAMYDOPHILA PNEUMONIAE PCR  Not Detected   MYCOPLAMA PNEUMO PCR  Not Detected   INFLUENZA A PCR  Not Detected   INFLUENZA A H3  Not Detected   INFLUENZA A H1  Not Detected   RSV, PCR  Not Detected       Results from last 7 days  Lab Units 04/08/18  1604   URIC ACID mg/dL 3.1       Imaging:   Imaging Results (all)     Procedure Component Value Units Date/Time    MRI Foot Left With & Without Contrast [[id number]] Collected:  04/09/18 1011     Updated:  04/09/18 1011    Narrative:       LEFT MID AND FOREFOOT MRI WITH AND WITHOUT CONTRAST     HISTORY: Sepsis with cellulitis and swelling at the 3rd toe.     TECHNIQUE: MRI of the left mid and forefoot was performed with and  without IV contrast. There is no previous exam for comparison.     FINDINGS: Marrow  signal throughout the mid and forefoot is normal.  Specifically, there is no marrow signal abnormality of the 3rd toe.  There is soft tissue edema throughout the mid and forefoot with soft  tissue enhancement along the 3rd toe. No fluid collection is observed at  the 3rd toe. The interphalangeal joints and the metatarsophalangeal  joints demonstrate normal amount of fluid and no abnormal contrast  enhancement.     There is some degenerative change at the 1st and 2nd tarsometatarsal  joints with a small volume of fluid around the plantar edge of the 1st  tarsometatarsal joint. There is also a small volume of fluid in the  flexor hallucis longus tendon sheath at the same level. Some soft tissue  edema and enhancement is observed along the medial plantar surface of  the foot in the same region as the small fluid collections. There is no  marrow signal abnormality around the 1st metatarsophalangeal joint,  however. No other fluid collection is identified in the mid or forefoot.  The flexor and extensor tendons appear intact.       Impression:       Cellulitis at the 3rd toe without evidence of osteomyelitis  or septic arthritis.     There is some fluid in the flexor hallucis longus tendon sheath and  plantar to the 1st metatarsophalangeal joint, favored to be arising from  that joint, with some soft tissue enhancement along the plantar surface  of the foot in the same region. Given the clinical scenario, it is  possible that these changes are infectious. There is no evidence of  osteomyelitis around the visualized midfoot. The entire hindfoot is not  included in this exam.             I reviewed the patient's new clinical results.  I personally viewed and interpreted the patient's imaging results:Chest x-ray showed no acute disease, MRI films findings per radiologist        Medication Review:     allopurinol 200 mg Oral Daily   gabapentin 600 mg Oral Q8H   gadobenate dimeglumine 20 mL Intravenous Once in imaging    insulin aspart 2-5 Units Subcutaneous 4x Daily AC & at Bedtime   insulin NPH 15 Units Subcutaneous QAM   insulin NPH 15 Units Subcutaneous Nightly   insulin regular 8 Units Subcutaneous Daily With Breakfast & Dinner   levoFLOXacin 750 mg Oral Q24H   levothyroxine 137 mcg Oral Q AM         sodium chloride 9 mL/hr Last Rate: 9 mL/hr (04/09/18 0821)       ASSESSMENT:   1. Sepsis with streptococcal C infection from left foot cellulitis  2. Progressive bilateral upper extremity weakness   3. Diabetic left foot ulcer  4. Uncontrolled diabetes mellitus  5. Syncope, negative cardiac/neurology workup  6. History of gout  7. Lactic acidosis on presentation  8. Thrombocytopenia  9. Hypokalemia  10. Hyponatremia    PLAN:  The current regimen with the Novolin 70/30 in the Levaquin oral is very affordable and she can have that available as an outpatient with no insurance however given the progressive worsening upper extremity edema and the weakness patient needs to have an assessment by neurology and address any reversible issue since she is going to be bedridden if that cannot be corrected.  Case was discussed with the nursing staff and discussed with the patient and with the discharge planner  She is not ready for discharge at this point and discharged home with the pen on the neurology consultation and recommendation, meanwhile, will get occupational therapy to address and assess and help with the upper extremity weakness     Disposition:   According to patient and family the insurance company stated that her  has insurance, but she does not and she was supposed to be set up according to her 's employer.  There undergoing current investigation, but at this point she does not have insurance and needs help to afford her medications.  CCP assess the cause of the medication in both the Levaquin and the  70/30 NovoLog may be the best option for affordability.      Jerry Russo MD  04/13/18  12:15 PM    Dictated  utilizing Dragon dictation:  Much of this encounter note is an electronic transcription/translation of spoken language to printed text. The electronic translation of spoken language may permit erroneous, or at times, nonsensical words or phrases to be inadvertently transcribed; Although I have reviewed the note for such errors, some may still exist.

## 2018-04-13 NOTE — PLAN OF CARE
Problem: Patient Care Overview  Goal: Plan of Care Review  Outcome: Ongoing (interventions implemented as appropriate)   04/13/18 1756   Coping/Psychosocial   Plan of Care Reviewed With patient   Plan of Care Review   Progress no change   OTHER   Outcome Summary Patient continues with reddened, swollen left foot. Patient working with PT and able to get up with assist. BG controlled with diet and insulin, scheduled and prn. Neurology seen and MRI ordered. will continue to monitor.     Goal: Individualization and Mutuality  Outcome: Ongoing (interventions implemented as appropriate)   04/13/18 1756   Individualization   Patient Specific Preferences Likes to eat later in AM   Patient Specific Goals (Include Timeframe) BG control   Patient Specific Interventions Insulin as needed, CC diet.     Goal: Discharge Needs Assessment  Outcome: Ongoing (interventions implemented as appropriate)   04/13/18 1756   Discharge Needs Assessment   Readmission Within the Last 30 Days no previous admission in last 30 days   Concerns to be Addressed medication;discharge planning   Patient/Family Anticipates Transition to home with family   Patient/Family Anticipated Services at Transition none   Transportation Concerns car, none   Transportation Anticipated family or friend will provide   Anticipated Changes Related to Illness none   Current Discharge Risk physical impairment   Disability   Equipment Currently Used at Home glucometer       Problem: Skin Injury Risk (Adult)  Goal: Skin Health and Integrity  Outcome: Ongoing (interventions implemented as appropriate)   04/13/18 1756   Skin Injury Risk (Adult)   Skin Health and Integrity making progress toward outcome       Problem: Fall Risk (Adult)  Goal: Absence of Fall  Outcome: Ongoing (interventions implemented as appropriate)   04/13/18 1756   Fall Risk (Adult)   Absence of Fall making progress toward outcome       Problem: Sepsis/Septic Shock (Adult)  Goal: Signs and Symptoms of  Listed Potential Problems Will be Absent, Minimized or Managed (Sepsis/Septic Shock)  Outcome: Ongoing (interventions implemented as appropriate)   04/13/18 1756   Goal/Outcome Evaluation   Problems Assessed (Sepsis) all   Problems Present (Sepsis) glycemic control impaired;situational response       Problem: Pain, Acute (Adult)  Goal: Acceptable Pain Control/Comfort Level  Outcome: Ongoing (interventions implemented as appropriate)   04/13/18 1756   Pain, Acute (Adult)   Acceptable Pain Control/Comfort Level making progress toward outcome

## 2018-04-13 NOTE — NURSING NOTE
Patient states she wants to wait on insulin a little longer as she does not eat this early. She will take PO scheduled med but not insulin at this time. RN will come back shortly to try again.

## 2018-04-13 NOTE — CONSULTS
"  Patient Identification:  NAME:  Elisabet Berry  Age:  62 y.o.   Sex:  female   :  1955   MRN:  [id number]       Chief complaint: I can't raise my arms, reason for consult arm weakness    History of present illness:  This patient is 62-year-old right-handed white female familiar to me with history of gout peripheral neuropathy diabetes who comes in after she states she got \"sepsis\" she got lightheaded dizzy and fell but did not pass out      Past medical history:  Past Medical History:   Diagnosis Date   • Arthritis    • Diabetes mellitus    • Gout    • Peripheral neuropathy        Past surgical history:  No past surgical history on file.    Allergies:  Colchicine; Erythromycin; Sulfa antibiotics; and Tramadol    Home medications:  Prescriptions Prior to Admission   Medication Sig Dispense Refill Last Dose   • ALLOPURINOL PO Take  by mouth. Pt unable to verify rate and dose at this time.      • cyclobenzaprine (FLEXERIL) 10 MG tablet Take 10 mg by mouth Every 8 (Eight) Hours As Needed for Muscle Spasms.      • gabapentin (NEURONTIN) 600 MG tablet Take 600 mg by mouth 3 (Three) Times a Day.      • traZODone (DESYREL) 50 MG tablet Take 50 mg by mouth Every Night.           Hospital medications:    [START ON 2018] allopurinol 300 mg Oral Daily   gabapentin 600 mg Oral Q8H   gadobenate dimeglumine 20 mL Intravenous Once in imaging   insulin aspart 2-5 Units Subcutaneous 4x Daily AC & at Bedtime   insulin NPH 15 Units Subcutaneous QAM   insulin NPH 15 Units Subcutaneous Nightly   insulin regular 8 Units Subcutaneous Daily With Breakfast & Dinner   levoFLOXacin 750 mg Oral Q24H   levothyroxine 137 mcg Oral Q AM       sodium chloride 9 mL/hr Last Rate: 9 mL/hr (18 0821)     •  acetaminophen  •  acetaminophen  •  dextrose  •  dextrose  •  glucagon (human recombinant)  •  Morphine  •  Morphine  •  ondansetron  •  potassium chloride **OR** potassium chloride **OR** potassium chloride  •  potassium " chloride  •  sodium chloride  •  sodium chloride    Family history:  No family history on file.    Social history:  Social History   Substance Use Topics   • Smoking status: Never Smoker   • Smokeless tobacco: Not on file   • Alcohol use Not on file       Review of systems:    Her neck is sore    Objective:  Vitals Ranges:   Temp:  [98.3 °F (36.8 °C)-99.5 °F (37.5 °C)] 98.3 °F (36.8 °C)  Heart Rate:  [71-98] 98  Resp:  [18] 18  BP: (115-154)/(54-84) 115/54      Physical Exam:  Awake alert oriented ×3 in no distress fund of knowledge good attention span and concentration fair recent and remote memory good language function normal well-developed well-nourished in no distress looks older than her stated age pupils one half constricting to 1 normal disc retinas visual fields extraocular movements full without nystagmus nasolabial folds palate tongue symmetrical hearing facial sensation head turning shoulder shrug motor she can't lift either arm above her shoulders  strength is good [place] leg strength is good but may have some spasticity and it no atrophy or fasciculations reflexes trace throughout symmetrical toes downgoing bilaterally sensation normal light touch face both arms and both legs coordination normal in the upper extremities although she can't lift the arms above her shoulders heart regular without murmur neck supple without bruits extremities no clubbing cyanosis edema visual acuity normal at 3 feet    Results review:   I reviewed the patient's new clinical results.    Data review:  Lab Results (last 24 hours)     Procedure Component Value Units Date/Time    POC Glucose Once [[id number]]  (Abnormal) Collected:  04/13/18 1144    Specimen:  Blood Updated:  04/13/18 1146     Glucose 139 (H) mg/dL     Narrative:       Meter: [id number] : [id number] Miya Iglesias CNA    Blood Culture - Blood, [[id number]]  (Normal) Collected:  04/09/18 0930    Specimen:  Blood from Arm, Left Updated:  04/13/18 0946      Blood Culture No growth at 4 days    Blood Culture - Blood, [[id number]]  (Normal) Collected:  04/09/18 0848    Specimen:  Blood from Hand, Left Updated:  04/13/18 0931     Blood Culture No growth at 4 days    POC Glucose Once [[id number]]  (Abnormal) Collected:  04/13/18 0712    Specimen:  Blood Updated:  04/13/18 0713     Glucose 164 (H) mg/dL     Narrative:       Meter: [id number] : [id number] Miya Iglesias CNA    Blood Culture - Blood, [[id number]]  (Normal) Collected:  04/07/18 2213    Specimen:  Blood from Hand, Left Updated:  04/12/18 2231     Blood Culture No growth at 5 days    POC Glucose Once [[id number]]  (Abnormal) Collected:  04/12/18 2102    Specimen:  Blood Updated:  04/12/18 2152     Glucose 151 (H) mg/dL     Narrative:       Meter: [id number] : [id number] Brandon Guevara NA    POC Glucose Once [[id number]]  (Abnormal) Collected:  04/12/18 1736    Specimen:  Blood Updated:  04/12/18 2102     Glucose 149 (H) mg/dL     Narrative:       Meter: [id number] : [id number] Live Dickson RN    POC Glucose Once [[id number]]  (Abnormal) Collected:  04/12/18 1715    Specimen:  Blood Updated:  04/12/18 2058     Glucose 180 (H) mg/dL     Narrative:       Meter: [id number] : [id number] Chip Barton CNA    Ehrlichia Antibody Panel [[id number]] Collected:  04/08/18 1051    Specimen:  Blood Updated:  04/12/18 1711     E. chaffeensis ([facility]) IgG Titer Negative     E. chaffeensis ([facility]) IgM Titer Negative     Comment: IgG titers if 1:64 or greater indicate exposure or  acute and  convalescent samples showing a four-fold increase, and/or the presence  of IgM indicate recent or current infection.        [facility] IgG Titer Negative     Comment: [facility] IgG levels are detectable 7 to 10 days post infection and persist  approximately one year.        [facility] IgM Titer Negative     Comment: Due to a reagent backorder, this test was performed using a different  assay. The reference interval for this alternate assay is:                                          Negative      <1:64                                         Positive       1:64 or greater  IgM levels usually rise 3 to 5 days post infection and fall to normal  levels in approximately 30 to 60 days.       Narrative:       Performed at:  [address] [place] [address]  [id number]  : Demian Batista MD, Phone:  [phone]           Imaging:  Imaging Results (last 24 hours)     ** No results found for the last 24 hours. **             Assessment and Plan:     Active Problems:    Generalized weakness    If occult the lifting arms after a fall I will will check an MRI scan of the cervical spine to rule out myelopathy.  She does not appear to have an acute stroke TIA or central nervous system infection.      Nagi Watson MD  04/13/18  2:47 PM

## 2018-04-13 NOTE — PROGRESS NOTES
"    Patient Name: Elisabet Berry  :1955  62 y.o.      Patient Care Team:  Christine Phelps DO as PCP - General (Family Medicine)    Chief Complaint: follow up syncope    Interval History: She has pain in her left ankle and foot she thinks is from gout. No dyspnea or chest pain.        Objective   Vital Signs  Temp:  [98.3 °F (36.8 °C)-99.5 °F (37.5 °C)] 98.3 °F (36.8 °C)  Heart Rate:  [71-98] 98  Resp:  [18] 18  BP: (115-154)/(54-84) 115/54  No intake or output data in the 24 hours ending 18 1428  Flowsheet Rows    Flowsheet Row First Filed Value   Admission Height 167.6 cm (66\") Documented at 2018   Admission Weight 102 kg (225 lb) Documented at 2018          Physical Exam:   General Appearance:    Alert, cooperative, in no acute distress   Lungs:     Clear to auscultation.  Normal respiratory effort and rate.      Heart:    Regular rhythm and normal rate, normal S1 and S2, no murmurs, gallops or rubs.     Chest Wall:    No abnormalities observed   Abdomen:     Soft, nontender, positive bowel sounds.     Extremities:   no cyanosis, clubbing or edema.  No marked joint deformities.  Adequate musculoskeletal strength.   Left ankle edema      Results Review:      Results from last 7 days  Lab Units 18  0457 18  0523   SODIUM mmol/L  --  134*   POTASSIUM mmol/L  --  3.7   CHLORIDE mmol/L  --  100   CO2 mmol/L  --  25.6   BUN mg/dL  --  25*   CREATININE mg/dL 0.64 0.74   GLUCOSE mg/dL  --  172*   CALCIUM mg/dL  --  8.7       Results from last 7 days  Lab Units 18  0402 18  1604 18  1051  18  1858   CK TOTAL U/L  --   --   --   --  62   TROPONIN T ng/mL <0.010 <0.010 <0.010  < > <0.010   < > = values in this interval not displayed.    Results from last 7 days  Lab Units 18  0457   WBC 10*3/mm3 7.11   HEMOGLOBIN g/dL 11.4*   HEMATOCRIT % 35.1*   PLATELETS 10*3/mm3 115*           Results from last 7 days  Lab Units 18  1858   MAGNESIUM mg/dL " 1.8                   Medication Review:     [START ON 4/14/2018] allopurinol 300 mg Oral Daily   gabapentin 600 mg Oral Q8H   gadobenate dimeglumine 20 mL Intravenous Once in imaging   insulin aspart 2-5 Units Subcutaneous 4x Daily AC & at Bedtime   insulin NPH 15 Units Subcutaneous QAM   insulin NPH 15 Units Subcutaneous Nightly   insulin regular 8 Units Subcutaneous Daily With Breakfast & Dinner   levoFLOXacin 750 mg Oral Q24H   levothyroxine 137 mcg Oral Q AM          sodium chloride 9 mL/hr Last Rate: 9 mL/hr (04/09/18 0821)       Assessment/Plan   1. Syncope  2. Strep bacteremia - mentation is at baseline WBC normal . Defer JAYE unless evidence of worsening infection resistant to treatment plan.   3. Gout - she has been restarted on home allopurinol .   4. Sinus tachycardia- resolved. HR in the 90's.     Nothing further to add from a cardiac standpoint. Will sign off. Please call with questions.      LUDY Woodson  [facility]  04/13/18  2:28 PM

## 2018-04-13 NOTE — PROGRESS NOTES
Continued Stay Note  [facility]     Patient Name: Elisabet Berry  MRN: [id number]  Today's Date: 4/13/2018    Admit Date: 4/7/2018          Discharge Plan     Row Name 04/13/18 1112       Plan    Plan Comments Call placed to [facility] pharmacy- [facility].  Requested cost for levaquin 750mg QD #9, was advised generic cost was $6.41.  Information was given to Dr Russo.  [facility] tomorrow.                        Jimmy Sanchez RN

## 2018-04-14 LAB
BACTERIA SPEC AEROBE CULT: NORMAL
BACTERIA SPEC AEROBE CULT: NORMAL
GLUCOSE BLDC GLUCOMTR-MCNC: 145 MG/DL (ref 70–130)
GLUCOSE BLDC GLUCOMTR-MCNC: 156 MG/DL (ref 70–130)
GLUCOSE BLDC GLUCOMTR-MCNC: 209 MG/DL (ref 70–130)
GLUCOSE BLDC GLUCOMTR-MCNC: 234 MG/DL (ref 70–130)

## 2018-04-14 PROCEDURE — 82962 GLUCOSE BLOOD TEST: CPT

## 2018-04-14 PROCEDURE — 63710000001 INSULIN ASPART PER 5 UNITS: Performed by: INTERNAL MEDICINE

## 2018-04-14 PROCEDURE — 25010000002 MORPHINE PER 10 MG: Performed by: INTERNAL MEDICINE

## 2018-04-14 PROCEDURE — 63710000001 INSULIN ISOPHANE HUMAN PER 5 UNITS: Performed by: INTERNAL MEDICINE

## 2018-04-14 PROCEDURE — 99233 SBSQ HOSP IP/OBS HIGH 50: CPT | Performed by: INTERNAL MEDICINE

## 2018-04-14 PROCEDURE — 99232 SBSQ HOSP IP/OBS MODERATE 35: CPT | Performed by: PSYCHIATRY & NEUROLOGY

## 2018-04-14 PROCEDURE — 25010000002 METHYLPREDNISOLONE PER 125 MG: Performed by: PSYCHIATRY & NEUROLOGY

## 2018-04-14 RX ADMIN — HUMAN INSULIN 8 UNITS: 100 INJECTION, SOLUTION SUBCUTANEOUS at 17:20

## 2018-04-14 RX ADMIN — INSULIN ASPART 3 UNITS: 100 INJECTION, SOLUTION INTRAVENOUS; SUBCUTANEOUS at 22:12

## 2018-04-14 RX ADMIN — GABAPENTIN 600 MG: 300 CAPSULE ORAL at 09:10

## 2018-04-14 RX ADMIN — GABAPENTIN 600 MG: 300 CAPSULE ORAL at 15:08

## 2018-04-14 RX ADMIN — ALLOPURINOL 300 MG: 300 TABLET ORAL at 09:10

## 2018-04-14 RX ADMIN — LEVOTHYROXINE SODIUM 137 MCG: 137 TABLET ORAL at 09:10

## 2018-04-14 RX ADMIN — INSULIN ASPART 2 UNITS: 100 INJECTION, SOLUTION INTRAVENOUS; SUBCUTANEOUS at 17:21

## 2018-04-14 RX ADMIN — HUMAN INSULIN 15 UNITS: 100 INJECTION, SUSPENSION SUBCUTANEOUS at 09:11

## 2018-04-14 RX ADMIN — ACETAMINOPHEN 650 MG: 325 TABLET ORAL at 15:05

## 2018-04-14 RX ADMIN — INSULIN ASPART 3 UNITS: 100 INJECTION, SOLUTION INTRAVENOUS; SUBCUTANEOUS at 12:43

## 2018-04-14 RX ADMIN — LEVOFLOXACIN 750 MG: 750 TABLET, FILM COATED ORAL at 12:43

## 2018-04-14 RX ADMIN — SODIUM CHLORIDE 250 MG: 9 INJECTION, SOLUTION INTRAVENOUS at 17:20

## 2018-04-14 RX ADMIN — GABAPENTIN 600 MG: 300 CAPSULE ORAL at 22:12

## 2018-04-14 RX ADMIN — HUMAN INSULIN 8 UNITS: 100 INJECTION, SOLUTION SUBCUTANEOUS at 09:21

## 2018-04-14 RX ADMIN — MORPHINE SULFATE 2 MG: 2 INJECTION, SOLUTION INTRAMUSCULAR; INTRAVENOUS at 23:23

## 2018-04-14 RX ADMIN — ACETAMINOPHEN 650 MG: 325 TABLET ORAL at 09:21

## 2018-04-14 RX ADMIN — HUMAN INSULIN 15 UNITS: 100 INJECTION, SUSPENSION SUBCUTANEOUS at 22:09

## 2018-04-14 NOTE — PROGRESS NOTES
[facility]  Phone: [phone]  Bob Olivire MD    Subjective:  LOS: 7    Still some leg edema. Unable to raise arms, left worse than right. Localized tenderness in left shoulder > right.    Objective   Vital Signs past 24hrs    Temp range: Temp (24hrs), Av.9 °F (37.2 °C), Min:98.5 °F (36.9 °C), Max:99.4 °F (37.4 °C)    BP range: BP: (112-142)/(56-73) 112/56  Pulse range: Heart Rate:  [75-84] 76  Resp rate range: Resp:  [16-18] 18    Device (Oxygen Therapy): room air   Oxygen range:SpO2:  [94 %-96 %] 95 %      107 kg (236 lb 12.4 oz); Body mass index is 37.08 kg/m².    Intake/Output Summary (Last 24 hours) at 18 1909  Last data filed at 18 0959   Gross per 24 hour   Intake              480 ml   Output                0 ml   Net              480 ml       Physical Exam   Constitutional: She appears well-developed.   HENT:   Head: Normocephalic.   Eyes: Pupils are equal, round, and reactive to light.   Cardiovascular: Normal rate and regular rhythm.    No murmur heard.  Pulmonary/Chest: Effort normal. No respiratory distress. She has no wheezes. She has no rales.   Abdominal: Soft. Bowel sounds are normal. She exhibits no mass. There is no tenderness.   Musculoskeletal: She exhibits edema (1+).   Tender in both shoulders; L>R   Neurological:   Hand  strong  Appears to have proximal weakness both UE   Skin: No rash noted.     Results Review:    I have reviewed the laboratory and imaging data since the last note by [facility] physician.  My annotations are noted in assessment and plan.    Mri Cervical Spine Without Contrast    Result Date: 2018  There has been surgical fusion from C4 through C7 and there is also ankylosis across the C7-T1 disc and across the right C3-4 facet joint. There are several cervical foramina which appear stenotic due to bony overgrowth. No canal stenosis or disc herniation is present. The cord appears normal in caliber and grossly normal in signal. No paraspinous  soft tissue edema or hematoma is identified. There is no evidence of odontoid fracture.  This report was finalized on 4/13/2018 8:47 PM by Dr. Nagi Avalos MD.           Medication Review:  I have reviewed the current MAR.  My annotations are noted in assessment and plan.      Plan   PCCM Problems  Sepsis, left foot cellulitis  Bilateral UE weakness progressive, cervical strain  Bilateral shoulder pain L>R  Recent traumatic fall  Diabetic left foot ulcer  DM, uncontrolled  Syncope, -ve hutton  Thrombocytopenia, mild    Plan of Treatment  Abx per ID recos    Note iv steroids for cervical strain, may need adjustment of insulin per endo.    Shoulder pain bilateral and unable to lift arms, check MRI    DM, now controlled.    Check nocox before discharge.    Bob Olivier MD  04/14/18  7:09 PM    Part of this note may be an electronic transcription/translation of spoken language to printed text using the Dragon Dictation System.

## 2018-04-14 NOTE — SIGNIFICANT NOTE
04/14/18 1251   Rehab Time/Intention   Evaluation Not Performed patient unavailable for evaluation  (with nsg am and now in [id number])   Rehab Treatment   Discipline occupational therapist

## 2018-04-14 NOTE — PROGRESS NOTES
Patient Identification:  NAME:  Elisabet Berry  Age:  62 y.o.   Sex:  female   :  1955   MRN:  [id number]       Chief complaint: Cervical strain bilateral upper extremity weakness    History of present illness:  She thinks her hands are doing better but she still can't lift her arms I reviewed the MRI scan with an independent eyeball review and told her it does not show spinal cord lesion but it does show multilevel disc changes.      Past medical history:  Past Medical History:   Diagnosis Date   • Arthritis    • Diabetes mellitus    • Gout    • Peripheral neuropathy        Allergies:  Colchicine; Erythromycin; Sulfa antibiotics; and Tramadol    Home medications:  Prescriptions Prior to Admission   Medication Sig Dispense Refill Last Dose   • ALLOPURINOL PO Take  by mouth. Pt unable to verify rate and dose at this time.      • cyclobenzaprine (FLEXERIL) 10 MG tablet Take 10 mg by mouth Every 8 (Eight) Hours As Needed for Muscle Spasms.      • gabapentin (NEURONTIN) 600 MG tablet Take 600 mg by mouth 3 (Three) Times a Day.      • traZODone (DESYREL) 50 MG tablet Take 50 mg by mouth Every Night.           Hospital medications:    allopurinol 300 mg Oral Daily   gabapentin 600 mg Oral Q8H   gadobenate dimeglumine 20 mL Intravenous Once in imaging   insulin aspart 2-5 Units Subcutaneous 4x Daily AC & at Bedtime   insulin NPH 15 Units Subcutaneous QAM   insulin NPH 15 Units Subcutaneous Nightly   insulin regular 8 Units Subcutaneous Daily With Breakfast & Dinner   levoFLOXacin 750 mg Oral Q24H   levothyroxine 137 mcg Oral Q AM   methylPREDNISolone sodium succinate 250 mg Intravenous Daily       sodium chloride 9 mL/hr Last Rate: 9 mL/hr (18)     •  acetaminophen  •  acetaminophen  •  dextrose  •  dextrose  •  glucagon (human recombinant)  •  Morphine  •  Morphine  •  ondansetron  •  potassium chloride **OR** potassium chloride **OR** potassium chloride  •  potassium chloride  •  sodium chloride  •   sodium chloride      Objective:  Vitals Ranges:   Temp:  [98.8 °F (37.1 °C)-99.4 °F (37.4 °C)] 98.8 °F (37.1 °C)  Heart Rate:  [75-84] 82  Resp:  [16-18] 16  BP: (131-142)/(63-73) 131/63      Physical Exam:  Normal cranial nerves II through VII tongue is midline good lower extremity strength  strength is much better than yesterday but she still has significant proximal weakness and has difficulty lifting the arms there is some pain reflexes trace throughout symmetrical toes still downgoing bilaterally    Results review:   I reviewed the patient's new clinical results.    Data review:  Lab Results (last 24 hours)     Procedure Component Value Units Date/Time    POC Glucose Once [[id number]]  (Abnormal) Collected:  04/14/18 1146    Specimen:  Blood Updated:  04/14/18 1148     Glucose 234 (H) mg/dL     Narrative:       Meter: [id number] : [id number] [place] Aurea NA    Blood Culture - Blood, [[id number]]  (Normal) Collected:  04/09/18 0930    Specimen:  Blood from Arm, Left Updated:  04/14/18 0946     Blood Culture No growth at 5 days    Blood Culture - Blood, [[id number]]  (Normal) Collected:  04/09/18 0848    Specimen:  Blood from Hand, Left Updated:  04/14/18 0931     Blood Culture No growth at 5 days    POC Glucose Once [[id number]]  (Abnormal) Collected:  04/14/18 0847    Specimen:  Blood Updated:  04/14/18 0848     Glucose 145 (H) mg/dL     Narrative:       Meter: [id number] : [id number] [place] Aurea NA    POC Glucose Once [[id number]]  (Abnormal) Collected:  04/13/18 2104    Specimen:  Blood Updated:  04/13/18 2105     Glucose 133 (H) mg/dL     Narrative:       Meter: [id number] : [id number] Brandon Guevara NA    POC Glucose Once [[id number]]  (Abnormal) Collected:  04/13/18 1645    Specimen:  Blood Updated:  04/13/18 1648     Glucose 217 (H) mg/dL     Narrative:       Meter: [id number] : [id number] Miya Iglesias CNLI           Imaging:  Imaging Results (last 24 hours)     Procedure Component Value  Units Date/Time    MRI Cervical Spine Without Contrast [[id number]] Collected:  04/13/18 2042     Updated:  04/13/18 2050    Narrative:       CERVICAL MRI WITHOUT CONTRAST     HISTORY: Fall a few days ago with a blow to the back of the head.  Bilateral shoulder pain.     TECHNIQUE: MRI of the cervical spine was performed without contrast. The  patient had a difficult time holding still for this exam and fine  anatomic detail is degraded to some degree by motion on most of the  sequences.      COMPARISON: This is correlated with cervical spine CT performed  04/07/2018.      FINDINGS: There has been previous C4-C7 anterior fusion with C5  corpectomy. The degree of osseous fusion was demonstrated to better  advantage on CT. CT also demonstrated ankylosis across the right C3-4  facet joint. Marrow signal throughout the cervical spine is normal.  There is no soft tissue edema around the cervical spine. The  articulation of the skull base with C1 and C1 with C2 appear normal.     At C2-3, the disc appears normal. There is some facet arthropathy on the  left. There appears to be some bony foraminal stenosis on the left. The  right foramen is patent.     At C3-4, there is disc narrowing with facet arthropathy and ankylosis on  the right as already mentioned. The left facet joint is preserved. The  canal is patent. There is some bony foraminal stenosis on the right.     The canal from C4 through C7 appears patent. The foramina at these  levels are not optimally demonstrated but grossly appear patent.     At C7-T1, there is disc narrowing. The canal and the right foramen are  patent. There is severe bony foraminal stenosis on the left. This is  also demonstrated on CT. The CT shows solid ankylosis between the C7 and  the T1 vertebrae. The discs at T1-2 and T2-3 appear normal and the canal  and foramina at those levels are patent.       Impression:       There has been surgical fusion from C4 through C7 and there  is also  ankylosis across the C7-T1 disc and across the right C3-4 facet  joint. There are several cervical foramina which appear stenotic due to  bony overgrowth. No canal stenosis or disc herniation is present. The  cord appears normal in caliber and grossly normal in signal. No  paraspinous soft tissue edema or hematoma is identified. There is no  evidence of odontoid fracture.     This report was finalized on 4/13/2018 8:47 PM by Dr. Nagi Avalos MD.                Assessment and Plan:     This patient has bilateral upper extremity weakness that has improved overnight her  strength is better but she still has difficulty raising the arms I believe this will be due to a cervical strain resulting from her fall I do not see long tract signs and specifically the MRI scan does not show any canal stenosis or spinal cord edema I will let her have IV Solu-Medrol for a couple of days and I suspect this will help any pain and also help the return of her function.  I would note already her arm strength is a lot better.    Nagi Watson MD  04/14/18  3:13 PM

## 2018-04-14 NOTE — PROGRESS NOTES
62 y.o.   LOS: 7 days   Patient Care Team:  Christine Phelps DO as PCP - General (Family Medicine)    Chief Complaint:  Uncontrolled type 2 diabetes mellitus    Chief Complaint   Patient presents with   • Fall     multiple falls today. last fall, hit head. small abrasion with hematoma to back of head. denies loc. denies blood thinner use. last normal was 1330 today.        Subjective     HPI  Patient's blood sugars are mostly in the 130-200 range  She appears comfortable with insulin regimen so far  Patient is eating reasonably well  She is comfortable taking insulin injections at home using insulin syringes      Interval History     Patient is a 62-year-old white female with a past history of that medicine hypertension admitted to the hospital with altered mental status multiple falls and injury to the head and is currently being treated for cellulitis left leg and is currently on antibiotics  Patient's blood sugars were noted to be in the 200-400 range  Her hemoglobin A1c was 10.8 and was consulted for further managing patient's diabetes     Patient's mental status apparently improved as per the daughter at the bedside  Patient is able to answer questions  She reported that she's been diabetic for more than 20 years.  Until approximately 5 years ago she was working and was able to get care  She remembers being on toujeo 60 units daily along with victoza 1.8 mg daily.  She reports that the blood sugars used to be under control  She lost her job approximately 3 years ago and was managing with samples from the doctor's office.  She could not get consistent help with samples and goes through periods of no medication at all  Patient has not been checking her Accu-Cheks but reports that her blood sugars could range from 100-300 range sometimes higher     Patient reports symptoms of diabetic peripheral neuropathy and is currently on gabapentin  Patient denied any knowledge of diabetic nephropathy  She denied any  knowledge of diabetic retinopathy even though her last eye examination was about 2-3 years ago.     At the time of admission patient was noted to be in acute renal failure CT scan of the head was negative for any acute pathology next line patient is currently under nephrology care as well as intensive care specialists     Patient reported significant cost issues with the Levemir and victoza and was not able to use it without samples.   apparently works in construction and was supposed to have insurance but they're still trying to verify that coverage  Review of Systems:      Review of Systems   Eyes: Negative.    Respiratory: Negative.    Cardiovascular: Negative.    Gastrointestinal: Positive for abdominal distention.   Psychiatric/Behavioral: Negative.    All other systems reviewed and are negative.    Objective     Vital Signs   Temp:  [98.3 °F (36.8 °C)-99.5 °F (37.5 °C)] 98.9 °F (37.2 °C)  Heart Rate:  [75-98] 84  Resp:  [18] 18  BP: (115-154)/(54-84) 135/73    Physical Exam:  Physical Exam   Eyes: EOM are normal. Pupils are equal, round, and reactive to light.   Neck: Normal range of motion. Neck supple.   Cardiovascular: Normal rate, regular rhythm, normal heart sounds and intact distal pulses.    Pulmonary/Chest: Effort normal and breath sounds normal.   Abdominal: Soft. Bowel sounds are normal.   Musculoskeletal: Normal range of motion. She exhibits no edema.   Neurological: She is alert.   Skin: Skin is warm and dry.   Psychiatric: She has a normal mood and affect. Her behavior is normal.   Nursing note and vitals reviewed.  Results Review:     I reviewed the patient's new clinical results.      No results found for: GLUCOSE  Lab Results (last 72 hours)     Procedure Component Value Units Date/Time    POC Glucose Once [[id number]]  (Abnormal) Collected:  04/13/18 2104    Specimen:  Blood Updated:  04/13/18 2105     Glucose 133 (H) mg/dL     Narrative:       Meter: [id number] : [id number] Brandon  Darwinaea NA    POC Glucose Once [[id number]]  (Abnormal) Collected:  04/13/18 1645    Specimen:  Blood Updated:  04/13/18 1648     Glucose 217 (H) mg/dL     Narrative:       Meter: [id number] : [id number] Miya Iglesias CNA    POC Glucose Once [[id number]]  (Abnormal) Collected:  04/13/18 1144    Specimen:  Blood Updated:  04/13/18 1146     Glucose 139 (H) mg/dL     Narrative:       Meter: [id number] : [id number] Miya PADILLAA    Blood Culture - Blood, [[id number]]  (Normal) Collected:  04/09/18 0930    Specimen:  Blood from Arm, Left Updated:  04/13/18 0946     Blood Culture No growth at 4 days    Blood Culture - Blood, [[id number]]  (Normal) Collected:  04/09/18 0848    Specimen:  Blood from Hand, Left Updated:  04/13/18 0931     Blood Culture No growth at 4 days    POC Glucose Once [[id number]]  (Abnormal) Collected:  04/13/18 0712    Specimen:  Blood Updated:  04/13/18 0713     Glucose 164 (H) mg/dL     Narrative:       Meter: [id number] : [id number] Miya Iglesias CNA    Blood Culture - Blood, [[id number]]  (Normal) Collected:  04/07/18 2213    Specimen:  Blood from Hand, Left Updated:  04/12/18 2231     Blood Culture No growth at 5 days    POC Glucose Once [[id number]]  (Abnormal) Collected:  04/12/18 2102    Specimen:  Blood Updated:  04/12/18 2152     Glucose 151 (H) mg/dL     Narrative:       Meter: [id number] : [id number] Brandon Guevara NA    POC Glucose Once [[id number]]  (Abnormal) Collected:  04/12/18 1736    Specimen:  Blood Updated:  04/12/18 2102     Glucose 149 (H) mg/dL     Narrative:       Meter: [id number] : [id number] Live Dickson RN    POC Glucose Once [[id number]]  (Abnormal) Collected:  04/12/18 1715    Specimen:  Blood Updated:  04/12/18 2058     Glucose 180 (H) mg/dL     Narrative:       Meter: [id number] : [id number] Chip Barton CNA    Ehrlichia Antibody Panel [[id number]] Collected:  04/08/18 1051    Specimen:  Blood Updated:  04/12/18 1711     E. chaffeensis (HME) IgG  Titer Negative     E. chaffeensis (HME) IgM Titer Negative     Comment: IgG titers if 1:64 or greater indicate exposure or  acute and  convalescent samples showing a four-fold increase, and/or the presence  of IgM indicate recent or current infection.        HGE IgG Titer Negative     Comment: HGE IgG levels are detectable 7 to 10 days post infection and persist  approximately one year.        HGE IgM Titer Negative     Comment: Due to a reagent backorder, this test was performed using a different  assay. The reference interval for this alternate assay is:                                         Negative      <1:64                                         Positive       1:64 or greater  IgM levels usually rise 3 to 5 days post infection and fall to normal  levels in approximately 30 to 60 days.       Narrative:       Performed at:  [address] [place] [address]  [id number]  : Demian Batista MD, Phone:  [phone]    POC Glucose Once [[id number]]  (Abnormal) Collected:  04/12/18 1146    Specimen:  Blood Updated:  04/12/18 1151     Glucose 233 (H) mg/dL     Narrative:       Meter: [id number] : [id number] Saunders Devana CNA    POC Glucose Once [[id number]]  (Abnormal) Collected:  04/12/18 0805    Specimen:  Blood Updated:  04/12/18 0808     Glucose 148 (H) mg/dL     Narrative:       Meter: [id number] : [id number] Saunders Devana CNA    C-reactive Protein [[id number]]  (Abnormal) Collected:  04/12/18 0457    Specimen:  Blood Updated:  04/12/18 0603     C-Reactive Protein 12.21 (H) mg/dL     Creatinine, Serum [[id number]]  (Normal) Collected:  04/12/18 0457    Specimen:  Blood Updated:  04/12/18 0603     Creatinine 0.64 mg/dL      eGFR Non African Amer 94 mL/min/1.73     CBC (No Diff) [[id number]]  (Abnormal) Collected:  04/12/18 0457    Specimen:  Blood Updated:  04/12/18 0544     WBC 7.11 10*3/mm3      RBC 3.56 (L) 10*6/mm3      Hemoglobin 11.4 (L) g/dL      Hematocrit 35.1 (L) %       MCV 98.6 (H) fL      MCH 32.0 pg      MCHC 32.5 g/dL      RDW 13.5 (H) %      RDW-SD 48.7 fl      MPV 10.7 fL      Platelets 115 (L) 10*3/mm3     POC Glucose Once [[id number]]  (Abnormal) Collected:  04/11/18 2015    Specimen:  Blood Updated:  04/11/18 2021     Glucose 163 (H) mg/dL     Narrative:       Meter: [id number] : [id number] Brandon ZAMORA    Babesia Microti Antibody Panel [[id number]] Collected:  04/08/18 1051    Specimen:  Blood Updated:  04/11/18 1908     Babesia microti IgM <1:10     Babesia microti IgG <1:10     Comment: This test was developed and its performance characteristics determined  by [facility]. It has not been cleared or approved by the  [place] Food and Drug Administration. The FDA has determined that such  clearance or approval is not necessary. This test is used for clinical  purposes. It should not be regarded as investigational or research.       Narrative:       Performed at:  [address] [place] [address]  [id number]  : Demian Batista MD, Phone:  [phone]    POC Glucose Once [[id number]]  (Abnormal) Collected:  04/11/18 1619    Specimen:  Blood Updated:  04/11/18 1622     Glucose 210 (H) mg/dL     Narrative:       Meter: [id number] : [id number] Bebo Gotti NA    POC Glucose Once [[id number]]  (Abnormal) Collected:  04/11/18 1117    Specimen:  Blood Updated:  04/11/18 1121     Glucose 229 (H) mg/dL     Narrative:       Meter: [id number] : [id number] Akpaglo Shen NA        Imaging Results (last 72 hours)     Procedure Component Value Units Date/Time    MRI Cervical Spine Without Contrast [[id number]] Collected:  04/13/18 2042     Updated:  04/13/18 2050    Narrative:       CERVICAL MRI WITHOUT CONTRAST     HISTORY: Fall a few days ago with a blow to the back of the head.  Bilateral shoulder pain.     TECHNIQUE: MRI of the cervical spine was performed without contrast. The  patient had a difficult time holding still for  this exam and fine  anatomic detail is degraded to some degree by motion on most of the  sequences.      COMPARISON: This is correlated with cervical spine CT performed  04/07/2018.      FINDINGS: There has been previous C4-C7 anterior fusion with C5  corpectomy. The degree of osseous fusion was demonstrated to better  advantage on CT. CT also demonstrated ankylosis across the right C3-4  facet joint. Marrow signal throughout the cervical spine is normal.  There is no soft tissue edema around the cervical spine. The  articulation of the skull base with C1 and C1 with C2 appear normal.     At C2-3, the disc appears normal. There is some facet arthropathy on the  left. There appears to be some bony foraminal stenosis on the left. The  right foramen is patent.     At C3-4, there is disc narrowing with facet arthropathy and ankylosis on  the right as already mentioned. The left facet joint is preserved. The  canal is patent. There is some bony foraminal stenosis on the right.     The canal from C4 through C7 appears patent. The foramina at these  levels are not optimally demonstrated but grossly appear patent.     At C7-T1, there is disc narrowing. The canal and the right foramen are  patent. There is severe bony foraminal stenosis on the left. This is  also demonstrated on CT. The CT shows solid ankylosis between the C7 and  the T1 vertebrae. The discs at T1-2 and T2-3 appear normal and the canal  and foramina at those levels are patent.       Impression:       There has been surgical fusion from C4 through C7 and there  is also ankylosis across the C7-T1 disc and across the right C3-4 facet  joint. There are several cervical foramina which appear stenotic due to  bony overgrowth. No canal stenosis or disc herniation is present. The  cord appears normal in caliber and grossly normal in signal. No  paraspinous soft tissue edema or hematoma is identified. There is no  evidence of odontoid fracture.     This report was  finalized on 4/13/2018 8:47 PM by Dr. Nagi Avalos MD.             Medication Review:       Current Facility-Administered Medications:   •  acetaminophen (TYLENOL) suppository 650 mg, 650 mg, Rectal, Q4H PRN, Christian Mcknight MD, 650 mg at 04/08/18 2019  •  acetaminophen (TYLENOL) tablet 650 mg, 650 mg, Oral, Q6H PRN, Maria Luisa Acuna MD, 650 mg at 04/13/18 0835  •  allopurinol (ZYLOPRIM) tablet 300 mg, 300 mg, Oral, Daily, Lyndsay Gunter, LUDY  •  dextrose (D50W) solution 25 g, 25 g, Intravenous, Q15 Min PRN, Christian Mcknight MD  •  dextrose (GLUTOSE) oral gel 15 g, 15 g, Oral, Q15 Min PRN, Christian Mcknight MD  •  gabapentin (NEURONTIN) capsule 600 mg, 600 mg, Oral, Q8H, Jerry Russo MD, 600 mg at 04/13/18 2122  •  gadobenate dimeglumine (MULTIHANCE) injection 20 mL, 20 mL, Intravenous, Once in imaging, Paul Key MD  •  glucagon (human recombinant) (GLUCAGEN DIAGNOSTIC) injection 1 mg, 1 mg, Subcutaneous, PRN, Christian Mcknight MD  •  insulin aspart (novoLOG) injection 2-5 Units, 2-5 Units, Subcutaneous, 4x Daily AC & at Bedtime, Mike DING MD, 3 Units at 04/13/18 1741  •  insulin NPH (humuLIN N,novoLIN N) injection 15 Units, 15 Units, Subcutaneous, QAM, Mike DING MD, 15 Units at 04/13/18 0957  •  insulin NPH (humuLIN N,novoLIN N) injection 15 Units, 15 Units, Subcutaneous, Nightly, Mike DING MD, 15 Units at 04/13/18 2122  •  insulin regular (HumuLIN R,NovoLIN R) injection 8 Units, 8 Units, Subcutaneous, Daily With Breakfast & Dinner, Mike DING MD, 8 Units at 04/13/18 1742  •  levoFLOXacin (LEVAQUIN) tablet 750 mg, 750 mg, Oral, Q24H, Kaylan Deleon MD, 750 mg at 04/13/18 1342  •  levothyroxine (SYNTHROID, LEVOTHROID) tablet 137 mcg, 137 mcg, Oral, Q AM, Mike DING MD, 137 mcg at 04/13/18 0704  •  morphine injection 2 mg, 2 mg, Intravenous, Q3H PRN, Paul Key MD, 2 mg at 04/13/18 2122  •  morphine injection 4  mg, 4 mg, Intravenous, Q4H PRN, Paul Key MD  •  ondansetron (ZOFRAN) injection 4 mg, 4 mg, Intravenous, Q4H PRN, Maria Luisa Acuna MD, 4 mg at 04/09/18 1450  •  potassium chloride (MICRO-K) CR capsule 40 mEq, 40 mEq, Oral, PRN, 40 mEq at 04/10/18 1409 **OR** potassium chloride (KLOR-CON) packet 40 mEq, 40 mEq, Oral, PRN **OR** potassium chloride 10 mEq in 100 mL IVPB, 10 mEq, Intravenous, Q1H PRN, Paul Key MD  •  potassium chloride 10 mEq in 100 mL IVPB, 10 mEq, Intravenous, Q1H PRN, Maria Luisa Acuna MD, Last Rate: 100 mL/hr at 04/09/18 1458, 10 mEq at 04/09/18 1458  •  sodium chloride 0.9 % flush 1-10 mL, 1-10 mL, Intravenous, PRN, Christian Mcknight MD  •  sodium chloride 0.9 % flush 10 mL, 10 mL, Intravenous, PRN, Antolin Montero MD  •  sodium chloride 0.9 % infusion, 9 mL/hr, Intravenous, Continuous, Maria Luisa Acuna MD, Last Rate: 9 mL/hr at 04/09/18 0821, 9 mL/hr at 04/09/18 0821    Assessment/Plan     Patient Active Problem List   Diagnosis   • Generalized weakness     Uncontrolled type 2 diabetes mellitus with hemoglobin A1c of 10.8%  Uncontrolled type 2 diabetes mellitus with diabetic peripheral neuropathy  Altered mental status  Hypothyroidism on medication  Patient is not clear about the dose I advised the daughter to go home to verify the dose so that she may be started on levothyroxine  Obesity      I discussed further management of patient's diabetes  I discussed the insulin management as well as oral medication  Patient cannot afford insulin pens at this point and wants a cheaper option     Patient recalls using Invokamet.  She tried metformin but which caused stomach issues  She does not recall the names of other medication  Patient's blood sugars have stabilized in the 100-200 range  Patient reports that she still cannot think she can afford Levemir and NovoLog regimen at home  She has used insulin syringes in the past  She is comfortable taking insulin injections     Patient's blood sugars  "are mostly in the 120-180 range  She has no hypoglycemia  She is eating reasonably well and is tolerating the insulin regimen  She is currently getting NPH insulin 15 units twice daily along with regular insulin 8 units twice daily along with sliding scale    I advised the patient that she will continue this insulin at home but will not use any sliding scale of NovoLog at home  Patient verbalized understanding  Will continue to monitor the Accu-Cheks and then adjust insulin as needed during this hospitalization.    The total time spent for old record and lab review and floor time was more than 35 min of which greater than 20 min of time  ( greater than 50% of the total time )  was spent face to face with the patient counseling and coordination of care owas spent on counseling the patient on recommended evaluation and treatment options, instructions for management/treatment and /or follow up  and importance of compliance with chosen management or treatment options    Mike Chin MD FACE.  04/14/18  7:29 AM      EMR Dragon / transcription disclaimer:     \"Dictated utilizing Dragon dictation\".   "

## 2018-04-14 NOTE — PLAN OF CARE
Problem: Patient Care Overview  Goal: Plan of Care Review  Outcome: Ongoing (interventions implemented as appropriate)   04/13/18 1756 04/13/18 2055 04/14/18 0741   Coping/Psychosocial   Plan of Care Reviewed With --  patient --    Plan of Care Review   Progress no change --  --    OTHER   Outcome Summary --  --  Pt still with reddenned swollen L ankle and foot. FSBG controlled with current regime. MRI ordered. Still with severe weaknes. No signs of distress, will continue to monitor.       Problem: Skin Injury Risk (Adult)  Goal: Skin Health and Integrity  Outcome: Ongoing (interventions implemented as appropriate)      Problem: Fall Risk (Adult)  Goal: Absence of Fall  Outcome: Ongoing (interventions implemented as appropriate)

## 2018-04-15 ENCOUNTER — APPOINTMENT (OUTPATIENT)
Dept: MRI IMAGING | Facility: HOSPITAL | Age: 63
End: 2018-04-15

## 2018-04-15 LAB
ANION GAP SERPL CALCULATED.3IONS-SCNC: 13.2 MMOL/L
BUN BLD-MCNC: 18 MG/DL (ref 8–23)
BUN/CREAT SERPL: 24.7 (ref 7–25)
CALCIUM SPEC-SCNC: 9.6 MG/DL (ref 8.6–10.5)
CHLORIDE SERPL-SCNC: 98 MMOL/L (ref 98–107)
CO2 SERPL-SCNC: 24.8 MMOL/L (ref 22–29)
CREAT BLD-MCNC: 0.73 MG/DL (ref 0.57–1)
DEPRECATED RDW RBC AUTO: 47.9 FL (ref 37–54)
ERYTHROCYTE [DISTWIDTH] IN BLOOD BY AUTOMATED COUNT: 13.3 % (ref 11.7–13)
GFR SERPL CREATININE-BSD FRML MDRD: 81 ML/MIN/1.73
GLUCOSE BLD-MCNC: 274 MG/DL (ref 65–99)
GLUCOSE BLDC GLUCOMTR-MCNC: 281 MG/DL (ref 70–130)
GLUCOSE BLDC GLUCOMTR-MCNC: 331 MG/DL (ref 70–130)
GLUCOSE BLDC GLUCOMTR-MCNC: 344 MG/DL (ref 70–130)
GLUCOSE BLDC GLUCOMTR-MCNC: 367 MG/DL (ref 70–130)
HCT VFR BLD AUTO: 35.7 % (ref 35.6–45.5)
HGB BLD-MCNC: 11.4 G/DL (ref 11.9–15.5)
MCH RBC QN AUTO: 31.2 PG (ref 26.9–32)
MCHC RBC AUTO-ENTMCNC: 31.9 G/DL (ref 32.4–36.3)
MCV RBC AUTO: 97.8 FL (ref 80.5–98.2)
PLATELET # BLD AUTO: 235 10*3/MM3 (ref 140–500)
PMV BLD AUTO: 10 FL (ref 6–12)
POTASSIUM BLD-SCNC: 3.9 MMOL/L (ref 3.5–5.2)
RBC # BLD AUTO: 3.65 10*6/MM3 (ref 3.9–5.2)
SODIUM BLD-SCNC: 136 MMOL/L (ref 136–145)
WBC NRBC COR # BLD: 4.34 10*3/MM3 (ref 4.5–10.7)

## 2018-04-15 PROCEDURE — 82962 GLUCOSE BLOOD TEST: CPT

## 2018-04-15 PROCEDURE — 63710000001 INSULIN ASPART PER 5 UNITS: Performed by: INTERNAL MEDICINE

## 2018-04-15 PROCEDURE — 84443 ASSAY THYROID STIM HORMONE: CPT | Performed by: INTERNAL MEDICINE

## 2018-04-15 PROCEDURE — 25010000002 LORAZEPAM PER 2 MG: Performed by: INTERNAL MEDICINE

## 2018-04-15 PROCEDURE — 63710000001 INSULIN ISOPHANE HUMAN PER 5 UNITS: Performed by: INTERNAL MEDICINE

## 2018-04-15 PROCEDURE — 99231 SBSQ HOSP IP/OBS SF/LOW 25: CPT | Performed by: PSYCHIATRY & NEUROLOGY

## 2018-04-15 PROCEDURE — 25010000002 METHYLPREDNISOLONE PER 125 MG: Performed by: PSYCHIATRY & NEUROLOGY

## 2018-04-15 PROCEDURE — 85027 COMPLETE CBC AUTOMATED: CPT | Performed by: INTERNAL MEDICINE

## 2018-04-15 PROCEDURE — 94762 N-INVAS EAR/PLS OXIMTRY CONT: CPT

## 2018-04-15 PROCEDURE — 99233 SBSQ HOSP IP/OBS HIGH 50: CPT | Performed by: INTERNAL MEDICINE

## 2018-04-15 PROCEDURE — 25010000002 MORPHINE PER 10 MG: Performed by: INTERNAL MEDICINE

## 2018-04-15 PROCEDURE — 80048 BASIC METABOLIC PNL TOTAL CA: CPT | Performed by: INTERNAL MEDICINE

## 2018-04-15 PROCEDURE — 84439 ASSAY OF FREE THYROXINE: CPT | Performed by: INTERNAL MEDICINE

## 2018-04-15 PROCEDURE — 73221 MRI JOINT UPR EXTREM W/O DYE: CPT

## 2018-04-15 RX ORDER — LORAZEPAM 2 MG/ML
1 INJECTION INTRAMUSCULAR
Status: COMPLETED | OUTPATIENT
Start: 2018-04-15 | End: 2018-04-15

## 2018-04-15 RX ORDER — ALPRAZOLAM 0.5 MG/1
0.5 TABLET ORAL
Status: COMPLETED | OUTPATIENT
Start: 2018-04-15 | End: 2018-04-15

## 2018-04-15 RX ADMIN — ALLOPURINOL 300 MG: 300 TABLET ORAL at 09:45

## 2018-04-15 RX ADMIN — LEVOFLOXACIN 750 MG: 750 TABLET, FILM COATED ORAL at 14:17

## 2018-04-15 RX ADMIN — GABAPENTIN 600 MG: 300 CAPSULE ORAL at 07:45

## 2018-04-15 RX ADMIN — INSULIN ASPART 5 UNITS: 100 INJECTION, SOLUTION INTRAVENOUS; SUBCUTANEOUS at 12:00

## 2018-04-15 RX ADMIN — ACETAMINOPHEN 650 MG: 325 TABLET ORAL at 20:46

## 2018-04-15 RX ADMIN — LORAZEPAM 1 MG: 2 INJECTION INTRAMUSCULAR; INTRAVENOUS at 13:27

## 2018-04-15 RX ADMIN — ALPRAZOLAM 0.5 MG: 0.5 TABLET ORAL at 11:27

## 2018-04-15 RX ADMIN — MORPHINE SULFATE 2 MG: 2 INJECTION, SOLUTION INTRAMUSCULAR; INTRAVENOUS at 03:56

## 2018-04-15 RX ADMIN — INSULIN ASPART 5 UNITS: 100 INJECTION, SOLUTION INTRAVENOUS; SUBCUTANEOUS at 22:13

## 2018-04-15 RX ADMIN — INSULIN ASPART 4 UNITS: 100 INJECTION, SOLUTION INTRAVENOUS; SUBCUTANEOUS at 09:45

## 2018-04-15 RX ADMIN — SODIUM CHLORIDE 250 MG: 9 INJECTION, SOLUTION INTRAVENOUS at 14:18

## 2018-04-15 RX ADMIN — HUMAN INSULIN 18 UNITS: 100 INJECTION, SUSPENSION SUBCUTANEOUS at 22:13

## 2018-04-15 RX ADMIN — INSULIN ASPART 5 UNITS: 100 INJECTION, SOLUTION INTRAVENOUS; SUBCUTANEOUS at 17:55

## 2018-04-15 RX ADMIN — HUMAN INSULIN 10 UNITS: 100 INJECTION, SOLUTION SUBCUTANEOUS at 17:55

## 2018-04-15 RX ADMIN — GABAPENTIN 600 MG: 300 CAPSULE ORAL at 20:46

## 2018-04-15 RX ADMIN — GABAPENTIN 600 MG: 300 CAPSULE ORAL at 14:17

## 2018-04-15 RX ADMIN — LEVOTHYROXINE SODIUM 137 MCG: 137 TABLET ORAL at 07:45

## 2018-04-15 NOTE — PROGRESS NOTES
62 y.o.   LOS: 8 days   Patient Care Team:  Christine Phelps DO as PCP - General (Family Medicine)    Chief Complaint:  Uncontrolled type 2 diabetes mellitus with complications    Chief Complaint   Patient presents with   • Fall     multiple falls today. last fall, hit head. small abrasion with hematoma to back of head. denies loc. denies blood thinner use. last normal was 1330 today.        Subjective     HPI  Patient appears to tolerated the insulin regimen so far  Her blood sugars have been in the 150-250 range  Blood sugars have been gradually increasing since yesterday  She reports that she started Solu-Medrol daily in the morning intravenously  Her blood sugars have been elevated subsequently using Solu-Medrol        Interval History     Patient is a 62-year-old white female with a past history of that medicine hypertension admitted to the hospital with altered mental status multiple falls and injury to the head and is currently being treated for cellulitis left leg and is currently on antibiotics  Patient's blood sugars were noted to be in the 200-400 range  Her hemoglobin A1c was 10.8 and was consulted for further managing patient's diabetes     Patient's mental status apparently improved as per the daughter at the bedside  Patient is able to answer questions  She reported that she's been diabetic for more than 20 years.  Until approximately 5 years ago she was working and was able to get care  She remembers being on toujeo 60 units daily along with victoza 1.8 mg daily.  She reports that the blood sugars used to be under control  She lost her job approximately 3 years ago and was managing with samples from the doctor's office.  She could not get consistent help with samples and goes through periods of no medication at all  Patient has not been checking her Accu-Cheks but reports that her blood sugars could range from 100-300 range sometimes higher     Patient reports symptoms of diabetic peripheral  neuropathy and is currently on gabapentin  Patient denied any knowledge of diabetic nephropathy  She denied any knowledge of diabetic retinopathy even though her last eye examination was about 2-3 years ago.     At the time of admission patient was noted to be in acute renal failure CT scan of the head was negative for any acute pathology next line patient is currently under nephrology care as well as intensive care specialists     Patient reported significant cost issues with the Levemir and victoza and was not able to use it without samples.   apparently works in construction and was supposed to have insurance but they're still trying to verify that coverage        Review of Systems   Eyes: Negative.    Respiratory: Negative.    Cardiovascular: Negative.    Gastrointestinal: Positive for abdominal distention.   Psychiatric/Behavioral: Negative.    All other systems reviewed and are negative.    Objective     Vital Signs   Temp:  [97.8 °F (36.6 °C)-99 °F (37.2 °C)] 97.8 °F (36.6 °C)  Heart Rate:  [54-76] 54  Resp:  [16-18] 18  BP: (112-142)/(56-72) 119/61    Physical Exam:  Physical Exam   Constitutional: She appears well-developed.   HENT:   Head: Normocephalic.   Eyes: EOM are normal. Pupils are equal, round, and reactive to light.   Neck: Normal range of motion. Neck supple.   Cardiovascular: Normal rate, regular rhythm, normal heart sounds and intact distal pulses.    No murmur heard.  Pulmonary/Chest: Effort normal and breath sounds normal. No respiratory distress. She has no wheezes. She has no rales.   Abdominal: Soft. Bowel sounds are normal. She exhibits no mass. There is no tenderness.   Musculoskeletal: Normal range of motion. She exhibits edema (1+).   Tender in both shoulders; L>R   Neurological: She is alert.   Hand  strong  Appears to have proximal weakness both UE   Skin: Skin is warm and dry. No rash noted.   Psychiatric: She has a normal mood and affect. Her behavior is normal.    Nursing note and vitals reviewed.  Results Review:     I reviewed the patient's new clinical results.      Glucose   Date/Time Value Ref Range Status   04/15/2018 0509 274 (H) 65 - 99 mg/dL Final     Lab Results (last 72 hours)     Procedure Component Value Units Date/Time    POC Glucose Once [[id number]]  (Abnormal) Collected:  04/15/18 1137    Specimen:  Blood Updated:  04/15/18 1139     Glucose 367 (H) mg/dL     Narrative:       Meter: [id number] : [id number] [employer] CNA    POC Glucose Once [[id number]]  (Abnormal) Collected:  04/15/18 0930    Specimen:  Blood Updated:  04/15/18 0931     Glucose 281 (H) mg/dL     Narrative:       Meter: [id number] : [id number] [employer] CNA    Basic Metabolic Panel [[id number]]  (Abnormal) Collected:  04/15/18 0509    Specimen:  Blood Updated:  04/15/18 0652     Glucose 274 (H) mg/dL      BUN 18 mg/dL      Creatinine 0.73 mg/dL      Sodium 136 mmol/L      Potassium 3.9 mmol/L      Chloride 98 mmol/L      CO2 24.8 mmol/L      Calcium 9.6 mg/dL      eGFR Non African Amer 81 mL/min/1.73      BUN/Creatinine Ratio 24.7     Anion Gap 13.2 mmol/L     Narrative:       GFR Normal >60  Chronic Kidney Disease <60  Kidney Failure <15    CBC (No Diff) [[id number]]  (Abnormal) Collected:  04/15/18 0509    Specimen:  Blood Updated:  04/15/18 0600     WBC 4.34 (L) 10*3/mm3      RBC 3.65 (L) 10*6/mm3      Hemoglobin 11.4 (L) g/dL      Hematocrit 35.7 %      MCV 97.8 fL      MCH 31.2 pg      MCHC 31.9 (L) g/dL      RDW 13.3 (H) %      RDW-SD 47.9 fl      MPV 10.0 fL      Platelets 235 10*3/mm3     POC Glucose Once [[id number]]  (Abnormal) Collected:  04/14/18 2026    Specimen:  Blood Updated:  04/14/18 2034     Glucose 209 (H) mg/dL     Narrative:       Meter: [id number] : [id number] Brain ZAMORA    POC Glucose Once [[id number]]  (Abnormal) Collected:  04/14/18 1653    Specimen:  Blood Updated:  04/14/18 1655     Glucose 156 (H) mg/dL     Narrative:       Meter: [id number]  : [id number] Chip Barton CNA    POC Glucose Once [[id number]]  (Abnormal) Collected:  04/14/18 1146    Specimen:  Blood Updated:  04/14/18 1148     Glucose 234 (H) mg/dL     Narrative:       Meter: [id number] : [id number] Archana Shipleyh NA    Blood Culture - Blood, [[id number]]  (Normal) Collected:  04/09/18 0930    Specimen:  Blood from Arm, Left Updated:  04/14/18 0946     Blood Culture No growth at 5 days    Blood Culture - Blood, [[id number]]  (Normal) Collected:  04/09/18 0848    Specimen:  Blood from Hand, Left Updated:  04/14/18 0931     Blood Culture No growth at 5 days    POC Glucose Once [[id number]]  (Abnormal) Collected:  04/14/18 0847    Specimen:  Blood Updated:  04/14/18 0848     Glucose 145 (H) mg/dL     Narrative:       Meter: [id number] : [id number] Archana Aurea NA    POC Glucose Once [[id number]]  (Abnormal) Collected:  04/13/18 2104    Specimen:  Blood Updated:  04/13/18 2105     Glucose 133 (H) mg/dL     Narrative:       Meter: [id number] : [id number] Brandon Guevara NA    POC Glucose Once [[id number]]  (Abnormal) Collected:  04/13/18 1645    Specimen:  Blood Updated:  04/13/18 1648     Glucose 217 (H) mg/dL     Narrative:       Meter: [id number] : [id number] Miya Kelsie CNA    POC Glucose Once [[id number]]  (Abnormal) Collected:  04/13/18 1144    Specimen:  Blood Updated:  04/13/18 1146     Glucose 139 (H) mg/dL     Narrative:       Meter: [id number] : [id number] Miya Kelsie CNA    POC Glucose Once [[id number]]  (Abnormal) Collected:  04/13/18 0712    Specimen:  Blood Updated:  04/13/18 0713     Glucose 164 (H) mg/dL     Narrative:       Meter: [id number] : [id number] Miya Agette CNA    Blood Culture - Blood, [[id number]]  (Normal) Collected:  04/07/18 2213    Specimen:  Blood from Hand, Left Updated:  04/12/18 2231     Blood Culture No growth at 5 days    POC Glucose Once [[id number]]  (Abnormal) Collected:  04/12/18 2102    Specimen:  Blood Updated:  04/12/18 2152      Glucose 151 (H) mg/dL     Narrative:       Meter: [id number] : [id number] Brandon Guevara NA    POC Glucose Once [[id number]]  (Abnormal) Collected:  04/12/18 1736    Specimen:  Blood Updated:  04/12/18 2102     Glucose 149 (H) mg/dL     Narrative:       Meter: [id number] : [id number] Live Dickson RN    POC Glucose Once [[id number]]  (Abnormal) Collected:  04/12/18 1715    Specimen:  Blood Updated:  04/12/18 2058     Glucose 180 (H) mg/dL     Narrative:       Meter: [id number] : [id number] Saunders Devana CNA    Ehrlichia Antibody Panel [[id number]] Collected:  04/08/18 1051    Specimen:  Blood Updated:  04/12/18 1711     E. chaffeensis (HME) IgG Titer Negative     E. chaffeensis (HME) IgM Titer Negative     Comment: IgG titers if 1:64 or greater indicate exposure or  acute and  convalescent samples showing a four-fold increase, and/or the presence  of IgM indicate recent or current infection.        HGE IgG Titer Negative     Comment: HGE IgG levels are detectable 7 to 10 days post infection and persist  approximately one year.        HGE IgM Titer Negative     Comment: Due to a reagent backorder, this test was performed using a different  assay. The reference interval for this alternate assay is:                                         Negative      <1:64                                         Positive       1:64 or greater  IgM levels usually rise 3 to 5 days post infection and fall to normal  levels in approximately 30 to 60 days.       Narrative:       Performed at:  [address] [place] [address]  [id number]  : Demian Batista MD, Phone:  [phone]    POC Glucose Once [[id number]]  (Abnormal) Collected:  04/12/18 1146    Specimen:  Blood Updated:  04/12/18 1151     Glucose 233 (H) mg/dL     Narrative:       Meter: [id number] : [id number] Saunders Devana CNA        Imaging Results (last 72 hours)     Procedure Component Value Units Date/Time    MRI Cervical  Spine Without Contrast [[id number]] Collected:  04/13/18 2042     Updated:  04/13/18 2050    Narrative:       CERVICAL MRI WITHOUT CONTRAST     HISTORY: Fall a few days ago with a blow to the back of the head.  Bilateral shoulder pain.     TECHNIQUE: MRI of the cervical spine was performed without contrast. The  patient had a difficult time holding still for this exam and fine  anatomic detail is degraded to some degree by motion on most of the  sequences.      COMPARISON: This is correlated with cervical spine CT performed  04/07/2018.      FINDINGS: There has been previous C4-C7 anterior fusion with C5  corpectomy. The degree of osseous fusion was demonstrated to better  advantage on CT. CT also demonstrated ankylosis across the right C3-4  facet joint. Marrow signal throughout the cervical spine is normal.  There is no soft tissue edema around the cervical spine. The  articulation of the skull base with C1 and C1 with C2 appear normal.     At C2-3, the disc appears normal. There is some facet arthropathy on the  left. There appears to be some bony foraminal stenosis on the left. The  right foramen is patent.     At C3-4, there is disc narrowing with facet arthropathy and ankylosis on  the right as already mentioned. The left facet joint is preserved. The  canal is patent. There is some bony foraminal stenosis on the right.     The canal from C4 through C7 appears patent. The foramina at these  levels are not optimally demonstrated but grossly appear patent.     At C7-T1, there is disc narrowing. The canal and the right foramen are  patent. There is severe bony foraminal stenosis on the left. This is  also demonstrated on CT. The CT shows solid ankylosis between the C7 and  the T1 vertebrae. The discs at T1-2 and T2-3 appear normal and the canal  and foramina at those levels are patent.       Impression:       There has been surgical fusion from C4 through C7 and there  is also ankylosis across the C7-T1 disc and  across the right C3-4 facet  joint. There are several cervical foramina which appear stenotic due to  bony overgrowth. No canal stenosis or disc herniation is present. The  cord appears normal in caliber and grossly normal in signal. No  paraspinous soft tissue edema or hematoma is identified. There is no  evidence of odontoid fracture.     This report was finalized on 4/13/2018 8:47 PM by Dr. Nagi Avalos MD.             Medication Review:       Current Facility-Administered Medications:   •  acetaminophen (TYLENOL) suppository 650 mg, 650 mg, Rectal, Q4H PRN, Christian Mcknight MD, 650 mg at 04/08/18 2019  •  acetaminophen (TYLENOL) tablet 650 mg, 650 mg, Oral, Q6H PRN, Maria Luisa Acuna MD, 650 mg at 04/14/18 1505  •  allopurinol (ZYLOPRIM) tablet 300 mg, 300 mg, Oral, Daily, Lyndsay Gunter, APRN, 300 mg at 04/15/18 0945  •  dextrose (D50W) solution 25 g, 25 g, Intravenous, Q15 Min PRN, Christian Mcknight MD  •  dextrose (GLUTOSE) oral gel 15 g, 15 g, Oral, Q15 Min PRN, Christian Mcknight MD  •  gabapentin (NEURONTIN) capsule 600 mg, 600 mg, Oral, Q8H, Jerry Russo MD, 600 mg at 04/15/18 0745  •  gadobenate dimeglumine (MULTIHANCE) injection 20 mL, 20 mL, Intravenous, Once in imaging, Paul Key MD  •  glucagon (human recombinant) (GLUCAGEN DIAGNOSTIC) injection 1 mg, 1 mg, Subcutaneous, PRN, Christian Mcknight MD  •  insulin aspart (novoLOG) injection 2-5 Units, 2-5 Units, Subcutaneous, 4x Daily AC & at Bedtime, Mike DING MD, 4 Units at 04/15/18 0945  •  [START ON 4/16/2018] insulin NPH (humuLIN N,novoLIN N) injection 18 Units, 18 Units, Subcutaneous, QAM, Mike DING MD  •  insulin NPH (humuLIN N,novoLIN N) injection 18 Units, 18 Units, Subcutaneous, Nightly, Mike DING MD  •  insulin regular (HumuLIN R,NovoLIN R) injection 10 Units, 10 Units, Subcutaneous, Daily With Breakfast & Dinner, Mike DING MD  •  levoFLOXacin (LEVAQUIN) tablet 750 mg, 750  mg, Oral, Q24H, Kalyan Deleon MD, 750 mg at 04/14/18 1243  •  levothyroxine (SYNTHROID, LEVOTHROID) tablet 137 mcg, 137 mcg, Oral, Q AM, Mike DING MD, 137 mcg at 04/15/18 0745  •  methylPREDNISolone sodium succinate (SOLU-Medrol) 250 mg in sodium chloride 0.9 % 100 mL IVPB, 250 mg, Intravenous, Daily, Nagi Watson MD, 250 mg at 04/15/18 0945  •  morphine injection 2 mg, 2 mg, Intravenous, Q3H PRN, Paul Key MD, 2 mg at 04/15/18 0356  •  morphine injection 4 mg, 4 mg, Intravenous, Q4H PRN, Paul Key MD  •  ondansetron (ZOFRAN) injection 4 mg, 4 mg, Intravenous, Q4H PRN, Maria Luisa Acuna MD, 4 mg at 04/09/18 1450  •  potassium chloride (MICRO-K) CR capsule 40 mEq, 40 mEq, Oral, PRN, 40 mEq at 04/10/18 1409 **OR** potassium chloride (KLOR-CON) packet 40 mEq, 40 mEq, Oral, PRN **OR** potassium chloride 10 mEq in 100 mL IVPB, 10 mEq, Intravenous, Q1H PRN, Paul Key MD  •  potassium chloride 10 mEq in 100 mL IVPB, 10 mEq, Intravenous, Q1H PRN, Maria Luisa Acuna MD, Last Rate: 100 mL/hr at 04/09/18 1458, 10 mEq at 04/09/18 1458  •  sodium chloride 0.9 % flush 1-10 mL, 1-10 mL, Intravenous, PRN, Christian Mcknight MD  •  sodium chloride 0.9 % flush 10 mL, 10 mL, Intravenous, PRN, Antolin Montero MD  •  sodium chloride 0.9 % infusion, 9 mL/hr, Intravenous, Continuous, Maria Luisa Acuna MD, Last Rate: 9 mL/hr at 04/09/18 0821, 9 mL/hr at 04/09/18 0821    Assessment/Plan     Patient Active Problem List   Diagnosis   • Generalized weakness   Uncontrolled type 2 diabetes mellitus with hemoglobin A1c of 10.8%  Uncontrolled type 2 diabetes mellitus with diabetic peripheral neuropathy  Altered mental status  Hypothyroidism on medication  Patient is not clear about the dose I advised the daughter to go home to verify the dose so that she may be started on levothyroxine  Obesity      Patient's blood sugars are elevated.  The past 24 hours  She reported that she started intravenous Solu-Medrol  "daily in the morning  Since then the blood sugars have been elevated  Patient is eating reasonably well  I will increase the NPH insulin regular insulin once again to control the blood sugars while she is on Solu-Medrol  It is unclear if this is going to be short-term plan solu-medrol versus long-term prednisone orally    Will continue to monitor the Accu-Cheks and then adjust insulin as needed  Due to economic situation patient may only be able to use NPH and regular insulin at home  She appears comfortable with the idea of taking NPH insulin at breakfast and bedtime and regular insulin at breakfast and supper daily    Patient will be followed by Dr. Loco starting Monday 4/16/18      The total time spent for old record and lab review and floor time was more than 35 min of which greater than 20 min of time  ( greater than 50% of the total time )  was spent face to face with the patient counseling and coordination of care owas spent on counseling the patient on recommended evaluation and treatment options, instructions for management/treatment and /or follow up  and importance of compliance with chosen management or treatment options      Mike Chin MD FACE.  04/15/18  11:50 AM      EMR Dragon / transcription disclaimer:     \"Dictated utilizing Dragon dictation\".   "

## 2018-04-15 NOTE — PLAN OF CARE
Problem: Patient Care Overview  Goal: Plan of Care Review  Outcome: Ongoing (interventions implemented as appropriate)   04/14/18 2045 04/15/18 0654   Coping/Psychosocial   Plan of Care Reviewed With patient --    Plan of Care Review   Progress --  improving   OTHER   Outcome Summary --  Pt A&O x 4. Affect and mood significantly improved overnight. Pt was crying and upset over condition at shift change. Overnight has improved with an increase in mobility and ROM ability and less pain. FSGB elevated and treated. Ambulation to BR multiple occasions with minimal assist. No signs of distress, pt in good spirits. Will cnotinue to monitor.       Problem: Skin Injury Risk (Adult)  Goal: Skin Health and Integrity  Outcome: Ongoing (interventions implemented as appropriate)      Problem: Fall Risk (Adult)  Goal: Absence of Fall  Outcome: Ongoing (interventions implemented as appropriate)      Problem: Sepsis/Septic Shock (Adult)  Goal: Signs and Symptoms of Listed Potential Problems Will be Absent, Minimized or Managed (Sepsis/Septic Shock)  Outcome: Ongoing (interventions implemented as appropriate)

## 2018-04-15 NOTE — PROGRESS NOTES
Patient Identification:  NAME:  Elisabet Berry  Age:  62 y.o.   Sex:  female   :  1955   MRN:  [id number]       Chief complaint: Cervical strain, bilateral upper extremity weakness    History of present illness:  She is doing much much better can lift the arms better has less pain and has no complaints      Past medical history:  Past Medical History:   Diagnosis Date   • Arthritis    • Diabetes mellitus    • Gout    • Peripheral neuropathy        Allergies:  Colchicine; Erythromycin; Sulfa antibiotics; and Tramadol    Home medications:  Prescriptions Prior to Admission   Medication Sig Dispense Refill Last Dose   • ALLOPURINOL PO Take  by mouth. Pt unable to verify rate and dose at this time.      • cyclobenzaprine (FLEXERIL) 10 MG tablet Take 10 mg by mouth Every 8 (Eight) Hours As Needed for Muscle Spasms.      • gabapentin (NEURONTIN) 600 MG tablet Take 600 mg by mouth 3 (Three) Times a Day.      • traZODone (DESYREL) 50 MG tablet Take 50 mg by mouth Every Night.           Hospital medications:    allopurinol 300 mg Oral Daily   gabapentin 600 mg Oral Q8H   gadobenate dimeglumine 20 mL Intravenous Once in imaging   insulin aspart 2-5 Units Subcutaneous 4x Daily AC & at Bedtime   [START ON 2018] insulin NPH 18 Units Subcutaneous QAM   insulin NPH 18 Units Subcutaneous Nightly   insulin regular 10 Units Subcutaneous Daily With Breakfast & Dinner   levoFLOXacin 750 mg Oral Q24H   levothyroxine 137 mcg Oral Q AM   methylPREDNISolone sodium succinate 250 mg Intravenous Daily       sodium chloride 9 mL/hr Last Rate: 9 mL/hr (18 0821)     •  acetaminophen  •  acetaminophen  •  dextrose  •  dextrose  •  glucagon (human recombinant)  •  Morphine  •  Morphine  •  ondansetron  •  potassium chloride **OR** potassium chloride **OR** potassium chloride  •  potassium chloride  •  sodium chloride  •  sodium chloride      Objective:  Vitals Ranges:   Temp:  [97.8 °F (36.6 °C)-99 °F (37.2 °C)] 97.8 °F (36.6  °C)  Heart Rate:  [54-76] 54  Resp:  [16-18] 18  BP: (112-142)/(56-72) 119/61      Physical Exam:She's doing very well she states looking can lift her right arm above her head easily left arm is a little sore great  strength definitely much stronger in the upper 70s reflexes depressed throughout toes downgoing bilaterally    Results review:   I reviewed the patient's new clinical results.    Data review:  Lab Results (last 24 hours)     Procedure Component Value Units Date/Time    POC Glucose Once [[id number]]  (Abnormal) Collected:  04/15/18 1137    Specimen:  Blood Updated:  04/15/18 1139     Glucose 367 (H) mg/dL     Narrative:       Meter: [id number] : [id number] [employer] CNA    POC Glucose Once [[id number]]  (Abnormal) Collected:  04/15/18 0930    Specimen:  Blood Updated:  04/15/18 0931     Glucose 281 (H) mg/dL     Narrative:       Meter: [id number] : [id number] [employer] CNA    Basic Metabolic Panel [[id number]]  (Abnormal) Collected:  04/15/18 0509    Specimen:  Blood Updated:  04/15/18 0652     Glucose 274 (H) mg/dL      BUN 18 mg/dL      Creatinine 0.73 mg/dL      Sodium 136 mmol/L      Potassium 3.9 mmol/L      Chloride 98 mmol/L      CO2 24.8 mmol/L      Calcium 9.6 mg/dL      eGFR Non African Amer 81 mL/min/1.73      BUN/Creatinine Ratio 24.7     Anion Gap 13.2 mmol/L     Narrative:       GFR Normal >60  Chronic Kidney Disease <60  Kidney Failure <15    CBC (No Diff) [[id number]]  (Abnormal) Collected:  04/15/18 0509    Specimen:  Blood Updated:  04/15/18 0600     WBC 4.34 (L) 10*3/mm3      RBC 3.65 (L) 10*6/mm3      Hemoglobin 11.4 (L) g/dL      Hematocrit 35.7 %      MCV 97.8 fL      MCH 31.2 pg      MCHC 31.9 (L) g/dL      RDW 13.3 (H) %      RDW-SD 47.9 fl      MPV 10.0 fL      Platelets 235 10*3/mm3     POC Glucose Once [[id number]]  (Abnormal) Collected:  04/14/18 2026    Specimen:  Blood Updated:  04/14/18 2034     Glucose 209 (H) mg/dL     Narrative:       Meter: [id number]  : [id number] Brain ZAMORA    POC Glucose Once [[id number]]  (Abnormal) Collected:  04/14/18 1653    Specimen:  Blood Updated:  04/14/18 1655     Glucose 156 (H) mg/dL     Narrative:       Meter: [id number] : [id number] Chip Barton CNLI           Imaging:  Imaging Results (last 24 hours)     Procedure Component Value Units Date/Time    MRI Shoulder Left Without Contrast [[id number]] Updated:  04/15/18 1358    MRI Shoulder Right Without Contrast [[id number]] Updated:  04/15/18 1353             Assessment and Plan:     Active Problems:    Generalized weakness    She is absolutely better can lift the arms up above her head now and the steroids have worked nicely I will continue them for the time being but she will likely be continuing to improve daily.      Nagi Watson MD  04/15/18  3:22 PM

## 2018-04-15 NOTE — PROGRESS NOTES
[facility]  Phone: [phone]  Bob Olivier MD    Subjective:  LOS: 8    Improved mobility in both shoulders though L remains weaker than R.    Objective   Vital Signs past 24hrs    Temp range: Temp (24hrs), Av.3 °F (36.8 °C), Min:97.8 °F (36.6 °C), Max:99 °F (37.2 °C)    BP range: BP: (119-142)/(61-72) 119/61  Pulse range: Heart Rate:  [54-75] 54  Resp rate range: Resp:  [16-18] 18    Device (Oxygen Therapy): room air   Oxygen range:SpO2:  [96 %-97 %] 97 %      107 kg (236 lb 12.4 oz); Body mass index is 37.08 kg/m².  No intake or output data in the 24 hours ending 04/15/18 1706    Physical Exam   Constitutional: She appears well-developed.   HENT:   Head: Normocephalic.   Eyes: Pupils are equal, round, and reactive to light.   Cardiovascular: Normal rate and regular rhythm.    No murmur heard.  Pulmonary/Chest: Effort normal. No respiratory distress. She has no wheezes. She has no rales.   Abdominal: Soft. Bowel sounds are normal. She exhibits no mass. There is no tenderness.   Musculoskeletal: She exhibits edema (2+ on left with some redness; trace on right).   Tender in both shoulders; L>R   Neurological:   Hand  strong  Appears to have proximal weakness both UE   Skin: No rash noted.     Results Review:    I have reviewed the laboratory and imaging data since the last note by [facility] physician.  My annotations are noted in assessment and plan.    Mri Cervical Spine Without Contrast    Result Date: 2018  There has been surgical fusion from C4 through C7 and there is also ankylosis across the C7-T1 disc and across the right C3-4 facet joint. There are several cervical foramina which appear stenotic due to bony overgrowth. No canal stenosis or disc herniation is present. The cord appears normal in caliber and grossly normal in signal. No paraspinous soft tissue edema or hematoma is identified. There is no evidence of odontoid fracture.  This report was finalized on 2018 8:47 PM by   Nagi Avalos MD.           Medication Review:  I have reviewed the current MAR.  My annotations are noted in assessment and plan.      Plan   PCCM Problems  Sepsis, left foot cellulitis  Bilateral UE weakness progressive, cervical strain  Bilateral shoulder pain L>R  Recent traumatic fall  Diabetic left foot ulcer  DM, uncontrolled  Syncope, -ve hutton  Thrombocytopenia, mild    Plan of Treatment  Abx per ID recos    Note iv steroids for cervical strain, may need adjustment of insulin per endo. Appreciate their help.    Shoulder pain bilateral and unable to lift arms, check MRI - pending results of left shoulder.    DM, now controlled.    Check nocox before discharge. Order for tonight.    Bob Olivier MD  04/15/18  5:06 PM    Part of this note may be an electronic transcription/translation of spoken language to printed text using the Dragon Dictation System.

## 2018-04-16 PROBLEM — L03.90 CELLULITIS: Status: ACTIVE | Noted: 2018-04-16

## 2018-04-16 PROBLEM — E11.65 POORLY CONTROLLED TYPE 2 DIABETES MELLITUS (HCC): Status: ACTIVE | Noted: 2018-04-16

## 2018-04-16 PROBLEM — E03.9 PRIMARY HYPOTHYROIDISM: Status: ACTIVE | Noted: 2018-04-16

## 2018-04-16 PROBLEM — A40.9 STREPTOCOCCAL SEPSIS (HCC): Status: ACTIVE | Noted: 2018-04-16

## 2018-04-16 PROBLEM — L97.521 SKIN ULCER OF TOE OF LEFT FOOT, LIMITED TO BREAKDOWN OF SKIN (HCC): Status: ACTIVE | Noted: 2018-04-16

## 2018-04-16 PROBLEM — E78.49 OTHER HYPERLIPIDEMIA: Status: ACTIVE | Noted: 2018-04-16

## 2018-04-16 LAB
GLUCOSE BLDC GLUCOMTR-MCNC: 233 MG/DL (ref 70–130)
GLUCOSE BLDC GLUCOMTR-MCNC: 316 MG/DL (ref 70–130)
GLUCOSE BLDC GLUCOMTR-MCNC: 339 MG/DL (ref 70–130)
GLUCOSE BLDC GLUCOMTR-MCNC: 361 MG/DL (ref 70–130)
T4 FREE SERPL-MCNC: 1.52 NG/DL (ref 0.93–1.7)
TSH SERPL DL<=0.05 MIU/L-ACNC: 1.57 MIU/ML (ref 0.27–4.2)

## 2018-04-16 PROCEDURE — 97165 OT EVAL LOW COMPLEX 30 MIN: CPT

## 2018-04-16 PROCEDURE — 25010000002 METHYLPREDNISOLONE PER 125 MG: Performed by: PSYCHIATRY & NEUROLOGY

## 2018-04-16 PROCEDURE — 63710000001 INSULIN ASPART PER 5 UNITS: Performed by: INTERNAL MEDICINE

## 2018-04-16 PROCEDURE — 82962 GLUCOSE BLOOD TEST: CPT

## 2018-04-16 PROCEDURE — 99232 SBSQ HOSP IP/OBS MODERATE 35: CPT | Performed by: INTERNAL MEDICINE

## 2018-04-16 PROCEDURE — 99231 SBSQ HOSP IP/OBS SF/LOW 25: CPT | Performed by: PSYCHIATRY & NEUROLOGY

## 2018-04-16 PROCEDURE — 97110 THERAPEUTIC EXERCISES: CPT

## 2018-04-16 PROCEDURE — 63710000001 INSULIN ISOPHANE HUMAN PER 5 UNITS: Performed by: INTERNAL MEDICINE

## 2018-04-16 PROCEDURE — 97535 SELF CARE MNGMENT TRAINING: CPT

## 2018-04-16 RX ORDER — METHYLPREDNISOLONE 4 MG/1
4 TABLET ORAL
Status: DISCONTINUED | OUTPATIENT
Start: 2018-04-16 | End: 2018-04-16

## 2018-04-16 RX ORDER — METHYLPREDNISOLONE 4 MG/1
8 TABLET ORAL
Status: DISCONTINUED | OUTPATIENT
Start: 2018-04-17 | End: 2018-04-16

## 2018-04-16 RX ORDER — METHYLPREDNISOLONE 4 MG/1
4 TABLET ORAL
Status: DISCONTINUED | OUTPATIENT
Start: 2018-04-21 | End: 2018-04-16

## 2018-04-16 RX ORDER — METHYLPREDNISOLONE 4 MG/1
8 TABLET ORAL
Status: DISCONTINUED | OUTPATIENT
Start: 2018-04-16 | End: 2018-04-16

## 2018-04-16 RX ORDER — METHYLPREDNISOLONE 4 MG/1
4 TABLET ORAL
Status: DISCONTINUED | OUTPATIENT
Start: 2018-04-19 | End: 2018-04-16

## 2018-04-16 RX ORDER — METHYLPREDNISOLONE 4 MG/1
4 TABLET ORAL
Status: DISCONTINUED | OUTPATIENT
Start: 2018-04-18 | End: 2018-04-16

## 2018-04-16 RX ORDER — METHYLPREDNISOLONE 4 MG/1
4 TABLET ORAL
Status: DISCONTINUED | OUTPATIENT
Start: 2018-04-17 | End: 2018-04-16

## 2018-04-16 RX ORDER — METHYLPREDNISOLONE 4 MG/1
4 TABLET ORAL 2 TIMES DAILY
Status: DISCONTINUED | OUTPATIENT
Start: 2018-04-20 | End: 2018-04-16

## 2018-04-16 RX ADMIN — ACETAMINOPHEN 650 MG: 325 TABLET ORAL at 12:01

## 2018-04-16 RX ADMIN — HUMAN INSULIN 10 UNITS: 100 INJECTION, SOLUTION SUBCUTANEOUS at 07:47

## 2018-04-16 RX ADMIN — HUMAN INSULIN 25 UNITS: 100 INJECTION, SUSPENSION SUBCUTANEOUS at 22:16

## 2018-04-16 RX ADMIN — LEVOFLOXACIN 750 MG: 750 TABLET, FILM COATED ORAL at 12:01

## 2018-04-16 RX ADMIN — HUMAN INSULIN 18 UNITS: 100 INJECTION, SUSPENSION SUBCUTANEOUS at 07:47

## 2018-04-16 RX ADMIN — ACETAMINOPHEN 650 MG: 325 TABLET ORAL at 23:38

## 2018-04-16 RX ADMIN — GABAPENTIN 600 MG: 300 CAPSULE ORAL at 06:31

## 2018-04-16 RX ADMIN — INSULIN ASPART 3 UNITS: 100 INJECTION, SOLUTION INTRAVENOUS; SUBCUTANEOUS at 12:01

## 2018-04-16 RX ADMIN — GABAPENTIN 600 MG: 300 CAPSULE ORAL at 14:36

## 2018-04-16 RX ADMIN — INSULIN ASPART 5 UNITS: 100 INJECTION, SOLUTION INTRAVENOUS; SUBCUTANEOUS at 18:57

## 2018-04-16 RX ADMIN — SODIUM CHLORIDE 250 MG: 9 INJECTION, SOLUTION INTRAVENOUS at 08:02

## 2018-04-16 RX ADMIN — GABAPENTIN 600 MG: 300 CAPSULE ORAL at 22:16

## 2018-04-16 RX ADMIN — INSULIN ASPART 5 UNITS: 100 INJECTION, SOLUTION INTRAVENOUS; SUBCUTANEOUS at 07:46

## 2018-04-16 RX ADMIN — HUMAN INSULIN 15 UNITS: 100 INJECTION, SOLUTION SUBCUTANEOUS at 19:10

## 2018-04-16 RX ADMIN — ALLOPURINOL 300 MG: 300 TABLET ORAL at 10:01

## 2018-04-16 RX ADMIN — INSULIN ASPART 5 UNITS: 100 INJECTION, SOLUTION INTRAVENOUS; SUBCUTANEOUS at 22:16

## 2018-04-16 RX ADMIN — LEVOTHYROXINE SODIUM 137 MCG: 137 TABLET ORAL at 06:31

## 2018-04-16 NOTE — THERAPY EVALUATION
Acute Care - Occupational Therapy Initial Evaluation  [facility]     Patient Name: Elisabet Berry  : 1955  MRN: [id number]  Today's Date: 2018  Onset of Illness/Injury or Date of Surgery: 18  Date of Referral to OT: 18  Referring Physician: Dr Russo    Admit Date: 2018       ICD-10-CM ICD-9-CM   1. Generalized weakness R53.1 780.79   2. Hyperglycemia R73.9 790.29   3. Dehydration E86.0 276.51   4. Altered mental status, unspecified altered mental status type R41.82 780.97   5. Sepsis, due to unspecified organism A41.9 038.9     995.91     Patient Active Problem List   Diagnosis   • Generalized weakness     Past Medical History:   Diagnosis Date   • Arthritis    • Diabetes mellitus    • Gout    • Peripheral neuropathy      No past surgical history on file.       OT ASSESSMENT FLOWSHEET (last 72 hours)      Occupational Therapy Evaluation     Row Name 18 1121                   OT Evaluation Time/Intention    Subjective Information complains of;weakness  -LE        Document Type evaluation;therapy note (daily note)  -LE        Patient Effort good  -LE        Symptoms Noted During/After Treatment --   states improved B UE movement with steroids  -LE           General Information    Patient Profile Reviewed? yes  -LE        Onset of Illness/Injury or Date of Surgery 18  -LE        Referring Physician Dr Russo  -LE        General Observations of Patient reclined in chair  -LE        Prior Level of Function independent:;driving;ADL's;transfer  -LE        Equipment Currently Used at Home raised toilet  -LE        Pertinent History of Current Functional Problem --   fall, painful UE, additional fall.    -LE        Existing Precautions/Restrictions fall   chronic wound foot  -LE           Relationship/Environment    Primary Source of Support/Comfort child(rishi)   to stay with dght here since  works out of town  -LE        Lives With spouse  -LE        Concerns About Impact on  Relationships --   pt lives in [place]  -LE           Cognitive Assessment/Intervention- PT/OT    Orientation Status (Cognition) oriented x 4  -LE        Follows Commands (Cognition) over 90% accuracy;follows one step commands  -LE        Personal Safety Interventions fall prevention program maintained;gait belt;nonskid shoes/slippers when out of bed  -LE           Bed Mobility Assessment/Treatment    Comment (Bed Mobility) already out of bed  -LE           Functional Mobility    Functional Mobility- Ind. Level supervision required  -LE        Functional Mobility- Device rolling walker  -LE        Functional Mobility-Distance (Feet) 30  -LE           Transfer Assessment/Treatment    Sit-Stand [place] (Transfers) supervision  -LE        Stand-Sit [place] (Transfers) supervision  -LE        [place] Level (Toilet Transfer) supervision  -LE        Assistive Device (Toilet Transfer) walker, front-wheeled  -LE           Sit-Stand Transfer    Assistive Device (Sit-Stand Transfers) walker, front-wheeled  -LE           Stand-Sit Transfer    Assistive Device (Stand-Sit Transfers) walker, front-wheeled  -LE           Toilet Transfer    Type (Toilet Transfer) stand pivot/stand step  -LE           ADL Assessment/Intervention    BADL Assessment/Intervention lower body dressing;bathing;grooming;toileting;feeding  -LE           Bathing Assessment/Intervention    Bathing [place] Level upper body;supervision  -LE        Bathing Position supported standing;sink side  -LE           Lower Body Dressing Assessment/Training    Lower Body Dressing [place] Level doff;don;socks  -LE        Lower Body Dressing Position supported sitting  -LE           Grooming Assessment/Training    [place] Level (Grooming) oral care regimen;wash face, hands;supervision  -LE        Grooming Position supported standing;sink side  -LE           Self-Feeding Assessment/Training    [place] Level (Feeding) --   pt denies assist  needed for self feeding  -LE           Toileting Assessment/Training    [place] Level (Toileting) toileting skills;conditional independence  -LE        Assistive Devices (Toileting) commode, 3-in-1  -LE        Toileting Position supported standing;unsupported sitting  -LE           General ROM    RT Upper Ext --   R UE has returned to near full AROM  -LE        LT Upper Ext Lt Shoulder Flexion   AROM 1/3.  pt states this is improvement from admit  -LE        GENERAL ROM COMMENTS --   L elbow and distal near 7/8 and functional   -LE           Motor Assessment/Interventions    Additional Documentation Therapeutic Exercise (Group)  -LE           Therapeutic Exercise    Additional Documentation --   pt demo 5 reps gentle AROM B UE  -LE           Static Sitting Balance    Level of [place] (Unsupported Sitting, Static Balance) independent  -LE           Static Standing Balance    Level of [place] (Supported Standing, Static Balance) supervision  -LE           Sensory Assessment/Intervention    Sensory General Assessment --   denies numb/tingle B UE  -LE           Positioning and Restraints    Pre-Treatment Position sitting in chair/recliner  -LE        Post Treatment Position chair  -LE        In Chair reclined;encouraged to call for assist;call light within reach;LLE elevated  -LE           Pain Scale: Numbers Pre/Post-Treatment    Pain Scale: Numbers, Pretreatment 3/10  -LE        Pain Scale: Numbers, Post-Treatment 3/10   intermittent neck pain.   -LE        Pain Intervention(s) Repositioned;Rest  -LE           Wound 04/08/18 0015 Left toe diabetic/neuropathic ulceration    Wound - Properties Group Date first assessed: 04/08/18  -KA Time first assessed: 0015  -KA Present On Admission : yes  -KA Side: Left  -KA Location: toe  -KA, 3rd  Type: diabetic/neuropathic ulceration  -KA       Wound 04/08/18 0015 occipital region laceration    Wound - Properties Group Date first assessed: 04/08/18  -KA Time first  assessed: 0015  -KA Present On Admission : yes  -KA Location: occipital region  -KA Type: laceration  -KA Number of Staples Placed: 1  -KA       Clinical Impression (OT)    Date of Referral to OT 04/13/18  -LE        OT Diagnosis need for assist with personal care  -LE        Patient/Family Goals Statement (OT Eval) return to prior leve of assist  -LE        Criteria for Skilled Therapeutic Interventions Met (OT Eval) yes;treatment indicated  -LE        Therapy Frequency (OT Eval) 5 times/wk  -LE        Care Plan Review (OT) evaluation/treatment results reviewed;care plan/treatment goals reviewed  -LE        Anticipated Equipment Needs at Discharge (OT) front wheeled walker;bedside commode   pt states [facility] toilet too low to sit on   -LE        Anticipated Discharge Disposition (OT) --   plan d/c to [facility]'s home since  out of town  -LE           Vital Signs    O2 Delivery Pre Treatment room air  -LE        O2 Delivery Intra Treatment room air  -LE        Post SpO2 (%) 95  -LE        O2 Delivery Post Treatment room air  -LE        Pre Patient Position Sitting  -LE        Intra Patient Position Standing  -LE        Post Patient Position Sitting  -LE        Activity Duration --   no overt short of breath  -LE           Planned OT Interventions    Planned Therapy Interventions (OT Eval) activity tolerance training;BADL retraining;transfer/mobility retraining  -LE           OT Goals    Transfer Goal Selection (OT) transfer, OT goal 1  -LE        Bathing Goal Selection (OT) bathing, OT goal 1  -LE           Transfer Goal 1 (OT)    Activity/Assistive Device (Transfer Goal 1, OT) sit-to-stand/stand-to-sit;bed-to-chair/chair-to-bed;toilet  -LE        [place] Level/Cues Needed (Transfer Goal 1, OT) conditional independence  -LE        Time Frame (Transfer Goal 1, OT) 1 week  -LE           Bathing Goal 1 (OT)    Activity/Assistive Device (Bathing Goal 1, OT) bathing skills, all  -LE        [place] Level/Cues  Needed (Bathing Goal 1, OT) conditional independence  -LE        Time Frame (Bathing Goal 1, OT) 1 week  -LE          User Key  (r) = Recorded By, (t) = Taken By, (c) = Cosigned By    Initials Name Effective Dates    JANIA Esparza OTR 03/07/18 -     JEANNINE Quan RN 01/26/18 -            Occupational Therapy Education     Title: PT OT SLP Therapies (Done)     Topic: Occupational Therapy (Done)     Point: ADL training (Done)     Description: Instruct learner(s) on proper safety adaptation and remediation techniques during self care or transfers.   Instruct in proper use of assistive devices.   Learning Progress Summary     Learner Status Readiness Method Response Comment Documented by    Patient Done PAUL Morales,TB VU,JESUSITA Ed on gentle AROM for B UE to avoid stiffness but also to avoid flare up pain.  Pt agrees and return demo LE 04/16/18 1220                      User Key     Initials Effective Dates Name Provider Type Discipline     03/07/18 -  Ave Esparza OTR Occupational Therapist OT                  OT Recommendation and Plan  Outcome Summary/Treatment Plan (OT)  Anticipated Equipment Needs at Discharge (OT): front wheeled walker, bedside commode (pt states [facility] toilet too low to sit on )  Anticipated Discharge Disposition (OT):  (plan d/c to [facility]'s home since  out of town)  Planned Therapy Interventions (OT Eval): activity tolerance training, BADL retraining, transfer/mobility retraining  Therapy Frequency (OT Eval): 5 times/wk  Plan of Care Review  Plan of Care Reviewed With: patient  Plan of Care Reviewed With: patient  Outcome Summary: Pt presents SBA for ambulation to BR, toileting, grooming.  Pt with improving but still impaired B UE AROM.  Pt may benefit from skilled OT to increase functional AROM UE and safety/independence with ADL.           Outcome Measures     Row Name 04/16/18 1224 04/16/18 1200 04/16/18 1000       How much help from another person do you currently need...    Turning  from your back to your side while in flat bed without using bedrails?  --  -- 4  -MS    Moving from lying on back to sitting on the side of a flat bed without bedrails?  --  -- 4  -MS    Moving to and from a bed to a chair (including a wheelchair)?  --  -- 3  -MS    Standing up from a chair using your arms (e.g., wheelchair, bedside chair)?  --  -- 3  -MS    Climbing 3-5 steps with a railing?  --  -- 3  -MS    To walk in hospital room?  --  -- 3  -MS    AM-PAC 6 Clicks Score  --  -- 20  -MS       How much help from another is currently needed...    Putting on and taking off regular lower body clothing? 3  -LE  --  --    Bathing (including washing, rinsing, and drying) 3  -LE  --  --    Toileting (which includes using toilet bed pan or urinal) 4  -LE  --  --    Putting on and taking off regular upper body clothing 3  -LE  --  --    Taking care of personal grooming (such as brushing teeth) 3  -LE  --  --    Eating meals 4  -LE  --  --    Score 20  -LE  --  --       Functional Assessment    Outcome Measure Options  -- AM-PAC 6 Clicks Daily Activity (OT)  -LE AM-PAC 6 Clicks Basic Mobility (PT)  -MS    Row Name 04/13/18 1400             How much help from another person do you currently need...    Turning from your back to your side while in flat bed without using bedrails? 4  -MA      Moving from lying on back to sitting on the side of a flat bed without bedrails? 4  -MA      Moving to and from a bed to a chair (including a wheelchair)? 2  -MA      Standing up from a chair using your arms (e.g., wheelchair, bedside chair)? 2  -MA      Climbing 3-5 steps with a railing? 2  -MA      To walk in hospital room? 3  -MA      AM-PAC 6 Clicks Score 17  -MA         Functional Assessment    Outcome Measure Options AM-PAC 6 Clicks Basic Mobility (PT)  -MA        User Key  (r) = Recorded By, (t) = Taken By, (c) = Cosigned By    Initials Name Provider Type    JANIA Esparza, OTR Occupational Therapist    MS Gian Allen, PT  Physical Therapist    VLADISLAV Harris, PT Physical Therapist          Time Calculation:   OT Start Time: 1051  OT Stop Time: 1121  OT Time Calculation (min): 30 min    Therapy Charges for Today     Code Description Service Date Service Provider Modifiers Qty    56552386653 HC OT EVAL LOW COMPLEXITY 2 4/16/2018 Ave Esparza OTR GO 1    58824152725 HC OT SELF CARE/MGMT/TRAIN EA 15 MIN 4/16/2018 Ave Esparza OTR GO 1    [id number]  OT THER PROC EA 15 MIN 4/16/2018 Ave Esparza OTR GO 1               Ave Epsarza OTR  4/16/2018

## 2018-04-16 NOTE — PROGRESS NOTES
Patient Identification:  NAME:  Elisabet Berry  Age:  62 y.o.   Sex:  female   :  1955   MRN:  [id number]       Chief complaint: Cervical strain upper extremity weakness    History of present illness:  She's doing well and will be going home of her shoulder pain is better in the left greater than right shoulder and that's probably played some role in this to0.   her sugars have been running high and she is been on steroids      Past medical history:  Past Medical History:   Diagnosis Date   • Arthritis    • Diabetes mellitus    • Gout    • Peripheral neuropathy        Allergies:  Colchicine; Erythromycin; Sulfa antibiotics; and Tramadol    Home medications:  Prescriptions Prior to Admission   Medication Sig Dispense Refill Last Dose   • ALLOPURINOL PO Take  by mouth. Pt unable to verify rate and dose at this time.      • cyclobenzaprine (FLEXERIL) 10 MG tablet Take 10 mg by mouth Every 8 (Eight) Hours As Needed for Muscle Spasms.      • gabapentin (NEURONTIN) 600 MG tablet Take 600 mg by mouth 3 (Three) Times a Day.      • traZODone (DESYREL) 50 MG tablet Take 50 mg by mouth Every Night.           Hospital medications:    allopurinol 300 mg Oral Daily   gabapentin 600 mg Oral Q8H   gadobenate dimeglumine 20 mL Intravenous Once in imaging   insulin aspart 2-5 Units Subcutaneous 4x Daily AC & at Bedtime   insulin NPH 18 Units Subcutaneous QAM   insulin NPH 18 Units Subcutaneous Nightly   insulin regular 10 Units Subcutaneous Daily With Breakfast & Dinner   levoFLOXacin 750 mg Oral Q24H   levothyroxine 137 mcg Oral Q AM   methylPREDNISolone sodium succinate 250 mg Intravenous Daily       sodium chloride 9 mL/hr Last Rate: 9 mL/hr (18)     •  acetaminophen  •  acetaminophen  •  dextrose  •  dextrose  •  glucagon (human recombinant)  •  Morphine  •  Morphine  •  ondansetron  •  potassium chloride **OR** potassium chloride **OR** potassium chloride  •  potassium chloride  •  sodium chloride  •  sodium  chloride      Objective:  Vitals Ranges:   Temp:  [97.4 °F (36.3 °C)-98.5 °F (36.9 °C)] 97.8 °F (36.6 °C)  Heart Rate:  [49-80] 80  Resp:  [15-18] 18  BP: (115-147)/(57-86) 115/67      Physical Exam:  Awake alert oriented ×3 normal cranial nerves II through VII lifts both arms up well but the left arm is a little sore in the shoulder good  strength good toe wiggle reflexes trace throughout symmetrical toes downgoing bilaterally    Results review:   I reviewed the patient's new clinical results.    Data review:  Lab Results (last 24 hours)     Procedure Component Value Units Date/Time    POC Glucose Once [[id number]]  (Abnormal) Collected:  04/16/18 1653    Specimen:  Blood Updated:  04/16/18 1655     Glucose 361 (H) mg/dL     Narrative:       Meter: [id number] : [id number] Sarahze Tramaineuza NA    POC Glucose Once [[id number]]  (Abnormal) Collected:  04/16/18 1125    Specimen:  Blood Updated:  04/16/18 1127     Glucose 233 (H) mg/dL     Narrative:       Meter: [id number] : [id number] Makharadze Firuza NA    POC Glucose Once [[id number]]  (Abnormal) Collected:  04/16/18 0714    Specimen:  Blood Updated:  04/16/18 0715     Glucose 316 (H) mg/dL     Narrative:       Meter: [id number] : [id number] Makhuseyinadze Tramaineuza NA    POC Glucose Once [[id number]]  (Abnormal) Collected:  04/15/18 2123    Specimen:  Blood Updated:  04/15/18 2124     Glucose 344 (H) mg/dL     Narrative:       Meter: [id number] : [id number] Vincent Dawkins NA           Imaging:  Imaging Results (last 24 hours)     Procedure Component Value Units Date/Time    MRI Shoulder Left Without Contrast [[id number]] Collected:  04/16/18 1007     Updated:  04/16/18 1332    Narrative:       LEFT SHOULDER MRI WITHOUT CONTRAST     HISTORY: Neck and shoulder pain.     TECHNIQUE: MRI of the left shoulder including axial and oblique coronal  proton density, oblique coronal T1, and oblique sagittal T2 sequences  was performed. The patient had difficulty  holding still and was unable  to complete the coronal T2 sequence. The exam is somewhat limited by the  extensive motion but there is significant diagnostic yield and to avoid  further delay in the patient's care I am proceeding with the report.     FINDINGS: Marrow signal around the shoulder is normal. There is no joint  or bursal effusion. Advanced arthritic change is observed at the  glenohumeral joint. There is some bony remodeling and flattening of the  articular surface of the humeral head with marginal osteophytes.  Full-thickness chondral loss is observed. Glenoid anteversion appears  normal. The long head biceps tendon appears to be grossly intact  although tendinopathy or partial thickness tear would be obscured. The  axial proton density and the coronal T1 images shows what appears to be  susceptibility artifact in the region of the distal supraspinatus and  infraspinatus tendons, consistent with previous shoulder surgery. There  also appears to have been previous acromioplasty. Shoulder musculature  is globally deconditioned but there is no musculotendinous retraction  nor any focal muscle atrophy of any of the rotator cuff muscles. A small  rotator cuff tear/recurrent tear, particularly at the distal  supraspinatus tendon, might be obscured. There is no evidence of any  complete supraspinatus tendon tear. The infraspinatus, teres minor, and  subscapularis appear intact. There is no lesion in the spinoglenoid  notch, quadrilateral space, nor the visualized axilla.       Impression:       Fine anatomic detail on this exam is limited by motion and  one of the usually included sequences was not run as the patient was  unable to tolerate the full exam. There is clearly advanced degenerative  change at the glenohumeral joint. There appears to have been previous  left shoulder surgery, possibly supraspinatus tendon repair. A small  partial-thickness or full-thickness recurrent tear at the supraspinatus  might  be obscured on this exam. There is no evidence of any complete  recurrent rotator cuff tear. No soft tissue edema, joint, or bursal  effusion is present to suggest active inflammation or infection.     This report was finalized on 4/16/2018 1:29 PM by Dr. Nagi Avalos MD.                Assessment and Plan:     Active Problems:    Generalized weakness  At this point I believe she is likely at or near her neurologic baseline she is raising the arms well and I believe that the cervical strain is significantly better she does have some shoulder problems left greater than right that plays a role in her decreased range of motion as well and the shoulder pain itself.  Steroids have been helpful.  I would discontinue theIV steroids at this point and I will go ahead and give her a Medrol Dosepak since she has had such a good response and it is been helpful also for the shoulder pain.  I have written for this Medrol Dosepak in the orders.  I'm okay with her being discharged home thank you  Nagi Watson MD  04/16/18  5:25 PM

## 2018-04-16 NOTE — NURSING NOTE
Per Dr. Olivier, hold off on MRI right shoulder and get order of MRI left shoulder read.  Steffany Castillo RN

## 2018-04-16 NOTE — THERAPY TREATMENT NOTE
"Acute Care - Physical Therapy Treatment Note  [facility]     Patient Name: Elisabet Berry  : 1955  MRN: [id number]  Today's Date: 2018     Date of Referral to PT: 18  Referring Physician: Karan    Admit Date: 2018    Visit Dx:    ICD-10-CM ICD-9-CM   1. Generalized weakness R53.1 780.79   2. Hyperglycemia R73.9 790.29   3. Dehydration E86.0 276.51   4. Altered mental status, unspecified altered mental status type R41.82 780.97   5. Sepsis, due to unspecified organism A41.9 038.9     995.91     Patient Active Problem List   Diagnosis   • Generalized weakness       Therapy Treatment          Rehabilitation Treatment Summary     Row Name 18 1033             Treatment Time/Intention    Discipline physical therapist  -MS      Document Type therapy note (daily note)  -MS      Subjective Information complains of;pain  -MS      Mode of Treatment individual therapy  -MS      Patient Effort good  -MS      Comment Pt. reports feeling \"better\" this day compared to the past few days with decreased overall pain in her Left foot.  -MS      Recorded by [MS] Gian Allen, PT 18 1035 18 1035      Row Name 18 1033             Cognitive Assessment/Intervention- PT/OT    Orientation Status (Cognition) oriented x 3  -MS      Follows Commands (Cognition) WNL  -MS      Personal Safety Interventions fall prevention program maintained;gait belt;nonskid shoes/slippers when out of bed;supervised activity  -MS      Recorded by [MS] Gian Allen, PT 18 1035 18 1035      Row Name 18 1033             Mobility Assessment/Intervention    Left Lower Extremity (Weight-bearing Status) weight-bearing as tolerated (WBAT)  -MS      Recorded by [MS] Gian Allen, PT 18 1035 18 1035      Row Name 18 1033             Bed Mobility Assessment/Treatment    Supine-Sit [place] (Bed Mobility) independent  -MS      Recorded by [MS] Gian Allen, PT 18 " 1035 04/16/18 1035      Row Name 04/16/18 1033             Transfer Assessment/Treatment    Sit-Stand Divide (Transfers) stand by assist  -MS      Stand-Sit Divide (Transfers) stand by assist  -MS      Recorded by [MS] Gian Allen, PT 04/16/18 1035 04/16/18 1035      Row Name 04/16/18 1033             Sit-Stand Transfer    Assistive Device (Sit-Stand Transfers) walker, front-wheeled  -MS      Recorded by [MS] Gian Allen, PT 04/16/18 1035 04/16/18 1035      Row Name 04/16/18 1033             Stand-Sit Transfer    Assistive Device (Stand-Sit Transfers) walker, front-wheeled  -MS      Recorded by [MS] Gian Allen, PT 04/16/18 1035 04/16/18 1035      Row Name 04/16/18 1033             Gait/Stairs Assessment/Training    Divide Level (Gait) contact guard  -MS      Assistive Device (Gait) walker, front-wheeled  -MS      Distance in Feet (Gait) 120 feet  -MS      Pattern (Gait) step-through  -MS      Deviations/Abnormal Patterns (Gait) antalgic  -MS      Recorded by [MS] Gian Allen, PT 04/16/18 1035 04/16/18 1035      Row Name 04/16/18 1033             Positioning and Restraints    Pre-Treatment Position in bed  -MS      Post Treatment Position chair  -MS      In Chair notified nsg;reclined;sitting;call light within reach;encouraged to call for assist   All lines intact.   -MS      Recorded by [MS] Gian Allen, PT 04/16/18 1035 04/16/18 1035      Row Name 04/16/18 1033             Pain Scale: Numbers Pre/Post-Treatment    Pain Scale: Numbers, Pretreatment 3/10  -MS      Pain Scale: Numbers, Post-Treatment 3/10  -MS      Pain Location - Side Left  -MS      Pain Location foot  -MS      Pain Intervention(s) Medication (See MAR);Repositioned;Elevated;Rest  -MS      Recorded by [MS] Gian Allen, PT 04/16/18 1035 04/16/18 1035      Row Name                Wound 04/08/18 0015 Left toe diabetic/neuropathic ulceration    Wound - Properties Group Date first assessed: 04/08/18 [KA]  Time first assessed: 0015 [KA] Present On Admission : yes [KA] Side: Left [KA] Location: toe [KA], 3rd  Type: diabetic/neuropathic ulceration [KA] Recorded by:  [KA] Abigail Quan RN 04/08/18 0206 04/08/18 0206    Row Name                Wound 04/08/18 0015 occipital region laceration    Wound - Properties Group Date first assessed: 04/08/18 [KA] Time first assessed: 0015 [KA] Present On Admission : yes [KA] Location: occipital region [KA] Type: laceration [KA] Number of Staples Placed: 1 [KA] Recorded by:  [KA] Abigail Quan RN 04/08/18 0209 04/08/18 0209      User Key  (r) = Recorded By, (t) = Taken By, (c) = Cosigned By    Initials Name Effective Dates Discipline    JEANNINE Quan RN 01/26/18 -  Nurse    MS Gian Allen, PT 04/03/18 -  PT          Wound 04/08/18 0015 Left toe diabetic/neuropathic ulceration (Active)   Dressing Appearance open to air 4/16/2018  7:57 AM   Closure None 4/16/2018  7:57 AM   Base black eschar 4/16/2018  7:57 AM   Periwound edematous;redness 4/16/2018  7:57 AM   Periwound Temperature warm 4/16/2018  7:57 AM   Periwound Skin Turgor soft 4/16/2018  7:57 AM   Edges irregular 4/16/2018  7:57 AM   Drainage Amount none 4/16/2018  7:57 AM   Care, Wound cleansed with 4/16/2018  7:57 AM   Dressing Care, Wound open to air 4/16/2018  7:57 AM       Wound 04/08/18 0015 occipital region laceration (Active)   Dressing Appearance open to air 4/16/2018  7:57 AM   Closure Staples 4/16/2018  7:57 AM   Base closed/resurfaced 4/16/2018  7:57 AM   Periwound ecchymotic;redness 4/15/2018  8:55 PM   Drainage Amount none 4/16/2018  7:57 AM   Dressing Care, Wound open to air 4/16/2018  7:57 AM             Physical Therapy Education     Title: PT OT SLP Therapies (Done)     Topic: Physical Therapy (Done)     Point: Mobility training (Done)    Learning Progress Summary     Learner Status Readiness Method Response Comment Documented by    Patient Done Acceptance EPAUL,NR  MS 04/16/18 1035     Done  Acceptance E VU  MA 04/13/18 1455     Done Acceptance E VU  MA 04/12/18 1045     Active Acceptance E NR  MA 04/11/18 1610          Point: Body mechanics (Done)    Learning Progress Summary     Learner Status Readiness Method Response Comment Documented by    Patient Done Acceptance PAUL GENTILENR  MS 04/16/18 1035     Done Acceptance E VU  MA 04/13/18 1455     Done Acceptance E VU  MA 04/12/18 1045     Active Acceptance E NR  MA 04/11/18 1610          Point: Precautions (Done)    Learning Progress Summary     Learner Status Readiness Method Response Comment Documented by    Patient Done Acceptance PAUL GENTILE,NR  MS 04/16/18 1035     Done Acceptance E VU  MA 04/13/18 1455     Done Acceptance E VU  MA 04/12/18 1045     Active Acceptance E NR  MA 04/11/18 1610                      User Key     Initials Effective Dates Name Provider Type Discipline    MS 04/03/18 -  Gian Allen, PT Physical Therapist PT    MA 04/03/18 -  Shyla Harris, PT Physical Therapist PT                    PT Recommendation and Plan     Plan of Care Reviewed With: patient  Outcome Summary: Improved tolerance to functional activity this day with an increase in gait distance and decreased assist required for overall functional mobility.          Outcome Measures     Row Name 04/16/18 1000 04/13/18 1400          How much help from another person do you currently need...    Turning from your back to your side while in flat bed without using bedrails? 4  -MS 4  -MA     Moving from lying on back to sitting on the side of a flat bed without bedrails? 4  -MS 4  -MA     Moving to and from a bed to a chair (including a wheelchair)? 3  -MS 2  -MA     Standing up from a chair using your arms (e.g., wheelchair, bedside chair)? 3  -MS 2  -MA     Climbing 3-5 steps with a railing? 3  -MS 2  -MA     To walk in hospital room? 3  -MS 3  -MA     AM-PAC 6 Clicks Score 20  -MS 17  -MA        Functional Assessment    Outcome Measure Options AM-PAC 6 Clicks Basic  Mobility (PT)  -MS AM-PAC 6 Clicks Basic Mobility (PT)  -MA       User Key  (r) = Recorded By, (t) = Taken By, (c) = Cosigned By    Initials Name Provider Type    MS Gian Allen, PT Physical Therapist    VLADISLAV Harris, PT Physical Therapist           Time Calculation:         PT Charges     Row Name 04/16/18 1036             Time Calculation    Start Time 1010  -MS      Stop Time 1030  -MS      Time Calculation (min) 20 min  -MS      PT Received On 04/16/18  -MS      PT - Next Appointment 04/17/18  -MS        User Key  (r) = Recorded By, (t) = Taken By, (c) = Cosigned By    Initials Name Provider Type    MS Gian Allen, PT Physical Therapist          Therapy Charges for Today     Code Description Service Date Service Provider Modifiers Qty    55531016518 HC PT THER PROC EA 15 MIN 4/16/2018 Gian Allen, PT GP 1          PT G-Codes  Outcome Measure Options: AM-PAC 6 Clicks Basic Mobility (PT)    Gian Allen, PT  4/16/2018

## 2018-04-16 NOTE — PLAN OF CARE
Problem: Patient Care Overview  Goal: Plan of Care Review  Outcome: Ongoing (interventions implemented as appropriate)   04/15/18 0654 04/15/18 2055 04/16/18 0306   Coping/Psychosocial   Plan of Care Reviewed With --  patient --    Plan of Care Review   Progress improving --  --    OTHER   Outcome Summary --  --  Pt. A&Ox4 throught shift. Pt. up with 1 assist with a walker to bathroom multiple times during shift. Pt. appears to be in better spirits. Wound care provided to toe. Pt. hopeful for possible d/c in am.      Goal: Individualization and Mutuality  Outcome: Ongoing (interventions implemented as appropriate)    Goal: Discharge Needs Assessment  Outcome: Ongoing (interventions implemented as appropriate)      Problem: Skin Injury Risk (Adult)  Goal: Skin Health and Integrity  Outcome: Ongoing (interventions implemented as appropriate)      Problem: Fall Risk (Adult)  Goal: Absence of Fall  Outcome: Ongoing (interventions implemented as appropriate)      Problem: Sepsis/Septic Shock (Adult)  Goal: Signs and Symptoms of Listed Potential Problems Will be Absent, Minimized or Managed (Sepsis/Septic Shock)  Outcome: Ongoing (interventions implemented as appropriate)      Problem: Pain, Acute (Adult)  Goal: Acceptable Pain Control/Comfort Level  Outcome: Ongoing (interventions implemented as appropriate)

## 2018-04-16 NOTE — PLAN OF CARE
Problem: Patient Care Overview  Goal: Plan of Care Review  Outcome: Ongoing (interventions implemented as appropriate)   04/16/18 1223   Coping/Psychosocial   Plan of Care Reviewed With patient   OTHER   Outcome Summary Pt presents SBA for ambulation to BR, toileting, grooming. Pt with improving but still impaired B UE AROM. Pt may benefit from skilled OT to increase functional AROM UE and safety/independence with ADL.

## 2018-04-16 NOTE — PROGRESS NOTES
"  ENDOCRINE    Subjective   Elisabet Berry is a 62 y.o. female.     Follow-up diabetes and related disorders.    Received last dose of Solu-Medrol 250 mg this morning.  Patient will be switched to Medrol Dosepak.  Fasting glucose 316.  Random glucose 233.  Increase Novolin N to 25 units twice a day and Novolin R to 15 units before breakfast and 15 units before supper.  Continue NovoLog sliding scale.    Patient has known hypothyroidism and has been on Synthroid 137 µg per day.  Check thyroid function tests and adjust dose if needed.    Patient has hyperlipidemia but is not on medication.  Check lipid profile in the morning.    Afebrile.  On oral Levaquin per ID.    Objective   /67 (BP Location: Right arm, Patient Position: Lying)   Pulse 80   Temp 97.8 °F (36.6 °C) (Oral)   Resp 18   Ht 170.2 cm (67.01\")   Wt 107 kg (236 lb 12.4 oz)   SpO2 97%   BMI 37.08 kg/m²   Physical Exam    Awake, alert, not in distress.  No rales or wheezes.  Regular heart rate and rhythm.  Abdomen soft, nontender.  Mild edema on the left foot with minimal swelling.  Left third toe ulcer without erythema or drainage.  No cyanosis or clubbing.    Lab Results (last 24 hours)     Procedure Component Value Units Date/Time    POC Glucose Once [[id number]]  (Abnormal) Collected:  04/16/18 1653    Specimen:  Blood Updated:  04/16/18 1655     Glucose 361 (H) mg/dL     Narrative:       Meter: [id number] : [id number] [place] NA    POC Glucose Once [[id number]]  (Abnormal) Collected:  04/16/18 1125    Specimen:  Blood Updated:  04/16/18 1127     Glucose 233 (H) mg/dL     Narrative:       Meter: [id number] : [id number] A [place] F[place] NA    POC Glucose Once [[id number]]  (Abnormal) Collected:  04/16/18 0714    Specimen:  Blood Updated:  04/16/18 0715     Glucose 316 (H) mg/dL     Narrative:       Meter: [id number] : [id number] A [place] NA    POC Glucose Once [[id number]]  (Abnormal) Collected:  04/15/18 2123    " Specimen:  Blood Updated:  04/15/18 2124     Glucose 344 (H) mg/dL     Narrative:       Meter: [id number] : [id number] Vincent ZAMORA            Active Problems:    Generalized weakness    Poorly controlled type 2 diabetes mellitus    Other hyperlipidemia    Primary hypothyroidism    Streptococcal sepsis    Cellulitis    Skin ulcer of toe of left foot, limited to breakdown of skin    Insulin therapy as discussed above.  Check thyroid function tests and adjust dose if needed.  Check lipid profile and urine microalbumin.  Continue Levaquin per ID.

## 2018-04-17 VITALS
BODY MASS INDEX: 37.16 KG/M2 | TEMPERATURE: 97.7 F | WEIGHT: 236.77 LBS | RESPIRATION RATE: 16 BRPM | SYSTOLIC BLOOD PRESSURE: 133 MMHG | HEART RATE: 62 BPM | DIASTOLIC BLOOD PRESSURE: 73 MMHG | HEIGHT: 67 IN | OXYGEN SATURATION: 97 %

## 2018-04-17 LAB
ANION GAP SERPL CALCULATED.3IONS-SCNC: 10.6 MMOL/L
BUN BLD-MCNC: 35 MG/DL (ref 8–23)
BUN/CREAT SERPL: 45.5 (ref 7–25)
CALCIUM SPEC-SCNC: 9.2 MG/DL (ref 8.6–10.5)
CHLORIDE SERPL-SCNC: 99 MMOL/L (ref 98–107)
CHOLEST SERPL-MCNC: 189 MG/DL (ref 0–200)
CO2 SERPL-SCNC: 27.4 MMOL/L (ref 22–29)
CREAT BLD-MCNC: 0.77 MG/DL (ref 0.57–1)
DEPRECATED RDW RBC AUTO: 48 FL (ref 37–54)
ERYTHROCYTE [DISTWIDTH] IN BLOOD BY AUTOMATED COUNT: 13.4 % (ref 11.7–13)
GFR SERPL CREATININE-BSD FRML MDRD: 76 ML/MIN/1.73
GLUCOSE BLD-MCNC: 261 MG/DL (ref 65–99)
GLUCOSE BLDC GLUCOMTR-MCNC: 194 MG/DL (ref 70–130)
GLUCOSE BLDC GLUCOMTR-MCNC: 244 MG/DL (ref 70–130)
GLUCOSE BLDC GLUCOMTR-MCNC: 298 MG/DL (ref 70–130)
HCT VFR BLD AUTO: 35.9 % (ref 35.6–45.5)
HDLC SERPL-MCNC: 19 MG/DL (ref 40–60)
HGB BLD-MCNC: 11.5 G/DL (ref 11.9–15.5)
LDLC SERPL CALC-MCNC: 139 MG/DL (ref 0–100)
LDLC/HDLC SERPL: 7.31 {RATIO}
MCH RBC QN AUTO: 31.6 PG (ref 26.9–32)
MCHC RBC AUTO-ENTMCNC: 32 G/DL (ref 32.4–36.3)
MCV RBC AUTO: 98.6 FL (ref 80.5–98.2)
PLATELET # BLD AUTO: 277 10*3/MM3 (ref 140–500)
PMV BLD AUTO: 10.2 FL (ref 6–12)
POTASSIUM BLD-SCNC: 3.9 MMOL/L (ref 3.5–5.2)
RBC # BLD AUTO: 3.64 10*6/MM3 (ref 3.9–5.2)
SODIUM BLD-SCNC: 137 MMOL/L (ref 136–145)
TRIGL SERPL-MCNC: 156 MG/DL (ref 0–150)
VLDLC SERPL-MCNC: 31.2 MG/DL (ref 5–40)
WBC NRBC COR # BLD: 8.39 10*3/MM3 (ref 4.5–10.7)

## 2018-04-17 PROCEDURE — 82962 GLUCOSE BLOOD TEST: CPT

## 2018-04-17 PROCEDURE — 80048 BASIC METABOLIC PNL TOTAL CA: CPT | Performed by: INTERNAL MEDICINE

## 2018-04-17 PROCEDURE — 80061 LIPID PANEL: CPT | Performed by: INTERNAL MEDICINE

## 2018-04-17 PROCEDURE — 63710000001 INSULIN ASPART PER 5 UNITS: Performed by: INTERNAL MEDICINE

## 2018-04-17 PROCEDURE — 99232 SBSQ HOSP IP/OBS MODERATE 35: CPT | Performed by: INTERNAL MEDICINE

## 2018-04-17 PROCEDURE — 63710000001 INSULIN ISOPHANE HUMAN PER 5 UNITS: Performed by: INTERNAL MEDICINE

## 2018-04-17 PROCEDURE — 85027 COMPLETE CBC AUTOMATED: CPT | Performed by: INTERNAL MEDICINE

## 2018-04-17 PROCEDURE — 97535 SELF CARE MNGMENT TRAINING: CPT | Performed by: OCCUPATIONAL THERAPIST

## 2018-04-17 PROCEDURE — 97110 THERAPEUTIC EXERCISES: CPT

## 2018-04-17 RX ORDER — LEVOTHYROXINE SODIUM 137 UG/1
137 TABLET ORAL DAILY
Qty: 30 TABLET | Refills: 1 | Status: SHIPPED | OUTPATIENT
Start: 2018-04-17 | End: 2018-04-17

## 2018-04-17 RX ORDER — TRAZODONE HYDROCHLORIDE 50 MG/1
50 TABLET ORAL NIGHTLY
Qty: 30 TABLET | Refills: 0 | Status: SHIPPED | OUTPATIENT
Start: 2018-04-17 | End: 2018-04-17

## 2018-04-17 RX ORDER — LEVOFLOXACIN 750 MG/1
750 TABLET ORAL EVERY 24 HOURS
Qty: 11 TABLET | Refills: 0 | Status: SHIPPED | OUTPATIENT
Start: 2018-04-18 | End: 2018-04-29

## 2018-04-17 RX ORDER — TRAZODONE HYDROCHLORIDE 50 MG/1
50 TABLET ORAL NIGHTLY
Qty: 30 TABLET | Refills: 0 | Status: SHIPPED | OUTPATIENT
Start: 2018-04-17 | End: 2018-05-17

## 2018-04-17 RX ORDER — LEVOFLOXACIN 750 MG/1
750 TABLET ORAL EVERY 24 HOURS
Qty: 11 TABLET | Refills: 0 | Status: SHIPPED | OUTPATIENT
Start: 2018-04-18 | End: 2018-04-17

## 2018-04-17 RX ORDER — LEVOTHYROXINE SODIUM 137 UG/1
137 TABLET ORAL DAILY
Qty: 30 TABLET | Refills: 1 | Status: SHIPPED | OUTPATIENT
Start: 2018-04-17 | End: 2018-05-17

## 2018-04-17 RX ADMIN — INSULIN ASPART 2 UNITS: 100 INJECTION, SOLUTION INTRAVENOUS; SUBCUTANEOUS at 17:20

## 2018-04-17 RX ADMIN — ALLOPURINOL 300 MG: 300 TABLET ORAL at 09:48

## 2018-04-17 RX ADMIN — INSULIN ASPART 2 UNITS: 100 INJECTION, SOLUTION INTRAVENOUS; SUBCUTANEOUS at 09:48

## 2018-04-17 RX ADMIN — INSULIN ASPART 4 UNITS: 100 INJECTION, SOLUTION INTRAVENOUS; SUBCUTANEOUS at 12:27

## 2018-04-17 RX ADMIN — GABAPENTIN 600 MG: 300 CAPSULE ORAL at 07:19

## 2018-04-17 RX ADMIN — HUMAN INSULIN 15 UNITS: 100 INJECTION, SOLUTION SUBCUTANEOUS at 17:21

## 2018-04-17 RX ADMIN — HUMAN INSULIN 15 UNITS: 100 INJECTION, SOLUTION SUBCUTANEOUS at 09:48

## 2018-04-17 RX ADMIN — HUMAN INSULIN 25 UNITS: 100 INJECTION, SUSPENSION SUBCUTANEOUS at 07:19

## 2018-04-17 RX ADMIN — GABAPENTIN 600 MG: 300 CAPSULE ORAL at 15:50

## 2018-04-17 RX ADMIN — LEVOTHYROXINE SODIUM 137 MCG: 137 TABLET ORAL at 07:19

## 2018-04-17 RX ADMIN — LEVOFLOXACIN 750 MG: 750 TABLET, FILM COATED ORAL at 12:27

## 2018-04-17 NOTE — PROGRESS NOTES
[facility]  Phone: [phone]  Bob Olivier MD    Subjective:  LOS: 9    She continues to feel stronger.  She can lift her right arm quite easily now.  Left arm is limited mobility with able to lift off the bed.  Denies any other complaints.    Objective   Vital Signs past 24hrs    Temp range: Temp (24hrs), Av.6 °F (36.4 °C), Min:97.4 °F (36.3 °C), Max:97.8 °F (36.6 °C)    BP range: BP: (115-141)/(57-86) 115/67  Pulse range: Heart Rate:  [49-80] 80  Resp rate range: Resp:  [15-18] 18    Device (Oxygen Therapy): room air   Oxygen range:SpO2:  [90 %-97 %] 97 %      107 kg (236 lb 12.4 oz); Body mass index is 37.08 kg/m².  No intake or output data in the 24 hours ending 18    Physical Exam   Cardiovascular: Normal rate and regular rhythm.    No murmur heard.  Pulmonary/Chest: Effort normal. No respiratory distress. She has no wheezes. She has no rales.   Abdominal: Soft. Bowel sounds are normal. She exhibits no mass. There is no tenderness.   Musculoskeletal: She exhibits edema (2+ on left).   Neurological:   Similar weakness in LUE (proximal)   Skin: No rash noted.     Results Review:    I have reviewed the laboratory and imaging data since the last note by [facility] physician.  My annotations are noted in assessment and plan.    Mri Shoulder Left Without Contrast    Result Date: 2018  Fine anatomic detail on this exam is limited by motion and one of the usually included sequences was not run as the patient was unable to tolerate the full exam. There is clearly advanced degenerative change at the glenohumeral joint. There appears to have been previous left shoulder surgery, possibly supraspinatus tendon repair. A small partial-thickness or full-thickness recurrent tear at the supraspinatus might be obscured on this exam. There is no evidence of any complete recurrent rotator cuff tear. No soft tissue edema, joint, or bursal effusion is present to suggest active inflammation or infection.   This report was finalized on 4/16/2018 1:29 PM by Dr. Nagi Avalos MD.               Medication Review:  I have reviewed the current MAR.  My annotations are noted in assessment and plan.      sodium chloride 9 mL/hr Last Rate: 9 mL/hr (04/09/18 0821)     Plan   PCCM Problems  Sepsis, left foot cellulitis  Bilateral UE weakness progressive, cervical strain  Bilateral shoulder pain L>R - partial thickness tear at supraspinatus tear, DJD  Recent traumatic fall  Diabetic left foot ulcer  DM, uncontrolled  Syncope, -ve anni    Plan of Treatment  Offered patient ortho opinion, declines.    Discussed with Dr. Watson - ok to stop medrol 'cold turkey'.    Appreciate endo help. Plan dc on regimen per them tomorrow.    Bob Olivier MD  04/16/18  8:03 PM    Part of this note may be an electronic transcription/translation of spoken language to printed text using the Dragon Dictation System.

## 2018-04-17 NOTE — PLAN OF CARE
Problem: Patient Care Overview  Goal: Plan of Care Review  Outcome: Ongoing (interventions implemented as appropriate)   04/15/18 0654 04/16/18 2216 04/17/18 0323   Coping/Psychosocial   Plan of Care Reviewed With --  patient --    Plan of Care Review   Progress improving --  --    OTHER   Outcome Summary --  --  Pt remains A&Ox4 throughout shift. Pain controlled well with tylenol. PO steroids d/c'd by Dr. Olivier at beginning of shift. Third toe on left foot is healing, swelling drecreased with elevation. Plan for possible d/c in am.     Goal: Individualization and Mutuality  Outcome: Ongoing (interventions implemented as appropriate)    Goal: Discharge Needs Assessment  Outcome: Ongoing (interventions implemented as appropriate)      Problem: Skin Injury Risk (Adult)  Goal: Skin Health and Integrity  Outcome: Ongoing (interventions implemented as appropriate)      Problem: Fall Risk (Adult)  Goal: Absence of Fall  Outcome: Ongoing (interventions implemented as appropriate)      Problem: Sepsis/Septic Shock (Adult)  Goal: Signs and Symptoms of Listed Potential Problems Will be Absent, Minimized or Managed (Sepsis/Septic Shock)  Outcome: Ongoing (interventions implemented as appropriate)      Problem: Pain, Acute (Adult)  Goal: Acceptable Pain Control/Comfort Level  Outcome: Ongoing (interventions implemented as appropriate)

## 2018-04-17 NOTE — PLAN OF CARE
Problem: Patient Care Overview  Goal: Plan of Care Review  Outcome: Ongoing (interventions implemented as appropriate)   04/16/18 2001   Coping/Psychosocial   Plan of Care Reviewed With patient;daughter   OTHER   Outcome Summary Doing well, possible d/c in AM. Pain is controlled with tylenol. Third toe on left foot is healing, swelling id decreased. Will continue to monitor.        Problem: Skin Injury Risk (Adult)  Goal: Skin Health and Integrity  Outcome: Ongoing (interventions implemented as appropriate)      Problem: Fall Risk (Adult)  Goal: Absence of Fall  Outcome: Ongoing (interventions implemented as appropriate)

## 2018-04-17 NOTE — PROGRESS NOTES
Case Management Discharge Note    Final Note: Home    Destination     No service coordination in this encounter.      Durable Medical Equipment     No service coordination in this encounter.      Dialysis/Infusion     No service coordination in this encounter.      Home Medical Care     No service coordination in this encounter.      Social Care     No service coordination in this encounter.        Other: Other    Final Discharge Disposition Code: 01 - home or self-care       Continued Stay Note  [facility]     Patient Name: Elisabet Berry  MRN: [id number]  Today's Date: 4/17/2018    Admit Date: 4/7/2018          Discharge Plan     Row Name 04/17/18 1656       Plan    Plan Home to daughter house    Patient/Family in Agreement with Plan yes    Plan Comments Met w/ patient in room.  Introduced self and explained reason for visit.  Daughter picking up rx at [facility] (both insulins at $24.88 each, and levaquin).  Patient will be staying with her daughter here in [place] for 2 weeks.  Patient states she has all her regular medications with her.      Final Discharge Disposition Code 01 - home or self-care    Final Note Home              Discharge Codes    No documentation.       Expected Discharge Date and Time     Expected Discharge Date Expected Discharge Time    Apr 17, 2018             Jimmy Sanchez RN

## 2018-04-17 NOTE — PLAN OF CARE
Problem: Patient Care Overview  Goal: Plan of Care Review  Outcome: Ongoing (interventions implemented as appropriate)   04/17/18 0915   Coping/Psychosocial   Plan of Care Reviewed With patient   Plan of Care Review   Progress improving   OTHER   Outcome Summary Pt increased with amb distance and safety with RWX and improved transfer I and safety

## 2018-04-17 NOTE — THERAPY TREATMENT NOTE
Acute Care - Physical Therapy Treatment Note  [facility]     Patient Name: Elisabet Berry  : 1955  MRN: [id number]  Today's Date: 2018  Onset of Illness/Injury or Date of Surgery: 18  Date of Referral to PT: 18  Referring Physician: Dr Russo    Admit Date: 2018    Visit Dx:    ICD-10-CM ICD-9-CM   1. Generalized weakness R53.1 780.79   2. Hyperglycemia R73.9 790.29   3. Dehydration E86.0 276.51   4. Altered mental status, unspecified altered mental status type R41.82 780.97   5. Sepsis, due to unspecified organism A41.9 038.9     995.91     Patient Active Problem List   Diagnosis   • Generalized weakness   • Poorly controlled type 2 diabetes mellitus   • Other hyperlipidemia   • Primary hypothyroidism   • Streptococcal sepsis   • Cellulitis   • Skin ulcer of toe of left foot, limited to breakdown of skin       Therapy Treatment          Rehabilitation Treatment Summary     Row Name 18 09             Treatment Time/Intention    Discipline physical therapy assistant  -CW      Document Type therapy note (daily note)  -CW      Subjective Information complains of;pain;fatigue  -CW      Mode of Treatment physical therapy  -CW      Therapy Frequency (PT Clinical Impression) 5 times/wk  -CW      Patient Effort good  -CW      Existing Precautions/Restrictions fall  -CW      Recorded by [CW] Mahesh Galeas PTA 18 0915 18      Row Name 18 09             Vital Signs    O2 Delivery Pre Treatment room air  -CW      Recorded by [CW] Mahesh Galeas PTA 1815 18      Row Name 18 09             Cognitive Assessment/Intervention- PT/OT    Orientation Status (Cognition) oriented x 4  -CW      Follows Commands (Cognition) WNL  -CW      Personal Safety Interventions fall prevention program maintained;gait belt;muscle strengthening facilitated;nonskid shoes/slippers when out of bed  -CW      Recorded by [CW] Mahesh Galeas PTA  04/17/18 0915 04/17/18 0915      Row Name 04/17/18 0900             Mobility Assessment/Intervention    Extremity Weight-bearing Status left lower extremity  -CW      Left Lower Extremity (Weight-bearing Status) weight-bearing as tolerated (WBAT)  -CW      Recorded by [CW] Mahesh Galeas, PTA 04/17/18 0915 04/17/18 0915      Row Name 04/17/18 0900             Bed Mobility Assessment/Treatment    Bed Mobility Assessment/Treatment supine-sit  -CW      Supine-Sit [place] (Bed Mobility) independent  -CW      Assistive Device (Bed Mobility) bed rails  -CW      Comment (Bed Mobility) up to chair  -CW      Recorded by [CW] Mahesh Galeas, PTA 04/17/18 0915 04/17/18 0915      Row Name 04/17/18 0900             Transfer Assessment/Treatment    Transfer Assessment/Treatment sit-stand transfer;stand-sit transfer  -CW      Sit-Stand [place] (Transfers) supervision  -CW      Stand-Sit [place] (Transfers) supervision  -CW      Recorded by [CW] Mahesh Galeas, PTA 04/17/18 0915 04/17/18 0915      Row Name 04/17/18 0900             Sit-Stand Transfer    Assistive Device (Sit-Stand Transfers) walker, front-wheeled  -CW      Recorded by [CW] Mahesh Galeas, PTA 04/17/18 0915 04/17/18 0915      Row Name 04/17/18 0900             Stand-Sit Transfer    Assistive Device (Stand-Sit Transfers) walker, front-wheeled  -CW      Recorded by [CW] Mahesh Galeas, PTA 04/17/18 0915 04/17/18 0915      Row Name 04/17/18 0900             Gait/Stairs Assessment/Training    [place] Level (Gait) stand by assist  -CW      Assistive Device (Gait) walker, front-wheeled  -CW      Distance in Feet (Gait) 180  -CW      Pattern (Gait) step-through  -CW      Deviations/Abnormal Patterns (Gait) antalgic  -CW      Recorded by [CW] Mahesh Galeas, PTA 04/17/18 0915 04/17/18 0915      Row Name 04/17/18 0900             Positioning and Restraints    Pre-Treatment Position in bed  -CW      Post Treatment Position chair   -CW      In Chair notified nsg;sitting;call light within reach;encouraged to call for assist  -CW      Recorded by [CW] Mahesh Galeas, ZEHRA 04/17/18 0915 04/17/18 0915      Row Name 04/17/18 0900             Pain Assessment    Additional Documentation Pain Scale: Numbers Pre/Post-Treatment (Group)  -CW      Recorded by [CW] Mahesh Galeas, ZEHRA 04/17/18 0915 04/17/18 0915      Row Name 04/17/18 0900             Pain Scale: Numbers Pre/Post-Treatment    Pain Scale: Numbers, Pretreatment 3/10  -CW      Pain Scale: Numbers, Post-Treatment 6/10  -CW      Pain Location - Side Left  -CW      Pain Location - Orientation lower  -CW      Pain Location foot  -CW      Pain Intervention(s) Repositioned;Ambulation/increased activity  -CW      Recorded by [CW] Mahesh Galeas, ZEHRA 04/17/18 0915 04/17/18 0915      Row Name                Wound 04/08/18 0015 Left toe diabetic/neuropathic ulceration    Wound - Properties Group Date first assessed: 04/08/18 [KA] Time first assessed: 0015 [KA] Present On Admission : yes [KA] Side: Left [KA] Location: toe [KA], 3rd  Type: diabetic/neuropathic ulceration [KA] Recorded by:  [KA] Abigail Quan RN 04/08/18 0206 04/08/18 0206    Row Name                Wound 04/08/18 0015 occipital region laceration    Wound - Properties Group Date first assessed: 04/08/18 [KA] Time first assessed: 0015 [KA] Present On Admission : yes [KA] Location: occipital region [KA] Type: laceration [KA] Number of Staples Placed: 1 [KA] Recorded by:  [KA] Abigail Quan RN 04/08/18 0209 04/08/18 0209    Row Name 04/17/18 0900             Outcome Summary/Treatment Plan (PT)    Anticipated Discharge Disposition (PT) home or self care;home with home health care  -CW      Recorded by [CW] Mahesh Galeas PTA 04/17/18 0915 04/17/18 0915        User Key  (r) = Recorded By, (t) = Taken By, (c) = Cosigned By    Initials Name Effective Dates Discipline    JEANNINE Quan RN 01/26/18 -  Nurse    BHARGAV FALK  Gudelia, PTA 03/07/18 -  PT          Wound 04/08/18 0015 Left toe diabetic/neuropathic ulceration (Active)   Dressing Appearance open to air 4/16/2018 10:16 PM   Closure None 4/16/2018 10:16 PM   Base black eschar 4/16/2018 10:16 PM   Periwound edematous;redness 4/16/2018 10:16 PM   Periwound Temperature warm 4/16/2018 10:16 PM   Periwound Skin Turgor soft 4/16/2018 10:16 PM   Edges irregular 4/16/2018 10:16 PM   Drainage Amount none 4/16/2018 10:16 PM   Care, Wound cleansed with 4/16/2018 10:16 PM   Dressing Care, Wound open to air 4/16/2018 10:16 PM       Wound 04/08/18 0015 occipital region laceration (Active)   Dressing Appearance open to air 4/16/2018 10:16 PM   Closure Staples 4/16/2018 10:16 PM   Base closed/resurfaced 4/16/2018 10:16 PM   Periwound ecchymotic;redness 4/16/2018 10:16 PM   Drainage Amount none 4/16/2018 10:16 PM   Dressing Care, Wound open to air 4/16/2018 10:16 PM             Physical Therapy Education     Title: PT OT SLP Therapies (Done)     Topic: Physical Therapy (Done)     Point: Mobility training (Done)    Learning Progress Summary     Learner Status Readiness Method Response Comment Documented by    Patient Done Acceptance E,JESUSITA BOTELLO  CW 04/17/18 0915     Done Acceptance E,D ENEDELIA,NR  MS 04/16/18 1035     Done Acceptance E VU  MA 04/13/18 1455     Done Acceptance E VU  MA 04/12/18 1045     Active Acceptance E NR  MA 04/11/18 1610          Point: Body mechanics (Done)    Learning Progress Summary     Learner Status Readiness Method Response Comment Documented by    Patient Done Acceptance E,TB JESUSITA MCDANIELS  CW 04/17/18 0915     Done Acceptance E,D ENEDELIA,NR  MS 04/16/18 1035     Done Acceptance E VU  MA 04/13/18 1455     Done Acceptance E VU  MA 04/12/18 1045     Active Acceptance E NR  MA 04/11/18 1610          Point: Precautions (Done)    Learning Progress Summary     Learner Status Readiness Method Response Comment Documented by    Patient Done Acceptance E,TB JESUSITA MCDANIELS   04/17/18 0915     Done  Acceptance E,D VU,NR  MS 04/16/18 1035     Done Acceptance E VU  MA 04/13/18 1455     Done Acceptance E VU  MA 04/12/18 1045     Active Acceptance E NR  MA 04/11/18 1610                      User Key     Initials Effective Dates Name Provider Type Discipline    MS 04/03/18 -  Gian Allen, PT Physical Therapist PT    MA 04/03/18 -  Shyla Harris, PT Physical Therapist PT     03/07/18 -  Mahesh Galeas, PTA Physical Therapy Assistant PT                    PT Recommendation and Plan  Anticipated Discharge Disposition (PT): home or self care, home with home health care  Therapy Frequency (PT Clinical Impression): 5 times/wk  Outcome Summary/Treatment Plan (PT)  Anticipated Discharge Disposition (PT): home or self care, home with home health care  Plan of Care Reviewed With: patient  Progress: improving  Outcome Summary: Pt increased with amb distance and safety with RWX and improved transfer I and safety          Outcome Measures     Row Name 04/17/18 0900 04/16/18 1224 04/16/18 1200       How much help from another person do you currently need...    Turning from your back to your side while in flat bed without using bedrails? 4  -CW  --  --    Moving from lying on back to sitting on the side of a flat bed without bedrails? 4  -CW  --  --    Moving to and from a bed to a chair (including a wheelchair)? 3  -CW  --  --    Standing up from a chair using your arms (e.g., wheelchair, bedside chair)? 3  -CW  --  --    Climbing 3-5 steps with a railing? 3  -CW  --  --    To walk in hospital room? 3  -CW  --  --    AM-PAC 6 Clicks Score 20  -CW  --  --       How much help from another is currently needed...    Putting on and taking off regular lower body clothing?  -- 3  -LE  --    Bathing (including washing, rinsing, and drying)  -- 3  -LE  --    Toileting (which includes using toilet bed pan or urinal)  -- 4  -LE  --    Putting on and taking off regular upper body clothing  -- 3  -LE  --    Taking care of  personal grooming (such as brushing teeth)  -- 3  -LE  --    Eating meals  -- 4  -LE  --    Score  -- 20  -LE  --       Functional Assessment    Outcome Measure Options AM-PAC 6 Clicks Basic Mobility (PT)  -CW  -- AM-PAC 6 Clicks Daily Activity (OT)  -LE    Row Name 04/16/18 1000             How much help from another person do you currently need...    Turning from your back to your side while in flat bed without using bedrails? 4  -MS      Moving from lying on back to sitting on the side of a flat bed without bedrails? 4  -MS      Moving to and from a bed to a chair (including a wheelchair)? 3  -MS      Standing up from a chair using your arms (e.g., wheelchair, bedside chair)? 3  -MS      Climbing 3-5 steps with a railing? 3  -MS      To walk in hospital room? 3  -MS      AM-PAC 6 Clicks Score 20  -MS         Functional Assessment    Outcome Measure Options AM-PAC 6 Clicks Basic Mobility (PT)  -MS        User Key  (r) = Recorded By, (t) = Taken By, (c) = Cosigned By    Initials Name Provider Type    JANIA Esparza, OTR Occupational Therapist    MS Gian JEREMIAH Allen, PT Physical Therapist    BHARGAV Galeas PTA Physical Therapy Assistant           Time Calculation:         PT Charges     Row Name 04/17/18 0916             Time Calculation    Start Time 0848  -CW      Stop Time 0916  -CW      Time Calculation (min) 28 min  -CW      PT Received On 04/17/18  -CW      PT - Next Appointment 04/18/18  -CW        User Key  (r) = Recorded By, (t) = Taken By, (c) = Cosigned By    Initials Name Provider Type    BHARGAV Galeas PTA Physical Therapy Assistant          Therapy Charges for Today     Code Description Service Date Service Provider Modifiers Qty    18905961027 HC PT THER PROC EA 15 MIN 4/17/2018 Mahesh Galeas PTA GP 2          PT G-Codes  Outcome Measure Options: AM-[facility] 6 Clicks Basic Mobility (PT)    Mahesh Galeas PTA  4/17/2018

## 2018-04-17 NOTE — PROGRESS NOTES
"  ENDOCRINE    Subjective   AND PLANS  Elisabet Berry is a 62 y.o. female.     Follow-up diabetes and related disorders.    Corticosteroids were discontinued last night and blood sugars are slowly coming down.  Fasting glucose 294.  Random glucose 261-339.  Continue Novolin N 25 units twice a day and Novolin R 15 units before breakfast and 15 units before supper.  If discharged, follow-up with primary care physician in one week.  If needed, follow-up with Dr. Chin.      Free T4 normal at 1.52 ng per DL.  TSH is normal at 1.57.  Continue Synthroid 137 µg per day.    LDL elevated at 139.  HDL low at 19.  Patient was on unknown statin in the past and had an unknown side effect.  Patient was advised to see PCP for reevaluation.  Have discussed with patient the benefits of statin therapy    Afebrile.  On oral Levaquin.      Objective   /63 (BP Location: Right arm, Patient Position: Lying)   Pulse 63   Temp 97.7 °F (36.5 °C) (Oral)   Resp 18   Ht 170.2 cm (67.01\")   Wt 107 kg (236 lb 12.4 oz)   SpO2 96%   BMI 37.08 kg/m²   Physical Exam    Awake, alert, not in distress.     no rales or wheezes.  Regular heart rate and rhythm.  Abdomen soft, nontender.  Mild left foot edema.  No cyanosis or clubbing.    Lab Results (last 24 hours)     Procedure Component Value Units Date/Time    Basic Metabolic Panel [[id number]]  (Abnormal) Collected:  04/17/18 0618    Specimen:  Blood Updated:  04/17/18 0757     Glucose 261 (H) mg/dL      BUN 35 (H) mg/dL      Creatinine 0.77 mg/dL      Sodium 137 mmol/L      Potassium 3.9 mmol/L      Chloride 99 mmol/L      CO2 27.4 mmol/L      Calcium 9.2 mg/dL      eGFR Non African Amer 76 mL/min/1.73      BUN/Creatinine Ratio 45.5 (H)     Anion Gap 10.6 mmol/L     Narrative:       GFR Normal >60  Chronic Kidney Disease <60  Kidney Failure <15    Lipid Panel [[id number]]  (Abnormal) Collected:  04/17/18 0618    Specimen:  Blood Updated:  04/17/18 0754     Total Cholesterol 189 mg/dL "      Triglycerides 156 (H) mg/dL      HDL Cholesterol 19 (L) mg/dL      LDL Cholesterol  139 (H) mg/dL      VLDL Cholesterol 31.2 mg/dL      LDL/HDL Ratio 7.31    Narrative:       Cholesterol Reference Ranges  (U.S. Department of Health and Human Services ATP III Classifications)    Desirable          <200 mg/dL  Borderline High    200-239 mg/dL  High Risk          >240 mg/dL      Triglyceride Reference Ranges  (U.S. Department of Health and Human Services ATP III Classifications)    Normal           <150 mg/dL  Borderline High  150-199 mg/dL  High             200-499 mg/dL  Very High        >500 mg/dL    HDL Reference Ranges  (U.S. Department of Health and Human Services ATP III Classifcations)    Low     <40 mg/dl (major risk factor for CHD)  High    >60 mg/dl ('negative' risk factor for CHD)        LDL Reference Ranges  (U.S. Department of Health and Human Services ATP III Classifcations)    Optimal          <100 mg/dL  Near Optimal     100-129 mg/dL  Borderline High  130-159 mg/dL  High             160-189 mg/dL  Very High        >189 mg/dL    POC Glucose Once [[id number]]  (Abnormal) Collected:  04/17/18 0738    Specimen:  Blood Updated:  04/17/18 0740     Glucose 244 (H) mg/dL     Narrative:       Meter: [id number] : [id number] Supa Chemadriana NA    CBC (No Diff) [[id number]]  (Abnormal) Collected:  04/17/18 0618    Specimen:  Blood Updated:  04/17/18 0729     WBC 8.39 10*3/mm3      RBC 3.64 (L) 10*6/mm3      Hemoglobin 11.5 (L) g/dL      Hematocrit 35.9 %      MCV 98.6 (H) fL      MCH 31.6 pg      MCHC 32.0 (L) g/dL      RDW 13.4 (H) %      RDW-SD 48.0 fl      MPV 10.2 fL      Platelets 277 10*3/mm3     POC Glucose Once [[id number]]  (Abnormal) Collected:  04/16/18 2145    Specimen:  Blood Updated:  04/16/18 2146     Glucose 339 (H) mg/dL     Narrative:       Meter: [id number] : [id number] Brandon Guevara NA    T4, Free [[id number]]  (Normal) Collected:  04/15/18 0509    Specimen:  Blood Updated:  04/16/18  1847     Free T4 1.52 ng/dL     TSH [[id number]]  (Normal) Collected:  04/15/18 0509    Specimen:  Blood Updated:  04/16/18 1847     TSH 1.570 mIU/mL     POC Glucose Once [[id number]]  (Abnormal) Collected:  04/16/18 1653    Specimen:  Blood Updated:  04/16/18 1655     Glucose 361 (H) mg/dL     Narrative:       Meter: [id number] : [id number] Jackie ZAMORA    POC Glucose Once [[id number]]  (Abnormal) Collected:  04/16/18 1125    Specimen:  Blood Updated:  04/16/18 1127     Glucose 233 (H) mg/dL     Narrative:       Meter: [id number] : [id number] Jackie ZAMORA            Active Problems:    Generalized weakness    Poorly controlled type 2 diabetes mellitus    Other hyperlipidemia    Primary hypothyroidism    Streptococcal sepsis    Cellulitis    Skin ulcer of toe of left foot, limited to breakdown of skin    Diabetes therapy as discussed above.  Continue levothyroxine.  Advised to discuss with PCP about using statin.

## 2018-04-17 NOTE — PLAN OF CARE
Problem: Patient Care Overview  Goal: Plan of Care Review   04/17/18 1221   Coping/Psychosocial   Plan of Care Reviewed With patient   Plan of Care Review   Progress improving   OTHER   Outcome Summary Improving independence with ADLs, CGA/SBA for shower transfer using rwx and shower chair. Demo slight LOB when standing.

## 2018-04-17 NOTE — THERAPY TREATMENT NOTE
Acute Care - Occupational Therapy Treatment Note  [facility]     Patient Name: Elisabet Berry  : 1955  MRN: [id number]  Today's Date: 2018  Onset of Illness/Injury or Date of Surgery: 18  Date of Referral to OT: 18  Referring Physician: Dr Russo    Admit Date: 2018       ICD-10-CM ICD-9-CM   1. Generalized weakness R53.1 780.79   2. Hyperglycemia R73.9 790.29   3. Dehydration E86.0 276.51   4. Altered mental status, unspecified altered mental status type R41.82 780.97   5. Sepsis, due to unspecified organism A41.9 038.9     995.91     Patient Active Problem List   Diagnosis   • Generalized weakness   • Poorly controlled type 2 diabetes mellitus   • Other hyperlipidemia   • Primary hypothyroidism   • Streptococcal sepsis   • Cellulitis   • Skin ulcer of toe of left foot, limited to breakdown of skin     Past Medical History:   Diagnosis Date   • Arthritis    • Diabetes mellitus    • Gout    • Peripheral neuropathy      No past surgical history on file.    Therapy Treatment          Rehabilitation Treatment Summary     Row Name 18 1219 18 09          Treatment Time/Intention    Discipline occupational therapist  -SO physical therapy assistant  -CW     Document Type therapy note (daily note)  -SO therapy note (daily note)  -CW     Subjective Information no complaints  -SO complains of;pain;fatigue  -CW     Mode of Treatment occupational therapy  -SO physical therapy  -CW     Therapy Frequency (PT Clinical Impression)  -- 5 times/wk  -CW     Patient Effort good  -SO good  -CW     Existing Precautions/Restrictions fall  -SO fall  -CW     Recorded by [SO] Renea Be, OTR 18 1221 18 1221 [CW] Mahesh Galeas, PTA 18 0915 18 0915     Row Name 18 09             Vital Signs    O2 Delivery Pre Treatment room air  -CW      Recorded by [CW] Mahesh Galeas, ZEHRA 18 0915 18 0915      Row Name 18 1219 18 09           Cognitive Assessment/Intervention- PT/OT    Orientation Status (Cognition) oriented x 4  -SO oriented x 4  -CW     Follows Commands (Cognition) WNL  -SO WNL  -CW     Personal Safety Interventions  -- fall prevention program maintained;gait belt;muscle strengthening facilitated;nonskid shoes/slippers when out of bed  -CW     Recorded by [SO] Renea Be, OTR 04/17/18 1221 04/17/18 1221 [CW] Mahesh Galeas, PTA 04/17/18 0915 04/17/18 0915     Row Name 04/17/18 0900             Mobility Assessment/Intervention    Extremity Weight-bearing Status left lower extremity  -CW      Left Lower Extremity (Weight-bearing Status) weight-bearing as tolerated (WBAT)  -CW      Recorded by [CW] Mahesh Galeas, PTA 04/17/18 0915 04/17/18 0915      Row Name 04/17/18 1219 04/17/18 0900          Bed Mobility Assessment/Treatment    Bed Mobility Assessment/Treatment  -- supine-sit  -CW     Supine-Sit [place] (Bed Mobility) not tested  -SO independent  -CW     Sit-Supine [place] (Bed Mobility) not tested  -SO  --     Assistive Device (Bed Mobility)  -- bed rails  -CW     Comment (Bed Mobility)  -- up to chair  -CW     Recorded by [SO] Renea Be, OTR 04/17/18 1221 04/17/18 1221 [CW] Mahesh Galeas, PTA 04/17/18 0915 04/17/18 0915     Row Name 04/17/18 1219 04/17/18 0900          Transfer Assessment/Treatment    Transfer Assessment/Treatment shower transfer  -SO sit-stand transfer;stand-sit transfer  -CW     Sit-Stand [place] (Transfers) supervision  -SO supervision  -CW     Stand-Sit [place] (Transfers) supervision  -SO supervision  -CW     [place] Level (Shower Transfer) supervision;contact guard  -SO  --     Assistive Device (Shower Transfer) shower chair  -SO  --     Recorded by [SO] Renea Be, OTR 04/17/18 1221 04/17/18 1221 [CW] Mahesh Galeas, PTA 04/17/18 0915 04/17/18 0915     Row Name 04/17/18 1219 04/17/18 0900          Sit-Stand Transfer    Assistive  Device (Sit-Stand Transfers) walker, front-wheeled  -SO walker, front-wheeled  -CW     Recorded by [SO] Renea Be, OTR 04/17/18 1221 04/17/18 1221 [CW] Mahesh Galeas, PTA 04/17/18 0915 04/17/18 0915     Row Name 04/17/18 1219 04/17/18 0900          Stand-Sit Transfer    Assistive Device (Stand-Sit Transfers) walker, front-wheeled  -SO walker, front-wheeled  -CW     Recorded by [SO] Renea Be, OTR 04/17/18 1221 04/17/18 1221 [CW] Mahesh Galeas, PTA 04/17/18 0915 04/17/18 0915     Row Name 04/17/18 1219             Shower Transfer    Type (Shower Transfer) sit-stand;stand-sit  -SO      Recorded by [SO] Renea Be, OTR 04/17/18 1221 04/17/18 1221      Row Name 04/17/18 0900             Gait/Stairs Assessment/Training    [place] Level (Gait) stand by assist  -CW      Assistive Device (Gait) walker, front-wheeled  -CW      Distance in Feet (Gait) 180  -CW      Pattern (Gait) step-through  -CW      Deviations/Abnormal Patterns (Gait) antalgic  -CW      Recorded by [CW] Mahesh Galeas, PTA 04/17/18 0915 04/17/18 0915      Row Name 04/17/18 1219             ADL Assessment/Intervention    BADL Assessment/Intervention bathing;upper body dressing;lower body dressing;grooming  -SO      Recorded by [SO] Renea Be, OTR 04/17/18 1221 04/17/18 1221      Row Name 04/17/18 1219             Bathing Assessment/Intervention    Bathing [place] lower body;upper body;contact guard assist  -SO      Assistive Devices (Bathing) grab bars/tub rail;hand-held shower spray hose;shower chair  -SO      Bathing Position supported sitting;supported standing  -SO      Recorded by [SO] Renea Be, OTR 04/17/18 1221 04/17/18 1221      Row Name 04/17/18 1219             Upper Body Dressing Assessment/Training    Upper Body Dressing [place] Level doff;don;patel/louisa;contact guard assist  -SO      Upper Body Dressing Position supported sitting;supported  standing  -SO      Recorded by [SO] Renea Be, OTR 04/17/18 1221 04/17/18 1221      Row Name 04/17/18 1219             Grooming Assessment/Training    [place] Level (Grooming) hair care, combing/brushing;set up  -SO      Recorded by [SO] Renea Be, OTR 04/17/18 1221 04/17/18 1221      Row Name 04/17/18 1219 04/17/18 0900          Positioning and Restraints    Pre-Treatment Position sitting in chair/recliner  -SO in bed  -CW     Post Treatment Position chair  -SO chair  -CW     In Chair sitting;call light within reach;encouraged to call for assist  -SO notified nsg;sitting;call light within reach;encouraged to call for assist  -CW     Recorded by [SO] Renea Be, OTR 04/17/18 1221 04/17/18 1221 [CW] Mahesh Galeas, PTA 04/17/18 0915 04/17/18 0915     Row Name 04/17/18 0900             Pain Assessment    Additional Documentation Pain Scale: Numbers Pre/Post-Treatment (Group)  -CW      Recorded by [CW] Mahesh Galeas, PTA 04/17/18 0915 04/17/18 0915      Row Name 04/17/18 1219 04/17/18 0900          Pain Scale: Numbers Pre/Post-Treatment    Pain Scale: Numbers, Pretreatment 0/10 - no pain  -SO 3/10  -CW     Pain Scale: Numbers, Post-Treatment 0/10 - no pain  -SO 6/10  -CW     Pain Location - Side  -- Left  -CW     Pain Location - Orientation  -- lower  -CW     Pain Location  -- foot  -CW     Pain Intervention(s)  -- Repositioned;Ambulation/increased activity  -CW     Recorded by [SO] Renea Be, OTR 04/17/18 1221 04/17/18 1221 [CW] Mahesh Galeas, PTA 04/17/18 0915 04/17/18 0915     Row Name                Wound 04/08/18 0015 Left toe diabetic/neuropathic ulceration    Wound - Properties Group Date first assessed: 04/08/18 [KA] Time first assessed: 0015 [KA] Present On Admission : yes [KA] Side: Left [KA] Location: toe [KA], 3rd  Type: diabetic/neuropathic ulceration [KA] Recorded by:  [KA] Abigail Quan RN 04/08/18 0206 04/08/18 0206    Row Name                 Wound 04/08/18 0015 occipital region laceration    Wound - Properties Group Date first assessed: 04/08/18 [KA] Time first assessed: 0015 [KA] Present On Admission : yes [KA] Location: occipital region [KA] Type: laceration [KA] Number of Staples Placed: 1 [KA] Recorded by:  [KA] Abigail Quan RN 04/08/18 0209 04/08/18 0209    Row Name 04/17/18 0900             Outcome Summary/Treatment Plan (PT)    Anticipated Discharge Disposition (PT) home or self care;home with home health care  -CW      Recorded by [CW] Mahesh Galeas, ZEHRA 04/17/18 0915 04/17/18 0915        User Key  (r) = Recorded By, (t) = Taken By, (c) = Cosigned By    Initials Name Effective Dates Discipline    SO Renea Be, OTR 04/13/15 -  OT    JEANNINE Quan RN 01/26/18 -  Nurse    CW Mahesh Galeas, ZEHRA 03/07/18 -  PT        Wound 04/08/18 0015 Left toe diabetic/neuropathic ulceration (Active)   Dressing Appearance open to air 4/16/2018 10:16 PM   Closure None 4/16/2018 10:16 PM   Base black eschar 4/16/2018 10:16 PM   Periwound edematous;redness 4/16/2018 10:16 PM   Periwound Temperature warm 4/16/2018 10:16 PM   Periwound Skin Turgor soft 4/16/2018 10:16 PM   Edges irregular 4/16/2018 10:16 PM   Drainage Amount none 4/16/2018 10:16 PM   Care, Wound cleansed with 4/16/2018 10:16 PM   Dressing Care, Wound open to air 4/16/2018 10:16 PM       Wound 04/08/18 0015 occipital region laceration (Active)   Dressing Appearance open to air 4/16/2018 10:16 PM   Closure Staples 4/16/2018 10:16 PM   Base closed/resurfaced 4/16/2018 10:16 PM   Periwound ecchymotic;redness 4/16/2018 10:16 PM   Drainage Amount none 4/16/2018 10:16 PM   Dressing Care, Wound open to air 4/16/2018 10:16 PM         Occupational Therapy Education     Title: PT OT SLP Therapies (Done)     Topic: Occupational Therapy (Done)     Point: ADL training (Done)     Description: Instruct learner(s) on proper safety adaptation and remediation techniques during  self care or transfers.   Instruct in proper use of assistive devices.   Learning Progress Summary     Learner Status Readiness Method Response Comment Documented by    Patient Done PAUL Morales,TB ENEDELIA,JESUSITA Ed on gentle AROM for B UE to avoid stiffness but also to avoid flare up pain.  Pt agrees and return demo LE 04/16/18 1220                      User Key     Initials Effective Dates Name Provider Type Discipline    LE 03/07/18 -  Ave Esparza, OTR Occupational Therapist OT                OT Recommendation and Plan     Plan of Care Review  Plan of Care Reviewed With: patient  Plan of Care Reviewed With: patient  Outcome Summary: Improving independence with ADLs, CGA/SBA for shower transfer using rwx and shower chair. Demo slight LOB when standing.        Outcome Measures     Row Name 04/17/18 1200 04/17/18 0900 04/16/18 1224       How much help from another person do you currently need...    Turning from your back to your side while in flat bed without using bedrails?  -- 4  -CW  --    Moving from lying on back to sitting on the side of a flat bed without bedrails?  -- 4  -CW  --    Moving to and from a bed to a chair (including a wheelchair)?  -- 3  -CW  --    Standing up from a chair using your arms (e.g., wheelchair, bedside chair)?  -- 3  -CW  --    Climbing 3-5 steps with a railing?  -- 3  -CW  --    To walk in hospital room?  -- 3  -CW  --    AM-PAC 6 Clicks Score  -- 20  -CW  --       How much help from another is currently needed...    Putting on and taking off regular lower body clothing? 3  -SO  -- 3  -LE    Bathing (including washing, rinsing, and drying) 3  -SO  -- 3  -LE    Toileting (which includes using toilet bed pan or urinal) 3  -SO  -- 4  -LE    Putting on and taking off regular upper body clothing 3  -SO  -- 3  -LE    Taking care of personal grooming (such as brushing teeth) 4  -SO  -- 3  -LE    Eating meals 4  -SO  -- 4  -LE    Score 20  -SO  -- 20  -LE       Functional Assessment    Outcome Measure  Options AM-PAC 6 Clicks Daily Activity (OT)  -SO AM-PAC 6 Clicks Basic Mobility (PT)  -CW  --    Row Name 04/16/18 1200 04/16/18 1000          How much help from another person do you currently need...    Turning from your back to your side while in flat bed without using bedrails?  -- 4  -MS     Moving from lying on back to sitting on the side of a flat bed without bedrails?  -- 4  -MS     Moving to and from a bed to a chair (including a wheelchair)?  -- 3  -MS     Standing up from a chair using your arms (e.g., wheelchair, bedside chair)?  -- 3  -MS     Climbing 3-5 steps with a railing?  -- 3  -MS     To walk in hospital room?  -- 3  -MS     AM-PAC 6 Clicks Score  -- 20  -MS        Functional Assessment    Outcome Measure Options AM-PAC 6 Clicks Daily Activity (OT)  -LE AM-PAC 6 Clicks Basic Mobility (PT)  -MS       User Key  (r) = Recorded By, (t) = Taken By, (c) = Cosigned By    Initials Name Provider Type    JANIA Esparza, OTR Occupational Therapist    SO Renea Be OTR Occupational Therapist    MS Gian Allen, PT Physical Therapist    CW Mahesh Galeas, PTA Physical Therapy Assistant           Time Calculation:         Time Calculation- OT     Row Name 04/17/18 1223             Time Calculation- OT    OT Start Time 1059  -SO      OT Stop Time 1130  -SO      OT Time Calculation (min) 31 min  -SO      OT Received On 04/17/18  -SO        User Key  (r) = Recorded By, (t) = Taken By, (c) = Cosigned By    Initials Name Provider Type    SO WAYNE Barahona Occupational Therapist           Therapy Charges for Today     Code Description Service Date Service Provider Modifiers Qty    [id number] HC OT SELF CARE/MGMT/TRAIN EA 15 MIN 4/17/2018 WAYNE Barahona GO 2               WAYNE Barahona  4/17/2018

## 2018-04-17 NOTE — DISCHARGE SUMMARY
Date of Admission: 4/7/2018  Date of Discharge:  4/22/2018    Discharge Diagnosis:    Sepsis, left foot cellulitis  Bilateral UE weakness progressive, cervical strain  Bilateral shoulder pain L>R - partial thickness tear at supraspinatus tear, DJD  Recent traumatic fall  Diabetic left foot ulcer  DM, uncontrolled  Syncope, -ve hutton    Discharge Medications     Your medication list      START taking these medications      Instructions Last Dose Given Next Dose Due   insulin  UNIT/ML injection  Commonly known as:  humuLIN N,novoLIN N  Notes to patient:  4/17/2018    6 PM       Inject 25 Units under the skin 2 (Two) Times a Day Before Meals for 30 days.       insulin regular 100 UNIT/ML injection  Commonly known as:  HumuLIN R,NovoLIN R  Notes to patient:  4/17/2018   7 PM       Inject 15 Units under the skin Daily With Breakfast & Dinner for 30 days.       levoFLOXacin 750 MG tablet  Commonly known as:  LEVAQUIN  Start taking on:  4/18/2018  Notes to patient:  4/18/2018 12:30pm       Take 1 tablet by mouth Daily for 11 doses.       levothyroxine 137 MCG tablet  Commonly known as:  SYNTHROID, LEVOTHROID  Notes to patient:  4/18/2018  6 AM       Take 1 tablet by mouth Daily for 30 days.          CONTINUE taking these medications      Instructions Last Dose Given Next Dose Due   ALLOPURINOL PO  Notes to patient:  4/18/2018   9 AM       Take  by mouth. Pt unable to verify rate and dose at this time.       cyclobenzaprine 10 MG tablet  Commonly known as:  FLEXERIL      Take 10 mg by mouth Every 8 (Eight) Hours As Needed for Muscle Spasms.       gabapentin 600 MG tablet  Commonly known as:  NEURONTIN  Notes to patient:  4/17/2018   9 PM       Take 600 mg by mouth 3 (Three) Times a Day.       traZODone 50 MG tablet  Commonly known as:  DESYREL  Notes to patient:  4/17/2018  9 PM       Take 1 tablet by mouth Every Night for 30 days.             Where to Get Your Medications      These medications were sent to [facility] [address] [place] [address] ([place], [place] - [address] - [phone]  - [phone] FX  [address] [facility] [address] [place] ([facility] [address]    Phone:  [phone]    insulin  UNIT/ML injection   insulin regular 100 UNIT/ML injection   levoFLOXacin 750 MG tablet   levothyroxine 137 MCG tablet   traZODone 50 MG tablet         Discharge Instructions    Discharge Diet:  No active diet order      Follow-up Appointments  Follow-up Information     Christine Phelps DO, . Schedule an appointment as soon as possible for a visit in 1 week(s).    Specialty:  Family Medicine  Why:  review diabetes meds and arrange for wound care  Contact information:  [address] [facility] [address] [phone]                 Additional Instructions for the Follow-ups that You Need to Schedule     Ambulatory Referral to Diabetic Education    As directed                Presenting Problem/History of Present Illness    Patient is a 62-year-old female.  Smokes a history of diabetes as well as hypertension who lives in [place].  Patient was visiting her daughter in [facility] [place] [facility] [place] to look for apartments.  Subsequently stumbled out of her daughter's car and then apparently tripped over no particular object going down the driveway.  She subsequently then stood up and her daughter describes it as wiping out.  She did not lose consciousness.  She became very weak.  She was brought to the emergency room.  She denied any chest pain did have pain in her arm.  Upon interview she also did have a syncopal episode or stumbling event about 2 weeks ago.  She actually fell downstairs at this time.  Also of note she has been being treated for 2 infections in her toes.  She is even had the left fourth digit operated on recently.     Upon interview the patient is able to speak in full sentences.  She will answer questions with shaking her head.  She is not very interactive.  She will follow commands.  She moves all  extremities upon command.  She is able to state her name as well as identify family members.  She opens her eyes to request.  However she does appear very fatigued.     In the emergency room investigations revealed tachycardia however stable blood pressure and patient's oxygenation is 97% room air.  She did however spike a fever to 38.9.  Lab investigations showed a negative tox screen urinalysis without signs of infection however did show significant glucose or urea and ketonuria.  Her condition panel showed hyperkalemia hyponatremia acute kidney injury with creatinine 1.3 hyperglycemia with a glucose of 437 transaminitis as well as elevation of alkaline phosphatase.  Anion gap was 14.8.  Bicarbonate was 24.  Troponins is negative.  A CT scan was performed in the emergency room which showed no stroke or no acute intracranial pathology.  The CT scan was performed over 6 hours past her initial symptoms.  Pro-calcitonin was elevated.     Radiology imaging of the head was significant for no acute intracranial abnormality.  C-spine CT scan showed some lucency around the C3 fixation screw suggesting some lucency.  Showed postsurgical and degenerative changes but no acute osseous findings.  CT scan of abdomen showed no source of infection did show some constipation.  Chest x-ray was clear.    Hospital Course    62-year-old female with known diabetes.  She had a brief syncopal spell.  She was brought to the emergency room.  Patient had an elevated pro-calcitonin.  Concern was for sepsis.  She also had evidence of several laboratory abnormalities.  She was admitted to the hospital.  She remained somewhat confused.  She demonstrated some swelling and redness of the third digit of the left foot.  Left toe infection was suspected.  She she was seen by vascular for this.  Blood cultures grew MRSA.  Due to altered mental status she was seen by neurology.  They felt this was most likely acute metabolic encephalopathy without  infective endocarditis or acute CVA.  Her MRI head was negative for stroke.  Her JAYE was ordered for evaluation off any possible valvular vegetations.  Her diabetes was felt to be uncontrolled.  Her encephalopathy did improve.  She was continued on treatment for left toe cellulitis.  She was seen by endocrinology for uncontrolled diabetes and they adjusted her regimen.  Her Synthroid dose was confirmed.  Further recommendations for management of cellulitis was given by basilar surgery.    Cardiology made further recommendations to hold on her JAYE.  Medications for diabetes were further adjusted.  Treatment was continued for strep septicemia.  Her mental status continued to improve.  Patient plans to follow-up with the wound care center near her home in [facility].  She was switched to oral antibiotics on the morning of 4/12/18.    On the 13th she developed worsening upper extremity weakness.  She was seen by neurology.  An MRI scan of the cervical spine was ordered.  I saw her the following day.  There was demonstrated triple tenderness in the left shoulder more than the right.  She was started on intravenous steroids for cervical strain by neurology.  An MRI of the shoulders was ordered to see if there was any local etiology related to orthopedic issues.  I also discussed the case with neurology.  She did improve with IV steroids as ordered by neurology.  Patient is offered an orthopedics opinion but she declined.  Her insulin dosage was adjusted by endocrinology.  Her MRI of the shoulder showed degenerative changes in the left shoulder which may explain some of her issues.    As patient is improving she was eventually discharged home.  Per ID:  1. Sepsis due to Strep group C septicemia secondary to left foot cellulitis and tenosynovitis secondary to diabetic foot ulcer  -MRI positive for tendon inflammation but no abscess or osteomyelitis  -stop PCN G  -start levofloxacin 750 mg PO q24h x 3 weeks with stop  date 4/29/18; this will provide strep coverage and also provide good joint/tendon penetration   -will ask wound care team to see her  -asked her to call her PCP to set her up with wound care closer to home in [place], [place] as an outpatient given diabetes and foot ulcer  -repeat BCx NGTD  -does not need a JAYE from my standpoint    I spoke with Dr. Watson subsequent to his note. No need medrol dose-pack on discharge.    Per Endocrine:  Continue Novolin N 25 units twice a day and Novolin R 15 units before breakfast and 15 units before supper.  If discharged, follow-up with primary care physician in one week.  If needed, follow-up with Dr. Chin.   I asked RN to call them for additional supplies on discharge such as syringes, strips and glucometer.      Condition on Discharge:  Stable    Vital Signs past 24hrs  BP range:    Pulse range:    Resp rate range:    Temp range: No data recorded.        Oxygen range:    107 kg (236 lb 12.4 oz); Body mass index is 37.08 kg/m².  No intake or output data in the 24 hours ending 04/22/18 1949    Cardiovascular: Normal rate and regular rhythm.    No murmur heard.  Pulmonary/Chest: Effort normal. No respiratory distress. She has no wheezes. She has no rales.   Abdominal: Soft. Bowel sounds are normal. She exhibits no mass. There is no tenderness.   Musculoskeletal: She exhibits edema (1+ on left).   Neurological:   Similar weakness in LUE (proximal)   Skin: No rash noted.     Procedures Performed:    * No surgery found *    Consults:    Consults     Date and Time Order Name Status Description    4/13/2018 1215 Inpatient Neurology Consult Completed     4/9/2018 0957 Inpatient Endocrinology Consult Completed     4/8/2018 1629 Inpatient Infectious Diseases Consult Completed     4/8/2018 1626 Inpatient Neurology Consult Completed     4/8/2018 0030 Inpatient Vascular Surgery Consult Completed     4/7/2018 2204 [facility] (on-call MD unless specified) Completed     4/7/2018 2151 Pulmonology  (on-call MD unless specified) Completed     4/7/2018 2101 [facility] (on-call MD unless specified) Completed     4/7/2018 2037 Pulmonology (on-call MD unless specified) Completed           Pertinent Test Results:        Results for orders placed during the hospital encounter of 04/07/18   Adult Transthoracic Echo Complete W/ Cont if Necessary Per Protocol    Narrative · Left ventricular systolic function is hyperdynamic (EF > 70).  · The left ventricular cavity is small.  · There is an echogenic mass on the right side of the intraatrial septum  · Although poorly visualized there appears to be a mass in the right   atrium crossing into the right ventricle. Could represent transitory clot   or large vegetation          Results from last 7 days  Lab Units 04/17/18  0618   WBC 10*3/mm3 8.39   HEMOGLOBIN g/dL 11.5*   HEMATOCRIT % 35.9   PLATELETS 10*3/mm3 277       Microbiology Results (last 10 days)     ** No results found for the last 240 hours. **            Results from last 7 days  Lab Units 04/17/18  0618   SODIUM mmol/L 137   POTASSIUM mmol/L 3.9   CHLORIDE mmol/L 99   CO2 mmol/L 27.4   BUN mg/dL 35*   CREATININE mg/dL 0.77           Invalid input(s): LABALBU, PROT    Pertinent Imaging Results:    Imaging Results (all)     Procedure Component Value Units Date/Time    MRI Shoulder Left Without Contrast [[id number]] Collected:  04/16/18 1007     Updated:  04/16/18 1332    Narrative:       LEFT SHOULDER MRI WITHOUT CONTRAST     HISTORY: Neck and shoulder pain.     TECHNIQUE: MRI of the left shoulder including axial and oblique coronal  proton density, oblique coronal T1, and oblique sagittal T2 sequences  was performed. The patient had difficulty holding still and was unable  to complete the coronal T2 sequence. The exam is somewhat limited by the  extensive motion but there is significant diagnostic yield and to avoid  further delay in the patient's care I am proceeding with the report.     FINDINGS: Marrow signal around  the shoulder is normal. There is no joint  or bursal effusion. Advanced arthritic change is observed at the  glenohumeral joint. There is some bony remodeling and flattening of the  articular surface of the humeral head with marginal osteophytes.  Full-thickness chondral loss is observed. Glenoid anteversion appears  normal. The long head biceps tendon appears to be grossly intact  although tendinopathy or partial thickness tear would be obscured. The  axial proton density and the coronal T1 images shows what appears to be  susceptibility artifact in the region of the distal supraspinatus and  infraspinatus tendons, consistent with previous shoulder surgery. There  also appears to have been previous acromioplasty. Shoulder musculature  is globally deconditioned but there is no musculotendinous retraction  nor any focal muscle atrophy of any of the rotator cuff muscles. A small  rotator cuff tear/recurrent tear, particularly at the distal  supraspinatus tendon, might be obscured. There is no evidence of any  complete supraspinatus tendon tear. The infraspinatus, teres minor, and  subscapularis appear intact. There is no lesion in the spinoglenoid  notch, quadrilateral space, nor the visualized axilla.       Impression:       Fine anatomic detail on this exam is limited by motion and  one of the usually included sequences was not run as the patient was  unable to tolerate the full exam. There is clearly advanced degenerative  change at the glenohumeral joint. There appears to have been previous  left shoulder surgery, possibly supraspinatus tendon repair. A small  partial-thickness or full-thickness recurrent tear at the supraspinatus  might be obscured on this exam. There is no evidence of any complete  recurrent rotator cuff tear. No soft tissue edema, joint, or bursal  effusion is present to suggest active inflammation or infection.     This report was finalized on 4/16/2018 1:29 PM by Dr. Nagi Avalos MD.        MRI Cervical Spine Without Contrast [[id number]] Collected:  04/13/18 2042     Updated:  04/13/18 2050    Narrative:       CERVICAL MRI WITHOUT CONTRAST     HISTORY: Fall a few days ago with a blow to the back of the head.  Bilateral shoulder pain.     TECHNIQUE: MRI of the cervical spine was performed without contrast. The  patient had a difficult time holding still for this exam and fine  anatomic detail is degraded to some degree by motion on most of the  sequences.      COMPARISON: This is correlated with cervical spine CT performed  04/07/2018.      FINDINGS: There has been previous C4-C7 anterior fusion with C5  corpectomy. The degree of osseous fusion was demonstrated to better  advantage on CT. CT also demonstrated ankylosis across the right C3-4  facet joint. Marrow signal throughout the cervical spine is normal.  There is no soft tissue edema around the cervical spine. The  articulation of the skull base with C1 and C1 with C2 appear normal.     At C2-3, the disc appears normal. There is some facet arthropathy on the  left. There appears to be some bony foraminal stenosis on the left. The  right foramen is patent.     At C3-4, there is disc narrowing with facet arthropathy and ankylosis on  the right as already mentioned. The left facet joint is preserved. The  canal is patent. There is some bony foraminal stenosis on the right.     The canal from C4 through C7 appears patent. The foramina at these  levels are not optimally demonstrated but grossly appear patent.     At C7-T1, there is disc narrowing. The canal and the right foramen are  patent. There is severe bony foraminal stenosis on the left. This is  also demonstrated on CT. The CT shows solid ankylosis between the C7 and  the T1 vertebrae. The discs at T1-2 and T2-3 appear normal and the canal  and foramina at those levels are patent.       Impression:       There has been surgical fusion from C4 through C7 and there  is also ankylosis across the  C7-T1 disc and across the right C3-4 facet  joint. There are several cervical foramina which appear stenotic due to  bony overgrowth. No canal stenosis or disc herniation is present. The  cord appears normal in caliber and grossly normal in signal. No  paraspinous soft tissue edema or hematoma is identified. There is no  evidence of odontoid fracture.     This report was finalized on 4/13/2018 8:47 PM by Dr. Nagi Avalos MD.       MRI Foot Right Without Contrast [[id number]] Collected:  04/09/18 1849     Updated:  04/10/18 0755    Narrative:       RIGHT MID AND FOREFOOT MRI WITHOUT CONTRAST     HISTORY: Soft tissue swelling. Evaluate for infection.     Limited MRI of the right mid and forefoot was performed using short axis  STIR and T1 images. The patient had a hard time holding still for the  exam which had to be discontinued without completing the entire  protocol. Left mid and forefoot MRI was performed yesterday and is  reported separately.     FINDINGS: There is soft tissue edema in the right mid and forefoot.  There is no bone marrow signal abnormality around the midfoot or the  forefoot to suggest osteomyelitis. Foot musculature is deconditioned but  not frankly atrophic. No focal fluid collection is identified.       Impression:       Right forefoot MRI is somewhat limited but there is no  findings suspicious of osteomyelitis or abscess.     This report was finalized on 4/10/2018 7:52 AM by Dr. Nagi Avalos MD.       MRI Brain Without Contrast [[id number]] Collected:  04/08/18 1933     Updated:  04/10/18 0547    Narrative:       BRAIN MRI WITHOUT GADOLINIUM     HISTORY: Confusion/delirium, altered LOC, unexplained.     COMPARISON: None.     FINDINGS:  Multiplanar images of the head were obtained without the use  of intravenous contrast.     There is a mild degree of generalized atrophy. There are a few scattered  foci of increased T2 and FLAIR signal abnormality in the white matter  bilaterally most  likely sequelae of mild/early ischemic gliotic change.  No foci of restricted diffusion are demonstrated on diffusion weighted  images (DWI) to suggest acute ischemia. Remainder of the brain  parenchyma is unremarkable. The ventricular system is normal in caliber  and configuration for age. Midline structures corpus callosum, pituitary  gland, and brainstem are unremarkable. There is no evidence of extra  axial mass or fluid collection. The major vascular flow voids are  patent. The orbital and encompassed paranasal soft tissues are  unremarkable. Osseous marrow signal intensity is within normal limits.     Preliminary interpretation telephoned directly to Dr. Marquez during  interpretation at 7:19 PM.                Impression:       1. Very mild atrophy and chronic appearing ischemic gliotic changes, no  acute intracranial finding     This report was finalized on 4/10/2018 5:43 AM by Gian Vance MD.       MRI Foot Left With & Without Contrast [[id number]] Collected:  04/09/18 1011     Updated:  04/09/18 1507    Narrative:       LEFT MID AND FOREFOOT MRI WITH AND WITHOUT CONTRAST     HISTORY: Sepsis with cellulitis and swelling at the 3rd toe.     TECHNIQUE: MRI of the left mid and forefoot was performed with and  without IV contrast. There is no previous exam for comparison.     FINDINGS: Marrow signal throughout the mid and forefoot is normal.  Specifically, there is no marrow signal abnormality of the 3rd toe.  There is soft tissue edema throughout the mid and forefoot with soft  tissue enhancement along the 3rd toe. No fluid collection is observed at  the 3rd toe. The interphalangeal joints and the metatarsophalangeal  joints demonstrate normal amount of fluid and no abnormal contrast  enhancement.     There is some degenerative change at the 1st and 2nd tarsometatarsal  joints with a small volume of fluid around the plantar edge of the 1st  tarsometatarsal joint. There is also a small volume of fluid in  the  flexor hallucis longus tendon sheath at the same level. Some soft tissue  edema and enhancement is observed along the medial plantar surface of  the foot in the same region as the small fluid collections. There is no  marrow signal abnormality around the 1st metatarsophalangeal joint,  however. No other fluid collection is identified in the mid or forefoot.  The flexor and extensor tendons appear intact.       Impression:       Cellulitis at the 3rd toe without evidence of osteomyelitis  or septic arthritis.     There is some fluid in the flexor hallucis longus tendon sheath and  plantar to the 1st metatarsophalangeal joint, favored to be arising from  that joint, with some soft tissue enhancement along the plantar surface  of the foot in the same region. Given the clinical scenario, it is  possible that these changes are infectious. There is no evidence of  osteomyelitis around the visualized midfoot. The entire hindfoot is not  included in this exam.     This report was finalized on 4/9/2018 3:04 PM by Dr. Nagi Avalos MD.       XR Shoulder 2+ View Right [[id number]] Collected:  04/08/18 1634     Updated:  04/08/18 1946    Narrative:       RIGHT SHOULDER 2 VIEWS 04/08/2018      HISTORY: Right shoulder pain.     FINDINGS: There is some minimal hypertrophic change of the inferior  glenoid. No fractures or dislocations are seen. AC joint appears intact.       Impression:       1. No acute process.  2. Minimal hypertrophic change in the inferior glenoid.     This report was finalized on 4/8/2018 7:43 PM by Dr. Tim Gunderson MD.       CT Abdomen Pelvis Without Contrast [[id number]] Collected:  04/07/18 2021     Updated:  04/07/18 2028    Narrative:       NONCONTRAST CT SCANS ABDOMEN AND PELVIS     HISTORY: ab pain     COMPARISON: None.     TECHNIQUE:Radiation dose reduction techniques were utilized, including  automated exposure control and exposure modulation based on body size.   Axial images were obtained  from the lung bases to the symphysis pubis  without oral or IV contrast per request.      FINDINGS ABDOMEN CT:  There is motion and body habitus related artifact.  Within these limitations, the solid organs appear grossly unremarkable.  Aorta nonaneurysmal. No ascites. Numerous colonic diverticuli, normal  appendix.     FINDINGS PELVIS CT:  The GI tract not opacified for assessment but non  obstructive in appearance. Abundant fecal material suggests  constipation. Bone windows demonstrate nondisplaced fractures of the  right L1 and L2 transverse processes.                Impression:       1.  Colonic diverticulosis, constipation.  2. Nondisplaced right transverse process fractures of L1 and L2.  3. Grossly unremarkable noncontrast exam otherwise considering artifact.     This report was finalized on 4/7/2018 8:25 PM by Gian Vance MD.       CT Cervical Spine Without Contrast [[id number]] Collected:  04/07/18 2017     Updated:  04/07/18 2024    Narrative:       NONCONTRAST CT SCAN CERVICAL SPINE     CLINICAL HISTORY: head injury     COMPARISON: None.     TECHNIQUE: Radiation dose reduction techniques were utilized, including  automated exposure control and exposure modulation based on body size.  Axial noncontrast images of the cervical spine were obtained without  contrast. Sagittal reformatted images were supplemented.     FINDINGS:  No acute vertebral fracture identified on the axial series.  There are extensive postsurgical changes. Partial corpectomy defect of  the C4 vertebra is noted along the more central and right lateral  aspect. A metallic plate traverses the C3-C6 vertebrae anteriorly with  bilateral fixation screws at C3, C5, and C6. There is some lucency about  both of the C3 fixation screw suggesting loosening. The C5 and C6  fixation screws appear to be well secured.     The vertebrae are well aligned and well maintained in height and stature  on the sagittal reformatted images.  No significant  compression  deformity or retropulsion.     Degenerative changes are present. These are most pronounced in the mid  and lower cervical spine, particularly at C6-7 followed by C5-6.  Marginal osteophytes contribute to multilevel canal and foraminal  narrowing, again mostly in the mid and lower cervical levels. The facets  are well aligned. Mild multilevel facet arthropathy is present.                      Impression:       1. Postsurgical and degenerative changes as discussed, no acute osseous  finding     This report was finalized on 4/7/2018 8:20 PM by Gian Vance MD.       CT Head Without Contrast [[id number]] Collected:  04/07/18 2016     Updated:  04/07/18 2020    Narrative:       CRANIAL CT SCAN WITHOUT CONTRAST     CLINICAL HISTORY: head injury     COMPARISON: None.     TECHNIQUE: Radiation dose reduction techniques were utilized, including  automated exposure control and exposure modulation based on body size.  Multiple axial images of the head were obtained without contrast.      FINDINGS:  There are no abnormal areas of increased density or mass  effect.      Ventricles, sulci, and cisterns appear normal. Bone window  images are unremarkable.                Impression:       1. No acute intracranial abnormality.                     This study was performed with techniques to keep radiation doses as low  as reasonably achievable (ALARA). Individualized dose reduction  techniques using automated exposure control or adjustment of mA and/or  kV according to the patient size were employed.      This report was finalized on 4/7/2018 8:17 PM by Gian Vance MD.       XR Chest 1 View [[id number]] Collected:  04/07/18 2003     Updated:  04/07/18 2007    Narrative:       PORTABLE CHEST X-RAY     HISTORY: short of breath     COMPARISON: None.     FINDINGS: Portable AP view of the chest was obtained. Lungs are under  aerated but grossly  clear where visualized. Cardiac margins largely  obscured. Vascularity felt to  be normal considering poor inspiration.    No obvious significant pleural fluid collections.              Impression:          Expiratory radiograph without gross active air space disease process     This report was finalized on 4/7/2018 8:04 PM by Gian Vance MD.               Discharge Disposition  Home    Activity at Discharge:      Test Results Pending at Discharge       Bob Olivier MD  04/22/18  7:49 PM    Time: I spent over 30mins in the discharge planning of this patient.    Some of this encounter note is an electronic transcription/translation of spoken language to printed text.    Orders Placed during this admission:    Orders Placed This Encounter   Procedures   • Laceration Repair   • Critical Care   • Blood Culture - Blood,   • Blood Culture - Blood,   • Respiratory Panel, PCR - Swab, Nasopharynx   • Blood Culture ID, PCR - Blood,   • Blood Culture - Blood,   • Blood Culture - Blood,   • CT Head Without Contrast   • CT Cervical Spine Without Contrast   • XR Chest 1 View   • CT Abdomen Pelvis Without Contrast   • MRI Foot Left With & Without Contrast   • XR Shoulder 2+ View Right   • MRI Brain Without Contrast   • MRI Foot Right Without Contrast   • MRI Cervical Spine Without Contrast   • MRI Shoulder Left Without Contrast   • [place] Draw   • Comprehensive Metabolic Panel   • Magnesium   • Troponin   • CBC Auto Differential   • CK   • Urinalysis With / Microscopic If Indicated - Urine, Clean Catch   • Ethanol   • Urine Drug Screen - Urine, Clean Catch   • Procalcitonin   • Lactic Acid, Plasma   • Blood Gas, Arterial   • C-reactive Protein   • Lactic Acid, Reflex Timer (This will reflex a repeat order 3-3:15 hours after ordered.)   • Blood Gas, Arterial   • CBC Auto Differential   • Lactic Acid, Plasma   • [place] Spotted Fever, IgG   • [place] Spotted Fever, IgM   • E[place]lich[place] Antibody Panel   • B. [place] Antibodies, WB Reflex   • [place] Microti Antibody Panel   • Uric Acid   • Lactic  Acid, Plasma   • Blood Gas, Arterial   • Blood Gas, Arterial   • Hemoglobin A1c   • CBC Auto Differential   • Scan Slide   • CBC (No Diff)   • Immature Platelet Fraction   • CBC (No Diff)   • Basic Metabolic Panel   • CBC (No Diff)   • Creatinine, Serum   • C-reactive Protein   • Basic Metabolic Panel   • CBC (No Diff)   • Lipid Panel   • T4, Free   • TSH   • Basic Metabolic Panel   • CBC (No Diff)   • Ambulatory Referral to Diabetic Education   • Undress and Gown   • Vital Signs   • Orthostatic blood pressure   • Do NOT Hold Basal Insulin When Patient is NPO, Hold Bolus Dose if NPO   • Follow [facility] Hypoglycemia Protocol For Blood Glucose Less Than 70 mg/dL   • Hypoglycemia Treatment - Alert Patient That is Not NPO and Can Safely Swallow   • Hypoglycemia Treatment - Patient Has IV Access - Unresponsive, NPO or Unable To Safely Swallow   • Hypoglycemia Treatment - Patient Without IV Access - Unresponsive, NPO or Unable To Safely Swallow   • Notify Provider of event. Communicate needs and document in EMR.   • Document event and patients response to treatment in EMR, any medications given to be documented on MAR.   • Height & Weight   • Place Sequential Compression Device   • Please change the patient's Accu-Cheks every 4 hours and use ICU insulin sliding scale  Nursing Communication   • Pls soap and water wash both feet daily Betadine paint left third toe bid  Misc Nursing Order (Specify)   • Pulmonology (on-call MD unless specified)   • [facility] (on-call MD unless specified)   • Pulmonology (on-call MD unless specified)   • [facility] (on-call MD unless specified)   • Inpatient Vascular Surgery Consult   • Inpatient Neurology Consult   • Inpatient Infectious Diseases Consult   • Inpatient Case Management  Consult   • Inpatient Diabetes Educator Consult   • Inpatient Endocrinology Consult   • Inpatient Case Management  Consult   • Inpatient Case Management  Consult   • Inpatient  Neurology Consult   • Oxygen Therapy- Nasal Cannula; 2 LPM; Titrate for SPO2: equal to or greater than, 92%   • Overnight Sleep Oximetry Study   • SCANNED - TELEMETRY     • SCANNED - TELEMETRY     • SCANNED - TELEMETRY     • SCANNED - TELEMETRY     • SCANNED - TELEMETRY     • POC Glucose Once   • POC Glucose Once   • POC Glucose Once   • POC Glucose Once   • POC Glucose Once   • POC Glucose Once   • POC Glucose Once   • POC Glucose Once   • POC Glucose Once   • POC Glucose Once   • POC Glucose Once   • POC Glucose Once   • POC Glucose Once   • POC Glucose Once   • POC Glucose Once   • POC Glucose Once   • POC Glucose Once   • POC Glucose Once   • POC Glucose Once   • POC Glucose Once   • POC Glucose Once   • POC Glucose Once   • POC Glucose Once   • POC Glucose Once   • POC Glucose Once   • POC Glucose Once   • POC Glucose Once   • POC Glucose Once   • POC Glucose Once   • POC Glucose Once   • POC Glucose Once   • POC Glucose Once   • POC Glucose Once   • POC Glucose Once   • POC Glucose Once   • POC Glucose Once   • POC Glucose Once   • POC Glucose Once   • POC Glucose Once   • POC Glucose Once   • POC Glucose Once   • POC Glucose Once   • POC Glucose Once   • ECG 12 Lead   • ECG 12 Lead   • Adult Transthoracic Echo Complete W/ Cont if Necessary Per Protocol   • Wound Ostomy Eval & Treat   • Insert peripheral IV   • Insert Peripheral IV   • Inpatient Admission   • Inpatient Admission   • Transfer Patient   • Discharge patient   • CBC & Differential   • Light Blue Top   • Green Top (Gel)   • Lavender Top   • Gold Top - SST

## 2018-04-17 NOTE — NURSING NOTE
"Diabetes Education  Assessment/Teaching    Patient Name:  Elisabet Berry  YOB: 1955  MRN: [id number]  Admit Date:  4/7/2018      Assessment Date:  4/17/2018  Flowsheet Row Most Recent Value   General Information    Referral From: Other f/u with pt at bedside to supply samples for dc to daughter's home.   Height 170.2 cm (67.01\")   Height Method Stated   Weight 107 kg (236 lb 12.4 oz)   What type of diabetes do you have? Type 2   Have you had diabetes education/teaching in the past? -- [pt has had DM ed initiated here inpt. ]   Education Preferences   Barriers to Learning -- [pt does not have medical/insurance ]   Assessment Topics   Taking Medication - Assessment -- [Review: NPH, R insulin scheduling for dc. ]   Reducing Risk - Assessment Needs education   Healthy Coping - Assessment Competent       Flowsheet Row Most Recent Value   DM Education Needs   Meter Meter provided-give pt free/sample Contour BG meter with sample test strips supply for interim while pt will be staying with her daughter    Meter Type Contour [Outline meter instructions. Pt reports having this meter. ]   Medication Insulin [Review NPH, R insulin scheduling for dc. ]   Problem Solving Hypoglycemia, Treatment Urge pt to take BG meter with her as well as supply of CHO/carbs in case of low BG episode.    Reducing Risks A1C testing   Healthy Coping Appropriate   Discharge Plan Home, Follow-up with PCP [pt to dc home with dtr for 2 wks. ]   Motivation Engaged, Moderate   Teaching Method Handouts, Explanation, Demonstration, Teach back   Patient Response Verbalized understanding            Other Comments: Pt reports plan at this time is to  meds at [facility]. Plan at this time for DM is home with NPH 25 units bid and Regular insulin 15 units ac breakfast & dinner.         Electronically signed by:  Arpita Cohen, RN, BSN, CDE   04/17/18 3:57 PM  "

## 2018-09-19 RX ORDER — CLOBETASOL PROPIONATE 0.5 MG/G
OINTMENT TOPICAL
Qty: 60 G | Refills: 2 | Status: ON HOLD | OUTPATIENT
Start: 2018-09-19 | End: 2019-10-11 | Stop reason: CLARIF

## 2019-10-07 ENCOUNTER — ANESTHESIA EVENT (OUTPATIENT)
Dept: OPERATING ROOM | Age: 64
End: 2019-10-07
Payer: MEDICARE

## 2019-10-08 ENCOUNTER — OFFICE VISIT (OUTPATIENT)
Dept: DERMATOLOGY | Age: 64
End: 2019-10-08
Payer: MEDICARE

## 2019-10-08 DIAGNOSIS — B00.9 HSV INFECTION: ICD-10-CM

## 2019-10-08 DIAGNOSIS — L30.0 NUMMULAR ECZEMA: ICD-10-CM

## 2019-10-08 DIAGNOSIS — D22.9 BENIGN NEVUS: ICD-10-CM

## 2019-10-08 DIAGNOSIS — L57.0 KERATOSIS, ACTINIC: Primary | ICD-10-CM

## 2019-10-08 PROCEDURE — 1036F TOBACCO NON-USER: CPT | Performed by: DERMATOLOGY

## 2019-10-08 PROCEDURE — G8484 FLU IMMUNIZE NO ADMIN: HCPCS | Performed by: DERMATOLOGY

## 2019-10-08 PROCEDURE — 3017F COLORECTAL CA SCREEN DOC REV: CPT | Performed by: DERMATOLOGY

## 2019-10-08 PROCEDURE — G8427 DOCREV CUR MEDS BY ELIG CLIN: HCPCS | Performed by: DERMATOLOGY

## 2019-10-08 PROCEDURE — 99214 OFFICE O/P EST MOD 30 MIN: CPT | Performed by: DERMATOLOGY

## 2019-10-08 PROCEDURE — G8417 CALC BMI ABV UP PARAM F/U: HCPCS | Performed by: DERMATOLOGY

## 2019-10-08 RX ORDER — FLUOROURACIL 50 MG/G
CREAM TOPICAL
Qty: 40 G | Refills: 0 | Status: ON HOLD | OUTPATIENT
Start: 2019-10-08 | End: 2019-10-11 | Stop reason: CLARIF

## 2019-10-08 RX ORDER — CLOBETASOL PROPIONATE 0.5 MG/G
CREAM TOPICAL
Qty: 60 G | Refills: 2 | Status: SHIPPED | OUTPATIENT
Start: 2019-10-08

## 2019-10-08 RX ORDER — VALACYCLOVIR HYDROCHLORIDE 1 G/1
TABLET, FILM COATED ORAL
Qty: 4 TABLET | Refills: 2 | Status: ON HOLD | OUTPATIENT
Start: 2019-10-08 | End: 2019-10-11 | Stop reason: CLARIF

## 2019-10-10 ENCOUNTER — HOSPITAL ENCOUNTER (OUTPATIENT)
Age: 64
Setting detail: OBSERVATION
Discharge: HOME | End: 2019-10-11
Attending: ORTHOPAEDIC SURGERY | Admitting: ORTHOPAEDIC SURGERY
Payer: MEDICARE

## 2019-10-10 ENCOUNTER — ANESTHESIA (OUTPATIENT)
Dept: OPERATING ROOM | Age: 64
End: 2019-10-10
Payer: MEDICARE

## 2019-10-10 VITALS — TEMPERATURE: 99.1 F | DIASTOLIC BLOOD PRESSURE: 74 MMHG | OXYGEN SATURATION: 92 % | SYSTOLIC BLOOD PRESSURE: 153 MMHG

## 2019-10-10 DIAGNOSIS — M14.672 CHARCOT'S JOINT OF ANKLE, LEFT: Primary | ICD-10-CM

## 2019-10-10 LAB
GLUCOSE BLD-MCNC: 126 MG/DL (ref 70–99)
GLUCOSE BLD-MCNC: 152 MG/DL (ref 70–99)
GLUCOSE BLD-MCNC: 176 MG/DL (ref 70–99)
PERFORMED ON: ABNORMAL

## 2019-10-10 PROCEDURE — 36415 COLL VENOUS BLD VENIPUNCTURE: CPT

## 2019-10-10 PROCEDURE — 64445 NJX AA&/STRD SCIATIC NRV IMG: CPT | Performed by: ANESTHESIOLOGY

## 2019-10-10 PROCEDURE — 2580000003 HC RX 258: Performed by: ORTHOPAEDIC SURGERY

## 2019-10-10 PROCEDURE — 7100000000 HC PACU RECOVERY - FIRST 15 MIN: Performed by: ORTHOPAEDIC SURGERY

## 2019-10-10 PROCEDURE — 94761 N-INVAS EAR/PLS OXIMETRY MLT: CPT

## 2019-10-10 PROCEDURE — 2580000003 HC RX 258: Performed by: ANESTHESIOLOGY

## 2019-10-10 PROCEDURE — 2709999900 HC NON-CHARGEABLE SUPPLY: Performed by: ORTHOPAEDIC SURGERY

## 2019-10-10 PROCEDURE — 3600000014 HC SURGERY LEVEL 4 ADDTL 15MIN: Performed by: ORTHOPAEDIC SURGERY

## 2019-10-10 PROCEDURE — 6370000000 HC RX 637 (ALT 250 FOR IP): Performed by: INTERNAL MEDICINE

## 2019-10-10 PROCEDURE — 6370000000 HC RX 637 (ALT 250 FOR IP): Performed by: ORTHOPAEDIC SURGERY

## 2019-10-10 PROCEDURE — 2580000003 HC RX 258: Performed by: NURSE ANESTHETIST, CERTIFIED REGISTERED

## 2019-10-10 PROCEDURE — 3600000004 HC SURGERY LEVEL 4 BASE: Performed by: ORTHOPAEDIC SURGERY

## 2019-10-10 PROCEDURE — 80048 BASIC METABOLIC PNL TOTAL CA: CPT

## 2019-10-10 PROCEDURE — 76942 ECHO GUIDE FOR BIOPSY: CPT | Performed by: ANESTHESIOLOGY

## 2019-10-10 PROCEDURE — 2720000010 HC SURG SUPPLY STERILE: Performed by: ORTHOPAEDIC SURGERY

## 2019-10-10 PROCEDURE — C1769 GUIDE WIRE: HCPCS | Performed by: ORTHOPAEDIC SURGERY

## 2019-10-10 PROCEDURE — G0378 HOSPITAL OBSERVATION PER HR: HCPCS

## 2019-10-10 PROCEDURE — 6360000002 HC RX W HCPCS

## 2019-10-10 PROCEDURE — 2700000000 HC OXYGEN THERAPY PER DAY

## 2019-10-10 PROCEDURE — 6360000002 HC RX W HCPCS: Performed by: NURSE ANESTHETIST, CERTIFIED REGISTERED

## 2019-10-10 PROCEDURE — 83036 HEMOGLOBIN GLYCOSYLATED A1C: CPT

## 2019-10-10 PROCEDURE — C1713 ANCHOR/SCREW BN/BN,TIS/BN: HCPCS | Performed by: ORTHOPAEDIC SURGERY

## 2019-10-10 PROCEDURE — C1762 CONN TISS, HUMAN(INC FASCIA): HCPCS | Performed by: ORTHOPAEDIC SURGERY

## 2019-10-10 PROCEDURE — 3700000000 HC ANESTHESIA ATTENDED CARE: Performed by: ORTHOPAEDIC SURGERY

## 2019-10-10 PROCEDURE — 6360000002 HC RX W HCPCS: Performed by: ORTHOPAEDIC SURGERY

## 2019-10-10 PROCEDURE — 3700000001 HC ADD 15 MINUTES (ANESTHESIA): Performed by: ORTHOPAEDIC SURGERY

## 2019-10-10 PROCEDURE — 7100000001 HC PACU RECOVERY - ADDTL 15 MIN: Performed by: ORTHOPAEDIC SURGERY

## 2019-10-10 PROCEDURE — 94150 VITAL CAPACITY TEST: CPT

## 2019-10-10 PROCEDURE — 87641 MR-STAPH DNA AMP PROBE: CPT

## 2019-10-10 DEVICE — FIXATION BEAM, 4.5 X 80 MM
Type: IMPLANTABLE DEVICE | Site: FOOT | Status: FUNCTIONAL
Brand: AXIS

## 2019-10-10 DEVICE — X-CLIP, 4.5 X 15 MM
Type: IMPLANTABLE DEVICE | Site: FOOT | Status: FUNCTIONAL
Brand: AXIS

## 2019-10-10 DEVICE — BONE GRAFT KIT 7510100 INFUSE X SMALL
Type: IMPLANTABLE DEVICE | Site: FOOT | Status: FUNCTIONAL
Brand: INFUSE® BONE GRAFT

## 2019-10-10 DEVICE — FIXATION BEAM, 5.5 X 85 MM
Type: IMPLANTABLE DEVICE | Site: FOOT | Status: FUNCTIONAL
Brand: AXIS

## 2019-10-10 DEVICE — GRAFT BONE SUB 5CC VIABLE BONE MTRX BIOFUSE: Type: IMPLANTABLE DEVICE | Site: FOOT | Status: FUNCTIONAL

## 2019-10-10 DEVICE — FIXATION BEAM, 7.5 X 75 MM
Type: IMPLANTABLE DEVICE | Site: FOOT | Status: FUNCTIONAL
Brand: AXIS

## 2019-10-10 DEVICE — X-CLIP, 7.5 X 25 MM
Type: IMPLANTABLE DEVICE | Site: FOOT | Status: FUNCTIONAL
Brand: AXIS

## 2019-10-10 RX ORDER — HYDRALAZINE HYDROCHLORIDE 20 MG/ML
5 INJECTION INTRAMUSCULAR; INTRAVENOUS EVERY 10 MIN PRN
Status: DISCONTINUED | OUTPATIENT
Start: 2019-10-10 | End: 2019-10-10 | Stop reason: HOSPADM

## 2019-10-10 RX ORDER — CYCLOBENZAPRINE HCL 10 MG
10 TABLET ORAL 3 TIMES DAILY PRN
Status: DISCONTINUED | OUTPATIENT
Start: 2019-10-10 | End: 2019-10-11 | Stop reason: HOSPADM

## 2019-10-10 RX ORDER — SODIUM CHLORIDE 9 MG/ML
INJECTION, SOLUTION INTRAVENOUS CONTINUOUS PRN
Status: DISCONTINUED | OUTPATIENT
Start: 2019-10-10 | End: 2019-10-10 | Stop reason: SDUPTHER

## 2019-10-10 RX ORDER — DEXTROSE MONOHYDRATE 25 G/50ML
12.5 INJECTION, SOLUTION INTRAVENOUS PRN
Status: DISCONTINUED | OUTPATIENT
Start: 2019-10-10 | End: 2019-10-11 | Stop reason: HOSPADM

## 2019-10-10 RX ORDER — METOPROLOL TARTRATE 50 MG/1
50 TABLET, FILM COATED ORAL DAILY
Status: DISCONTINUED | OUTPATIENT
Start: 2019-10-11 | End: 2019-10-11 | Stop reason: HOSPADM

## 2019-10-10 RX ORDER — MIDAZOLAM HYDROCHLORIDE 1 MG/ML
INJECTION INTRAMUSCULAR; INTRAVENOUS PRN
Status: DISCONTINUED | OUTPATIENT
Start: 2019-10-10 | End: 2019-10-10 | Stop reason: SDUPTHER

## 2019-10-10 RX ORDER — ONDANSETRON 2 MG/ML
4 INJECTION INTRAMUSCULAR; INTRAVENOUS EVERY 6 HOURS PRN
Status: DISCONTINUED | OUTPATIENT
Start: 2019-10-10 | End: 2019-10-11 | Stop reason: HOSPADM

## 2019-10-10 RX ORDER — SODIUM CHLORIDE 9 MG/ML
INJECTION, SOLUTION INTRAVENOUS CONTINUOUS
Status: DISCONTINUED | OUTPATIENT
Start: 2019-10-10 | End: 2019-10-11 | Stop reason: HOSPADM

## 2019-10-10 RX ORDER — PROMETHAZINE HYDROCHLORIDE 25 MG/ML
6.25 INJECTION, SOLUTION INTRAMUSCULAR; INTRAVENOUS
Status: DISCONTINUED | OUTPATIENT
Start: 2019-10-10 | End: 2019-10-10 | Stop reason: HOSPADM

## 2019-10-10 RX ORDER — SODIUM CHLORIDE, SODIUM LACTATE, POTASSIUM CHLORIDE, CALCIUM CHLORIDE 600; 310; 30; 20 MG/100ML; MG/100ML; MG/100ML; MG/100ML
INJECTION, SOLUTION INTRAVENOUS CONTINUOUS
Status: DISCONTINUED | OUTPATIENT
Start: 2019-10-10 | End: 2019-10-11 | Stop reason: HOSPADM

## 2019-10-10 RX ORDER — ONDANSETRON 2 MG/ML
4 INJECTION INTRAMUSCULAR; INTRAVENOUS PRN
Status: DISCONTINUED | OUTPATIENT
Start: 2019-10-10 | End: 2019-10-10 | Stop reason: HOSPADM

## 2019-10-10 RX ORDER — MEPERIDINE HYDROCHLORIDE 25 MG/ML
12.5 INJECTION INTRAMUSCULAR; INTRAVENOUS; SUBCUTANEOUS EVERY 5 MIN PRN
Status: DISCONTINUED | OUTPATIENT
Start: 2019-10-10 | End: 2019-10-10 | Stop reason: HOSPADM

## 2019-10-10 RX ORDER — LISINOPRIL 20 MG/1
20 TABLET ORAL DAILY
Status: DISCONTINUED | OUTPATIENT
Start: 2019-10-10 | End: 2019-10-11 | Stop reason: HOSPADM

## 2019-10-10 RX ORDER — GABAPENTIN 300 MG/1
300 CAPSULE ORAL 3 TIMES DAILY
Status: DISCONTINUED | OUTPATIENT
Start: 2019-10-10 | End: 2019-10-10

## 2019-10-10 RX ORDER — HYDROCODONE BITARTRATE AND ACETAMINOPHEN 5; 325 MG/1; MG/1
1 TABLET ORAL EVERY 4 HOURS PRN
Status: DISCONTINUED | OUTPATIENT
Start: 2019-10-10 | End: 2019-10-11 | Stop reason: HOSPADM

## 2019-10-10 RX ORDER — FENTANYL CITRATE 50 UG/ML
INJECTION, SOLUTION INTRAMUSCULAR; INTRAVENOUS PRN
Status: DISCONTINUED | OUTPATIENT
Start: 2019-10-10 | End: 2019-10-10 | Stop reason: SDUPTHER

## 2019-10-10 RX ORDER — DIPHENHYDRAMINE HYDROCHLORIDE 50 MG/ML
12.5 INJECTION INTRAMUSCULAR; INTRAVENOUS
Status: DISCONTINUED | OUTPATIENT
Start: 2019-10-10 | End: 2019-10-10 | Stop reason: HOSPADM

## 2019-10-10 RX ORDER — HEPARIN SODIUM 5000 [USP'U]/ML
5000 INJECTION, SOLUTION INTRAVENOUS; SUBCUTANEOUS EVERY 8 HOURS SCHEDULED
Status: DISCONTINUED | OUTPATIENT
Start: 2019-10-11 | End: 2019-10-11 | Stop reason: HOSPADM

## 2019-10-10 RX ORDER — ALLOPURINOL 300 MG/1
300 TABLET ORAL DAILY
Status: DISCONTINUED | OUTPATIENT
Start: 2019-10-11 | End: 2019-10-11 | Stop reason: HOSPADM

## 2019-10-10 RX ORDER — LABETALOL 20 MG/4 ML (5 MG/ML) INTRAVENOUS SYRINGE
5 EVERY 10 MIN PRN
Status: DISCONTINUED | OUTPATIENT
Start: 2019-10-10 | End: 2019-10-10 | Stop reason: HOSPADM

## 2019-10-10 RX ORDER — BUPIVACAINE HYDROCHLORIDE 5 MG/ML
INJECTION, SOLUTION EPIDURAL; INTRACAUDAL
Status: DISPENSED
Start: 2019-10-10 | End: 2019-10-11

## 2019-10-10 RX ORDER — MORPHINE SULFATE 4 MG/ML
2 INJECTION, SOLUTION INTRAMUSCULAR; INTRAVENOUS EVERY 5 MIN PRN
Status: DISCONTINUED | OUTPATIENT
Start: 2019-10-10 | End: 2019-10-10 | Stop reason: HOSPADM

## 2019-10-10 RX ORDER — LEVOTHYROXINE SODIUM 137 UG/1
137 TABLET ORAL DAILY
Status: DISCONTINUED | OUTPATIENT
Start: 2019-10-11 | End: 2019-10-11 | Stop reason: HOSPADM

## 2019-10-10 RX ORDER — OXYCODONE HYDROCHLORIDE AND ACETAMINOPHEN 5; 325 MG/1; MG/1
1 TABLET ORAL PRN
Status: DISCONTINUED | OUTPATIENT
Start: 2019-10-10 | End: 2019-10-10 | Stop reason: HOSPADM

## 2019-10-10 RX ORDER — INSULIN GLARGINE 100 [IU]/ML
0-14 INJECTION, SOLUTION SUBCUTANEOUS NIGHTLY
Status: DISCONTINUED | OUTPATIENT
Start: 2019-10-10 | End: 2019-10-10

## 2019-10-10 RX ORDER — SODIUM CHLORIDE 0.9 % (FLUSH) 0.9 %
10 SYRINGE (ML) INJECTION PRN
Status: DISCONTINUED | OUTPATIENT
Start: 2019-10-10 | End: 2019-10-11 | Stop reason: HOSPADM

## 2019-10-10 RX ORDER — UREA 10 %
3 LOTION (ML) TOPICAL NIGHTLY PRN
Status: DISCONTINUED | OUTPATIENT
Start: 2019-10-10 | End: 2019-10-11 | Stop reason: HOSPADM

## 2019-10-10 RX ORDER — PROPOFOL 10 MG/ML
INJECTION, EMULSION INTRAVENOUS PRN
Status: DISCONTINUED | OUTPATIENT
Start: 2019-10-10 | End: 2019-10-10 | Stop reason: SDUPTHER

## 2019-10-10 RX ORDER — NICOTINE POLACRILEX 4 MG
15 LOZENGE BUCCAL PRN
Status: DISCONTINUED | OUTPATIENT
Start: 2019-10-10 | End: 2019-10-11 | Stop reason: HOSPADM

## 2019-10-10 RX ORDER — CLOBETASOL PROPIONATE 0.5 MG/G
CREAM TOPICAL 2 TIMES DAILY
Status: DISCONTINUED | OUTPATIENT
Start: 2019-10-10 | End: 2019-10-11 | Stop reason: HOSPADM

## 2019-10-10 RX ORDER — DEXTROSE MONOHYDRATE 50 MG/ML
100 INJECTION, SOLUTION INTRAVENOUS PRN
Status: DISCONTINUED | OUTPATIENT
Start: 2019-10-10 | End: 2019-10-11 | Stop reason: HOSPADM

## 2019-10-10 RX ORDER — MIDAZOLAM HYDROCHLORIDE 1 MG/ML
INJECTION INTRAMUSCULAR; INTRAVENOUS
Status: DISPENSED
Start: 2019-10-10 | End: 2019-10-11

## 2019-10-10 RX ORDER — MORPHINE SULFATE 4 MG/ML
1 INJECTION, SOLUTION INTRAMUSCULAR; INTRAVENOUS EVERY 5 MIN PRN
Status: DISCONTINUED | OUTPATIENT
Start: 2019-10-10 | End: 2019-10-10 | Stop reason: HOSPADM

## 2019-10-10 RX ORDER — HYDROCODONE BITARTRATE AND ACETAMINOPHEN 5; 325 MG/1; MG/1
2 TABLET ORAL EVERY 4 HOURS PRN
Status: DISCONTINUED | OUTPATIENT
Start: 2019-10-10 | End: 2019-10-11 | Stop reason: HOSPADM

## 2019-10-10 RX ORDER — HYDROCHLOROTHIAZIDE 25 MG/1
25 TABLET ORAL DAILY
Status: DISCONTINUED | OUTPATIENT
Start: 2019-10-11 | End: 2019-10-11 | Stop reason: HOSPADM

## 2019-10-10 RX ORDER — GABAPENTIN 600 MG/1
600 TABLET ORAL 3 TIMES DAILY
Status: DISCONTINUED | OUTPATIENT
Start: 2019-10-10 | End: 2019-10-11 | Stop reason: HOSPADM

## 2019-10-10 RX ORDER — METRONIDAZOLE 7.5 MG/G
GEL TOPICAL 2 TIMES DAILY
Status: DISCONTINUED | OUTPATIENT
Start: 2019-10-10 | End: 2019-10-10

## 2019-10-10 RX ORDER — ONDANSETRON 2 MG/ML
INJECTION INTRAMUSCULAR; INTRAVENOUS PRN
Status: DISCONTINUED | OUTPATIENT
Start: 2019-10-10 | End: 2019-10-10 | Stop reason: SDUPTHER

## 2019-10-10 RX ORDER — OXYCODONE HYDROCHLORIDE AND ACETAMINOPHEN 5; 325 MG/1; MG/1
2 TABLET ORAL PRN
Status: DISCONTINUED | OUTPATIENT
Start: 2019-10-10 | End: 2019-10-10 | Stop reason: HOSPADM

## 2019-10-10 RX ORDER — SODIUM CHLORIDE 0.9 % (FLUSH) 0.9 %
10 SYRINGE (ML) INJECTION EVERY 12 HOURS SCHEDULED
Status: DISCONTINUED | OUTPATIENT
Start: 2019-10-10 | End: 2019-10-11 | Stop reason: HOSPADM

## 2019-10-10 RX ORDER — DEXAMETHASONE SODIUM PHOSPHATE 4 MG/ML
INJECTION, SOLUTION INTRA-ARTICULAR; INTRALESIONAL; INTRAMUSCULAR; INTRAVENOUS; SOFT TISSUE PRN
Status: DISCONTINUED | OUTPATIENT
Start: 2019-10-10 | End: 2019-10-10 | Stop reason: SDUPTHER

## 2019-10-10 RX ORDER — GABAPENTIN 600 MG/1
600 TABLET ORAL 3 TIMES DAILY
Status: ON HOLD | COMMUNITY
End: 2019-10-11

## 2019-10-10 RX ADMIN — GABAPENTIN 600 MG: 600 TABLET, FILM COATED ORAL at 22:15

## 2019-10-10 RX ADMIN — SODIUM CHLORIDE, SODIUM LACTATE, POTASSIUM CHLORIDE, AND CALCIUM CHLORIDE: 600; 310; 30; 20 INJECTION, SOLUTION INTRAVENOUS at 14:53

## 2019-10-10 RX ADMIN — ONDANSETRON 4 MG: 2 INJECTION INTRAMUSCULAR; INTRAVENOUS at 17:21

## 2019-10-10 RX ADMIN — SODIUM CHLORIDE: 9 INJECTION, SOLUTION INTRAVENOUS at 18:37

## 2019-10-10 RX ADMIN — FENTANYL CITRATE 100 MCG: 50 INJECTION INTRAMUSCULAR; INTRAVENOUS at 14:56

## 2019-10-10 RX ADMIN — FENTANYL CITRATE 50 MCG: 50 INJECTION INTRAMUSCULAR; INTRAVENOUS at 16:55

## 2019-10-10 RX ADMIN — SODIUM CHLORIDE: 9 INJECTION, SOLUTION INTRAVENOUS at 19:59

## 2019-10-10 RX ADMIN — PHENYLEPHRINE HYDROCHLORIDE 100 MCG: 10 INJECTION, SOLUTION INTRAMUSCULAR; INTRAVENOUS; SUBCUTANEOUS at 16:00

## 2019-10-10 RX ADMIN — HYDROCODONE BITARTRATE AND ACETAMINOPHEN 1 TABLET: 5; 325 TABLET ORAL at 22:22

## 2019-10-10 RX ADMIN — PROPOFOL 25 MG: 10 INJECTION, EMULSION INTRAVENOUS at 18:41

## 2019-10-10 RX ADMIN — PROPOFOL 25 MG: 10 INJECTION, EMULSION INTRAVENOUS at 18:37

## 2019-10-10 RX ADMIN — LISINOPRIL 20 MG: 20 TABLET ORAL at 22:15

## 2019-10-10 RX ADMIN — PROPOFOL 25 MG: 10 INJECTION, EMULSION INTRAVENOUS at 17:21

## 2019-10-10 RX ADMIN — DEXAMETHASONE SODIUM PHOSPHATE 8 MG: 4 INJECTION, SOLUTION INTRAMUSCULAR; INTRAVENOUS at 15:04

## 2019-10-10 RX ADMIN — SODIUM CHLORIDE, POTASSIUM CHLORIDE, SODIUM LACTATE AND CALCIUM CHLORIDE: 600; 310; 30; 20 INJECTION, SOLUTION INTRAVENOUS at 12:28

## 2019-10-10 RX ADMIN — MIDAZOLAM HYDROCHLORIDE 100 MG: 2 INJECTION, SOLUTION INTRAMUSCULAR; INTRAVENOUS at 14:58

## 2019-10-10 RX ADMIN — CEFAZOLIN SODIUM 3 G: 10 INJECTION, POWDER, FOR SOLUTION INTRAVENOUS at 14:53

## 2019-10-10 RX ADMIN — PHENYLEPHRINE HYDROCHLORIDE 100 MCG: 10 INJECTION, SOLUTION INTRAMUSCULAR; INTRAVENOUS; SUBCUTANEOUS at 15:14

## 2019-10-10 RX ADMIN — ONDANSETRON 4 MG: 2 INJECTION INTRAMUSCULAR; INTRAVENOUS at 14:58

## 2019-10-10 RX ADMIN — FENTANYL CITRATE 50 MCG: 50 INJECTION INTRAMUSCULAR; INTRAVENOUS at 17:10

## 2019-10-10 RX ADMIN — SODIUM CHLORIDE, SODIUM LACTATE, POTASSIUM CHLORIDE, AND CALCIUM CHLORIDE: 600; 310; 30; 20 INJECTION, SOLUTION INTRAVENOUS at 16:43

## 2019-10-10 RX ADMIN — Medication 10 ML: at 22:00

## 2019-10-10 RX ADMIN — PHENYLEPHRINE HYDROCHLORIDE 100 MCG: 10 INJECTION, SOLUTION INTRAMUSCULAR; INTRAVENOUS; SUBCUTANEOUS at 15:50

## 2019-10-10 ASSESSMENT — PULMONARY FUNCTION TESTS
PIF_VALUE: 19
PIF_VALUE: 19
PIF_VALUE: 18
PIF_VALUE: 19
PIF_VALUE: 17
PIF_VALUE: 22
PIF_VALUE: 19
PIF_VALUE: 20
PIF_VALUE: 19
PIF_VALUE: 18
PIF_VALUE: 21
PIF_VALUE: 19
PIF_VALUE: 18
PIF_VALUE: 19
PIF_VALUE: 22
PIF_VALUE: 18
PIF_VALUE: 19
PIF_VALUE: 22
PIF_VALUE: 19
PIF_VALUE: 18
PIF_VALUE: 23
PIF_VALUE: 19
PIF_VALUE: 18
PIF_VALUE: 18
PIF_VALUE: 5
PIF_VALUE: 5
PIF_VALUE: 19
PIF_VALUE: 23
PIF_VALUE: 19
PIF_VALUE: 18
PIF_VALUE: 18
PIF_VALUE: 19
PIF_VALUE: 2
PIF_VALUE: 17
PIF_VALUE: 19
PIF_VALUE: 18
PIF_VALUE: 19
PIF_VALUE: 18
PIF_VALUE: 17
PIF_VALUE: 18
PIF_VALUE: 19
PIF_VALUE: 18
PIF_VALUE: 19
PIF_VALUE: 18
PIF_VALUE: 19
PIF_VALUE: 20
PIF_VALUE: 19
PIF_VALUE: 18
PIF_VALUE: 19
PIF_VALUE: 20
PIF_VALUE: 19
PIF_VALUE: 18
PIF_VALUE: 19
PIF_VALUE: 20
PIF_VALUE: 17
PIF_VALUE: 18
PIF_VALUE: 19
PIF_VALUE: 0
PIF_VALUE: 18
PIF_VALUE: 18
PIF_VALUE: 19
PIF_VALUE: 22
PIF_VALUE: 19
PIF_VALUE: 2
PIF_VALUE: 19
PIF_VALUE: 21
PIF_VALUE: 23
PIF_VALUE: 21
PIF_VALUE: 19
PIF_VALUE: 19
PIF_VALUE: 18
PIF_VALUE: 19
PIF_VALUE: 19
PIF_VALUE: 1
PIF_VALUE: 19
PIF_VALUE: 19
PIF_VALUE: 0
PIF_VALUE: 18
PIF_VALUE: 19
PIF_VALUE: 17
PIF_VALUE: 20
PIF_VALUE: 19
PIF_VALUE: 18
PIF_VALUE: 19
PIF_VALUE: 19
PIF_VALUE: 18
PIF_VALUE: 22
PIF_VALUE: 21
PIF_VALUE: 19
PIF_VALUE: 18
PIF_VALUE: 22
PIF_VALUE: 19
PIF_VALUE: 18
PIF_VALUE: 19
PIF_VALUE: 4
PIF_VALUE: 19
PIF_VALUE: 22
PIF_VALUE: 18
PIF_VALUE: 19
PIF_VALUE: 19
PIF_VALUE: 18
PIF_VALUE: 19
PIF_VALUE: 19
PIF_VALUE: 1
PIF_VALUE: 19
PIF_VALUE: 6
PIF_VALUE: 19
PIF_VALUE: 19
PIF_VALUE: 17
PIF_VALUE: 18
PIF_VALUE: 19
PIF_VALUE: 19
PIF_VALUE: 22
PIF_VALUE: 20
PIF_VALUE: 19
PIF_VALUE: 18
PIF_VALUE: 20
PIF_VALUE: 22
PIF_VALUE: 19
PIF_VALUE: 19
PIF_VALUE: 1
PIF_VALUE: 19
PIF_VALUE: 17
PIF_VALUE: 18
PIF_VALUE: 20
PIF_VALUE: 1
PIF_VALUE: 19
PIF_VALUE: 22
PIF_VALUE: 19
PIF_VALUE: 1
PIF_VALUE: 19
PIF_VALUE: 18
PIF_VALUE: 19
PIF_VALUE: 17
PIF_VALUE: 18
PIF_VALUE: 19
PIF_VALUE: 18
PIF_VALUE: 1
PIF_VALUE: 19
PIF_VALUE: 17
PIF_VALUE: 19
PIF_VALUE: 22
PIF_VALUE: 19
PIF_VALUE: 22
PIF_VALUE: 18
PIF_VALUE: 17
PIF_VALUE: 18
PIF_VALUE: 23
PIF_VALUE: 18
PIF_VALUE: 22
PIF_VALUE: 19
PIF_VALUE: 18
PIF_VALUE: 19
PIF_VALUE: 6
PIF_VALUE: 17
PIF_VALUE: 19
PIF_VALUE: 19
PIF_VALUE: 21
PIF_VALUE: 6
PIF_VALUE: 18
PIF_VALUE: 19
PIF_VALUE: 19
PIF_VALUE: 17
PIF_VALUE: 18
PIF_VALUE: 19
PIF_VALUE: 19
PIF_VALUE: 21
PIF_VALUE: 18
PIF_VALUE: 18
PIF_VALUE: 33
PIF_VALUE: 22
PIF_VALUE: 18
PIF_VALUE: 18
PIF_VALUE: 1

## 2019-10-10 ASSESSMENT — PAIN - FUNCTIONAL ASSESSMENT
PAIN_FUNCTIONAL_ASSESSMENT: 0-10
PAIN_FUNCTIONAL_ASSESSMENT: PREVENTS OR INTERFERES SOME ACTIVE ACTIVITIES AND ADLS

## 2019-10-10 ASSESSMENT — PAIN DESCRIPTION - FREQUENCY: FREQUENCY: CONTINUOUS

## 2019-10-10 ASSESSMENT — PAIN DESCRIPTION - PAIN TYPE: TYPE: SURGICAL PAIN

## 2019-10-10 ASSESSMENT — PAIN SCALES - GENERAL
PAINLEVEL_OUTOF10: 0
PAINLEVEL_OUTOF10: 5
PAINLEVEL_OUTOF10: 0
PAINLEVEL_OUTOF10: 3

## 2019-10-10 ASSESSMENT — PAIN DESCRIPTION - LOCATION: LOCATION: FOOT

## 2019-10-10 ASSESSMENT — PAIN DESCRIPTION - ORIENTATION: ORIENTATION: LEFT

## 2019-10-10 ASSESSMENT — PAIN DESCRIPTION - DESCRIPTORS: DESCRIPTORS: ACHING

## 2019-10-10 ASSESSMENT — PAIN DESCRIPTION - ONSET: ONSET: ON-GOING

## 2019-10-10 ASSESSMENT — PAIN DESCRIPTION - PROGRESSION: CLINICAL_PROGRESSION: GRADUALLY WORSENING

## 2019-10-11 VITALS
SYSTOLIC BLOOD PRESSURE: 123 MMHG | WEIGHT: 263 LBS | TEMPERATURE: 98.5 F | HEART RATE: 96 BPM | DIASTOLIC BLOOD PRESSURE: 73 MMHG | RESPIRATION RATE: 16 BRPM | HEIGHT: 66 IN | OXYGEN SATURATION: 94 % | BODY MASS INDEX: 42.27 KG/M2

## 2019-10-11 LAB
ANION GAP SERPL CALCULATED.3IONS-SCNC: 10 MMOL/L (ref 3–16)
BUN BLDV-MCNC: 25 MG/DL (ref 7–20)
CALCIUM SERPL-MCNC: 9.3 MG/DL (ref 8.3–10.6)
CHLORIDE BLD-SCNC: 102 MMOL/L (ref 99–110)
CO2: 26 MMOL/L (ref 21–32)
CREAT SERPL-MCNC: 1.2 MG/DL (ref 0.6–1.2)
ESTIMATED AVERAGE GLUCOSE: 168.6 MG/DL
GFR AFRICAN AMERICAN: 55
GFR NON-AFRICAN AMERICAN: 45
GLUCOSE BLD-MCNC: 226 MG/DL (ref 70–99)
GLUCOSE BLD-MCNC: 230 MG/DL (ref 70–99)
HBA1C MFR BLD: 7.5 %
MRSA SCREEN RT-PCR: NORMAL
PERFORMED ON: ABNORMAL
POTASSIUM SERPL-SCNC: 4.4 MMOL/L (ref 3.5–5.1)
SODIUM BLD-SCNC: 138 MMOL/L (ref 136–145)

## 2019-10-11 PROCEDURE — 97116 GAIT TRAINING THERAPY: CPT

## 2019-10-11 PROCEDURE — 97530 THERAPEUTIC ACTIVITIES: CPT

## 2019-10-11 PROCEDURE — 6370000000 HC RX 637 (ALT 250 FOR IP): Performed by: FAMILY MEDICINE

## 2019-10-11 PROCEDURE — 97166 OT EVAL MOD COMPLEX 45 MIN: CPT

## 2019-10-11 PROCEDURE — 6370000000 HC RX 637 (ALT 250 FOR IP): Performed by: ORTHOPAEDIC SURGERY

## 2019-10-11 PROCEDURE — 97535 SELF CARE MNGMENT TRAINING: CPT

## 2019-10-11 PROCEDURE — 6370000000 HC RX 637 (ALT 250 FOR IP): Performed by: INTERNAL MEDICINE

## 2019-10-11 PROCEDURE — 96372 THER/PROPH/DIAG INJ SC/IM: CPT

## 2019-10-11 PROCEDURE — G0378 HOSPITAL OBSERVATION PER HR: HCPCS

## 2019-10-11 PROCEDURE — 97162 PT EVAL MOD COMPLEX 30 MIN: CPT

## 2019-10-11 PROCEDURE — 2580000003 HC RX 258: Performed by: ORTHOPAEDIC SURGERY

## 2019-10-11 PROCEDURE — 6360000002 HC RX W HCPCS: Performed by: ORTHOPAEDIC SURGERY

## 2019-10-11 PROCEDURE — 96374 THER/PROPH/DIAG INJ IV PUSH: CPT

## 2019-10-11 RX ORDER — ASPIRIN 325 MG
325 TABLET, DELAYED RELEASE (ENTERIC COATED) ORAL 2 TIMES DAILY WITH MEALS
Qty: 30 TABLET | Refills: 3 | COMMUNITY
Start: 2019-10-11

## 2019-10-11 RX ORDER — HYDROCODONE BITARTRATE AND ACETAMINOPHEN 5; 325 MG/1; MG/1
1-2 TABLET ORAL
Qty: 40 TABLET | Refills: 0 | COMMUNITY
Start: 2019-10-11 | End: 2019-10-18

## 2019-10-11 RX ORDER — ERGOCALCIFEROL (VITAMIN D2) 1250 MCG
50000 CAPSULE ORAL WEEKLY
COMMUNITY

## 2019-10-11 RX ORDER — LISINOPRIL AND HYDROCHLOROTHIAZIDE 25; 20 MG/1; MG/1
1 TABLET ORAL DAILY
COMMUNITY

## 2019-10-11 RX ORDER — DICLOFENAC SODIUM 75 MG/1
75 TABLET, DELAYED RELEASE ORAL 2 TIMES DAILY
COMMUNITY

## 2019-10-11 RX ORDER — GABAPENTIN 600 MG/1
1200 TABLET ORAL 3 TIMES DAILY
COMMUNITY

## 2019-10-11 RX ADMIN — LEVOTHYROXINE SODIUM 137 MCG: 137 TABLET ORAL at 08:54

## 2019-10-11 RX ADMIN — METOPROLOL TARTRATE 50 MG: 50 TABLET ORAL at 08:44

## 2019-10-11 RX ADMIN — LISINOPRIL 20 MG: 20 TABLET ORAL at 08:44

## 2019-10-11 RX ADMIN — INSULIN HUMAN 15 UNITS: 100 INJECTION, SOLUTION PARENTERAL at 09:55

## 2019-10-11 RX ADMIN — HYDROCODONE BITARTRATE AND ACETAMINOPHEN 2 TABLET: 5; 325 TABLET ORAL at 11:52

## 2019-10-11 RX ADMIN — ALLOPURINOL 300 MG: 300 TABLET ORAL at 08:44

## 2019-10-11 RX ADMIN — HEPARIN SODIUM 5000 UNITS: 5000 INJECTION, SOLUTION INTRAVENOUS; SUBCUTANEOUS at 06:26

## 2019-10-11 RX ADMIN — HYDROCODONE BITARTRATE AND ACETAMINOPHEN 2 TABLET: 5; 325 TABLET ORAL at 04:59

## 2019-10-11 RX ADMIN — GABAPENTIN 600 MG: 600 TABLET, FILM COATED ORAL at 08:54

## 2019-10-11 RX ADMIN — Medication 3 MG: at 02:07

## 2019-10-11 RX ADMIN — HYDROCHLOROTHIAZIDE 25 MG: 25 TABLET ORAL at 08:44

## 2019-10-11 RX ADMIN — HYDROMORPHONE HYDROCHLORIDE 0.5 MG: 1 INJECTION, SOLUTION INTRAMUSCULAR; INTRAVENOUS; SUBCUTANEOUS at 02:06

## 2019-10-11 RX ADMIN — Medication 10 ML: at 08:46

## 2019-10-11 ASSESSMENT — PAIN DESCRIPTION - FREQUENCY
FREQUENCY: CONTINUOUS
FREQUENCY: CONTINUOUS

## 2019-10-11 ASSESSMENT — PAIN DESCRIPTION - ONSET
ONSET: ON-GOING
ONSET: ON-GOING

## 2019-10-11 ASSESSMENT — PAIN SCALES - GENERAL
PAINLEVEL_OUTOF10: 7
PAINLEVEL_OUTOF10: 7
PAINLEVEL_OUTOF10: 4
PAINLEVEL_OUTOF10: 5
PAINLEVEL_OUTOF10: 7

## 2019-10-11 ASSESSMENT — PAIN DESCRIPTION - ORIENTATION
ORIENTATION: LEFT
ORIENTATION: LEFT

## 2019-10-11 ASSESSMENT — PAIN DESCRIPTION - PAIN TYPE
TYPE: SURGICAL PAIN
TYPE: SURGICAL PAIN

## 2019-10-11 ASSESSMENT — PAIN DESCRIPTION - PROGRESSION
CLINICAL_PROGRESSION: GRADUALLY WORSENING
CLINICAL_PROGRESSION: GRADUALLY WORSENING

## 2019-10-11 ASSESSMENT — PAIN DESCRIPTION - LOCATION
LOCATION: FOOT
LOCATION: FOOT

## 2019-10-11 ASSESSMENT — PAIN DESCRIPTION - DESCRIPTORS
DESCRIPTORS: ACHING;BURNING
DESCRIPTORS: ACHING

## 2019-10-11 ASSESSMENT — PAIN - FUNCTIONAL ASSESSMENT
PAIN_FUNCTIONAL_ASSESSMENT: PREVENTS OR INTERFERES SOME ACTIVE ACTIVITIES AND ADLS
PAIN_FUNCTIONAL_ASSESSMENT: PREVENTS OR INTERFERES SOME ACTIVE ACTIVITIES AND ADLS

## 2019-11-19 ENCOUNTER — OFFICE VISIT (OUTPATIENT)
Dept: DERMATOLOGY | Age: 64
End: 2019-11-19
Payer: MEDICARE

## 2019-11-19 DIAGNOSIS — T50.905A MEDICATION REACTION, INITIAL ENCOUNTER: ICD-10-CM

## 2019-11-19 DIAGNOSIS — L57.0 KERATOSIS, ACTINIC: Primary | ICD-10-CM

## 2019-11-19 PROCEDURE — 1036F TOBACCO NON-USER: CPT | Performed by: DERMATOLOGY

## 2019-11-19 PROCEDURE — G8484 FLU IMMUNIZE NO ADMIN: HCPCS | Performed by: DERMATOLOGY

## 2019-11-19 PROCEDURE — G8400 PT W/DXA NO RESULTS DOC: HCPCS | Performed by: DERMATOLOGY

## 2019-11-19 PROCEDURE — G8427 DOCREV CUR MEDS BY ELIG CLIN: HCPCS | Performed by: DERMATOLOGY

## 2019-11-19 PROCEDURE — 1123F ACP DISCUSS/DSCN MKR DOCD: CPT | Performed by: DERMATOLOGY

## 2019-11-19 PROCEDURE — 4040F PNEUMOC VAC/ADMIN/RCVD: CPT | Performed by: DERMATOLOGY

## 2019-11-19 PROCEDURE — 1090F PRES/ABSN URINE INCON ASSESS: CPT | Performed by: DERMATOLOGY

## 2019-11-19 PROCEDURE — G8417 CALC BMI ABV UP PARAM F/U: HCPCS | Performed by: DERMATOLOGY

## 2019-11-19 PROCEDURE — 99213 OFFICE O/P EST LOW 20 MIN: CPT | Performed by: DERMATOLOGY

## 2019-11-19 PROCEDURE — 3017F COLORECTAL CA SCREEN DOC REV: CPT | Performed by: DERMATOLOGY

## 2019-12-07 NOTE — PLAN OF CARE
Problem: Patient Care Overview  Goal: Plan of Care Review   04/16/18 1036   Coping/Psychosocial   Plan of Care Reviewed With patient   OTHER   Outcome Summary Improved tolerance to functional activity this day with an increase in gait distance and decreased assist required for overall functional mobility.          none

## 2019-12-29 NOTE — PROGRESS NOTES
Continued Stay Note  [facility]     Patient Name: Elisabet Berry  MRN: [id number]  Today's Date: 4/12/2018    Admit Date: 4/7/2018          Discharge Plan     Row Name 04/12/18 1132       Plan    Plan Comments Patient confirmed with spouse that she is not on his insurance.  Waiting to see what d/c meds patient will be placed on.  Have pricing already for insulin at [facility] and [facility] retail [facility].                     Jimmy Sanchez RN     left ear pain "I think I have an ear infection."

## 2020-02-18 ENCOUNTER — OFFICE VISIT (OUTPATIENT)
Dept: DERMATOLOGY | Age: 65
End: 2020-02-18
Payer: MEDICARE

## 2020-02-18 PROCEDURE — 1090F PRES/ABSN URINE INCON ASSESS: CPT | Performed by: DERMATOLOGY

## 2020-02-18 PROCEDURE — 1123F ACP DISCUSS/DSCN MKR DOCD: CPT | Performed by: DERMATOLOGY

## 2020-02-18 PROCEDURE — G8417 CALC BMI ABV UP PARAM F/U: HCPCS | Performed by: DERMATOLOGY

## 2020-02-18 PROCEDURE — 4040F PNEUMOC VAC/ADMIN/RCVD: CPT | Performed by: DERMATOLOGY

## 2020-02-18 PROCEDURE — 99213 OFFICE O/P EST LOW 20 MIN: CPT | Performed by: DERMATOLOGY

## 2020-02-18 PROCEDURE — 3017F COLORECTAL CA SCREEN DOC REV: CPT | Performed by: DERMATOLOGY

## 2020-02-18 PROCEDURE — 1036F TOBACCO NON-USER: CPT | Performed by: DERMATOLOGY

## 2020-02-18 PROCEDURE — G8427 DOCREV CUR MEDS BY ELIG CLIN: HCPCS | Performed by: DERMATOLOGY

## 2020-02-18 PROCEDURE — G8484 FLU IMMUNIZE NO ADMIN: HCPCS | Performed by: DERMATOLOGY

## 2020-02-18 PROCEDURE — G8400 PT W/DXA NO RESULTS DOC: HCPCS | Performed by: DERMATOLOGY

## 2020-02-18 RX ORDER — TRIAMCINOLONE ACETONIDE 1 MG/G
CREAM TOPICAL
Qty: 45 G | Refills: 2 | Status: SHIPPED | OUTPATIENT
Start: 2020-02-18 | End: 2020-02-18

## 2020-02-18 NOTE — PROGRESS NOTES
organization: Not on file     Attends meetings of clubs or organizations: Not on file     Relationship status: Not on file    Intimate partner violence:     Fear of current or ex partner: Not on file     Emotionally abused: Not on file     Physically abused: Not on file     Forced sexual activity: Not on file   Other Topics Concern    Not on file   Social History Narrative    Not on file       Physical Examination       -General: Well-appearing, NAD  1. Well-developed well-nourished. Background erythema with telangiectasias of face. Postinflammatory erythema at site of prior Efudex-mild  Excellent improvement to actinic keratoses    Assessment and Plan     1. Keratosis, actinic-excellent improvement after healed from Efudex. Postinflammatory erythema mild-discussed slow resolution with time   2. Rosacea-no treatment for now. No inflammatory papules.

## 2020-08-18 ENCOUNTER — OFFICE VISIT (OUTPATIENT)
Dept: DERMATOLOGY | Age: 65
End: 2020-08-18
Payer: MEDICARE

## 2020-08-18 VITALS — TEMPERATURE: 97 F

## 2020-08-18 PROCEDURE — G8417 CALC BMI ABV UP PARAM F/U: HCPCS | Performed by: DERMATOLOGY

## 2020-08-18 PROCEDURE — 1090F PRES/ABSN URINE INCON ASSESS: CPT | Performed by: DERMATOLOGY

## 2020-08-18 PROCEDURE — 3017F COLORECTAL CA SCREEN DOC REV: CPT | Performed by: DERMATOLOGY

## 2020-08-18 PROCEDURE — G8400 PT W/DXA NO RESULTS DOC: HCPCS | Performed by: DERMATOLOGY

## 2020-08-18 PROCEDURE — 11102 TANGNTL BX SKIN SINGLE LES: CPT | Performed by: DERMATOLOGY

## 2020-08-18 PROCEDURE — 1036F TOBACCO NON-USER: CPT | Performed by: DERMATOLOGY

## 2020-08-18 PROCEDURE — G8428 CUR MEDS NOT DOCUMENT: HCPCS | Performed by: DERMATOLOGY

## 2020-08-18 PROCEDURE — 1123F ACP DISCUSS/DSCN MKR DOCD: CPT | Performed by: DERMATOLOGY

## 2020-08-18 PROCEDURE — 4040F PNEUMOC VAC/ADMIN/RCVD: CPT | Performed by: DERMATOLOGY

## 2020-08-18 PROCEDURE — 99213 OFFICE O/P EST LOW 20 MIN: CPT | Performed by: DERMATOLOGY

## 2020-08-18 NOTE — PROGRESS NOTES
[address]ermatology  Valdemar Ferreira M.D.  [phone]       Louise Clarke  1955    71 y.o. female     Date of Visit: 8/18/2020    Chief Complaint:   Chief Complaint   Patient presents with    Skin Exam        I was asked to see this patient by Dr. Andrews ref. provider found. History of Present Illness:  1. Total-body skin exam    Increasing number of seborrheic keratoses over her right abdomen, 5-increasing in size and number. Usually asymptomatic although sometimes bothersome as they are raised    Raised papule left upper arm-patient has recently excoriated. Recently , moving to [place] to be closer to daughter who lives in [place]      Skin history:  Actinic keratoses    11/19 Efudex forehead, temples, cheeks, upper lip    No history of skin cancer  Labial herpes simplex- Valtrex  Eczema-clobetasol ointment- has not used since 2014    Review of Systems:  Constitutional: Reports general sense of well-being       Past Medical History, Surgical History, Family History, Medications and Allergies reviewed.     Social History:   Social History     Socioeconomic History    Marital status:      Spouse name: Not on file    Number of children: Not on file    Years of education: Not on file    Highest education level: Not on file   Occupational History    Not on file   Social Needs    Financial resource strain: Not on file    Food insecurity     Worry: Not on file     Inability: Not on file    Transportation needs     Medical: Not on file     Non-medical: Not on file   Tobacco Use    Smoking status: Never Smoker    Smokeless tobacco: Never Used   Substance and Sexual Activity    Alcohol use: No    Drug use: Not on file    Sexual activity: Not on file   Lifestyle    Physical activity     Days per week: Not on file     Minutes per session: Not on file    Stress: Not on file   Relationships    Social connections     Talks on phone: Not on file     Gets together: Not on file Attends Yazidism service: Not on file     Active member of club or organization: Not on file     Attends meetings of clubs or organizations: Not on file     Relationship status: Not on file    Intimate partner violence     Fear of current or ex partner: Not on file     Emotionally abused: Not on file     Physically abused: Not on file     Forced sexual activity: Not on file   Other Topics Concern    Not on file   Social History Narrative    Not on file       Physical Examination       -General: Well-appearing, NAD  1. Normal affect. Total body skin exam including scalp, face, neck, chest, abdomen, back, bilateral upper extremities, bilateral lower extremities, ocular conjunctiva, external lips, and nails was performed. Examination normal unless stated below. Underwear area not examined. Scattered on the trunk and extremities are multiple well-defined round and oval symmetric smoothly-bordered uniformly brown macules and papules. Multiple hyperkeratotic stuck on papules thighs, right abdomen    Left lateral upper arm 1.4 cm tan, hypopigmented papule with peripheral excoriation-rule out atypia, pigmented Bowen's disease, inflamed seborrheic keratosis                Assessment and Plan     1. Neoplasm of uncertain behavior of skin-left lateral upper arm--Discussed possible diagnosis. Patient agreeable to biopsy. Verbal consent obtained after risks (infection, bleeding, scar), benefits and alternatives explained. -Area(s) to be biopsied were marked with a surgical pen. Site(s) were cleansed with alcohol. Local anesthesia achieved with 1% lidocaine with epinephrine/sodium bicarbonate. Shave biopsy performed with a razor blade. Hemostasis was achieved with aluminum chloride. The wound(s) were dressed with petrolatum and covered with a bandage. Specimen(s) sent to pathology. Pt educated re: risk of bleeding, infection, scar and wound care instructions. 2. Seborrheic keratoses-monitor for change   3.  Benign nevus - Benign acquired melanocytic nevi  -Recommend monthly self skin exams   -Educated regarding the ABCDEs of melanoma detection   -Call for any new/changing moles or concerning lesions  -Reviewed sun protective behavior -- sun avoidance during the peak hours of the day, sun-protective clothing (including hat and sunglasses), sunscreen use (water resistant, broad spectrum, SPF at least 30, need for reapplication every 2 to 3 hours), avoidance of tanning beds        Total-body skin exam 1 year either with me or in [place]

## 2020-08-21 LAB — DERMATOLOGY PATHOLOGY REPORT: NORMAL

## 2020-08-24 ENCOUNTER — TELEPHONE (OUTPATIENT)
Dept: DERMATOLOGY | Age: 65
End: 2020-08-24

## 2020-08-24 NOTE — TELEPHONE ENCOUNTER
Please notify patient biopsy result was benign.      Date of biopsy: 8/18/2020  Site of biopsy: L lat upper arm  Result: lichenoid keratosis    Plan: No further treatment

## (undated) DEVICE — Device

## (undated) DEVICE — 3M™ IOBAN™ 2 ANTIMICROBIAL INCISE DRAPE 6640EZ: Brand: IOBAN™ 2

## (undated) DEVICE — GARMENT,MEDLINE,DVT,INT,CALF,MED, GEN2: Brand: MEDLINE

## (undated) DEVICE — BANDAGE,ELASTIC,ESMARK,STERILE,4"X9',LF: Brand: MEDLINE

## (undated) DEVICE — PODIATRY PK

## (undated) DEVICE — GLOVE SURG SZ 65 THK91MIL LTX FREE SYN POLYISOPRENE

## (undated) DEVICE — GUIDEWIRE ARTHSCP SM 2MM DISP FOR 4.5/8.5MM BEAMING SYS

## (undated) DEVICE — TOWEL,OR,DSP,ST,BLUE,DLX,8/PK,10PK/CS: Brand: MEDLINE

## (undated) DEVICE — LAP SPONGE  18 X 18 IN. PREWASHED SOFTPACK X-RAY DETECTABLE: Brand: CARDINAL HEALTH

## (undated) DEVICE — PADDING CAST W4INXL4YD HIGHLY ABSRB THAN COT EZ APPL

## (undated) DEVICE — INTENDED FOR TISSUE SEPARATION, AND OTHER PROCEDURES THAT REQUIRE A SHARP SURGICAL BLADE TO PUNCTURE OR CUT.: Brand: BARD-PARKER ® CARBON RIB-BACK BLADES

## (undated) DEVICE — 3M™ COBAN™ NL STERILE NON-LATEX SELF-ADHERENT WRAP, 2084S, 4 IN X 5 YD (10 CM X 4,5 M), 18 ROLLS/CASE: Brand: 3M™ COBAN™

## (undated) DEVICE — PRECISION (9.0 X 0.51 X 25.0MM)

## (undated) DEVICE — 3M™ STERI-DRAPE™ U-DRAPE 1015: Brand: STERI-DRAPE™

## (undated) DEVICE — STERILE POLYISOPRENE POWDER-FREE SURGICAL GLOVES WITH EMOLLIENT COATING: Brand: PROTEXIS

## (undated) DEVICE — SUTURE FIBERLOOP SZ 2-0 L20IN NONABSORBABLE BLU BRAID AR7234C

## (undated) DEVICE — SUTURE PROL SZ 0 L30IN NONABSORBABLE BLU L36MM CT-1 1/2 CIR 8424H

## (undated) DEVICE — COVER LT HNDL BLU PLAS

## (undated) DEVICE — SYRINGE, LUER LOCK, 10ML: Brand: MEDLINE

## (undated) DEVICE — GLOVE SURG SZ 85 L1185IN FNGR THK75MIL STRW LTX POLYMER

## (undated) DEVICE — THIN OFFSET (13.3 X 0.38 X 42.0MM)

## (undated) DEVICE — SPLINT CAST 4INX15FT ORTHO GLS

## (undated) DEVICE — TURNOVER KIT RM INF CTRL TECH

## (undated) DEVICE — BIT DRL DIA3MM CANN DISP FOR IO FIX IOFIX 2.0

## (undated) DEVICE — ZIMMER® STERILE DISPOSABLE TOURNIQUET CUFF WITH PLC, SINGLE PORT, SINGLE BLADDER, 34 IN. (86 CM)

## (undated) DEVICE — GAUZE,SPONGE,4"X4",16PLY,XRAY,STRL,LF: Brand: MEDLINE

## (undated) DEVICE — ROLL COT W11.5INXL11FT 1LB HIGHLY ABSRB SURG GRD

## (undated) DEVICE — PROTECTOR ULN NRV PUR FOAM HK LOOP STRP ANATOMICALLY

## (undated) DEVICE — GUIDEWIRE ARTHSCP L 3.2MM DISP FOR 4.5/8.5MM BEAMING SYS

## (undated) DEVICE — E-Z CLEAN, NON-STICK, PTFE COATED, ELECTROSURGICAL BLADE ELECTRODE, 2.5 INCH (6.35 CM): Brand: EZ CLEAN

## (undated) DEVICE — PLATE ES AD W 9FT CRD 2

## (undated) DEVICE — GUIDEWIRE ORTHOPEDIC 2 MM DISP

## (undated) DEVICE — SUTURE FIBERLOOP SZ 2-0 L24IN NONABSORBABLE BLU L26.2MM 3/8 AR723202

## (undated) DEVICE — CHLORAPREP 26ML ORANGE

## (undated) DEVICE — DRAPE C ARM W54XL84IN MINI FOR OEC 6800

## (undated) DEVICE — ROYAL SILK SURGICAL GOWN, XXXL, LONG: Brand: CONVERTORS

## (undated) DEVICE — SUTURE VCRL SZ 3-0 L27IN ABSRB UD L26MM SH 1/2 CIR J416H

## (undated) DEVICE — PIN DRL L LNG DISP FOR 4.5/8.5MM BEAMING SYS

## (undated) DEVICE — DRAPE EQUIP FOOTSWITCH 24X20 IN PUL CORDAND CRD LCK NS

## (undated) DEVICE — SURGICAL SET UP - SURE SET: Brand: MEDLINE INDUSTRIES, INC.

## (undated) DEVICE — BANDAGE,ELASTIC,ESMARK,STERILE,6"X9',LF: Brand: MEDLINE

## (undated) DEVICE — GUIDEWIRE ORTH DIA1.6MM FOR MTP FUS IO FIX IOFIX +

## (undated) DEVICE — SOLUTION IV 1000ML 0.9% SOD CHL

## (undated) DEVICE — SUTURE ETHLN SZ 3-0 L18IN NONABSORBABLE BLK FS-1 L24MM 3/8 663H

## (undated) DEVICE — DRESSING,GAUZE,XEROFORM,CURAD,1"X8",ST: Brand: CURAD

## (undated) DEVICE — MARKER,SKIN,WI/RULER AND LABELS: Brand: MEDLINE